# Patient Record
Sex: FEMALE | Race: WHITE | NOT HISPANIC OR LATINO
[De-identification: names, ages, dates, MRNs, and addresses within clinical notes are randomized per-mention and may not be internally consistent; named-entity substitution may affect disease eponyms.]

---

## 2017-06-30 PROBLEM — Z00.00 ENCOUNTER FOR PREVENTIVE HEALTH EXAMINATION: Status: ACTIVE | Noted: 2017-06-30

## 2017-07-25 ENCOUNTER — APPOINTMENT (OUTPATIENT)
Dept: VASCULAR SURGERY | Facility: CLINIC | Age: 72
End: 2017-07-25

## 2017-08-25 ENCOUNTER — APPOINTMENT (OUTPATIENT)
Dept: VASCULAR SURGERY | Facility: CLINIC | Age: 72
End: 2017-08-25

## 2017-10-25 ENCOUNTER — APPOINTMENT (OUTPATIENT)
Dept: VASCULAR SURGERY | Facility: CLINIC | Age: 72
End: 2017-10-25
Payer: MEDICARE

## 2017-10-25 VITALS
TEMPERATURE: 98.2 F | HEART RATE: 82 BPM | OXYGEN SATURATION: 97 % | DIASTOLIC BLOOD PRESSURE: 71 MMHG | BODY MASS INDEX: 31.58 KG/M2 | RESPIRATION RATE: 16 BRPM | SYSTOLIC BLOOD PRESSURE: 131 MMHG | WEIGHT: 185 LBS | HEIGHT: 64 IN

## 2017-10-25 DIAGNOSIS — I80.01 PHLEBITIS AND THROMBOPHLEBITIS OF SUPERFICIAL VESSELS OF RIGHT LOWER EXTREMITY: ICD-10-CM

## 2017-10-25 DIAGNOSIS — Z78.9 OTHER SPECIFIED HEALTH STATUS: ICD-10-CM

## 2017-10-25 DIAGNOSIS — Z86.69 PERSONAL HISTORY OF OTHER DISEASES OF THE NERVOUS SYSTEM AND SENSE ORGANS: ICD-10-CM

## 2017-10-25 PROCEDURE — 99242 OFF/OP CONSLTJ NEW/EST SF 20: CPT | Mod: 25

## 2017-10-25 PROCEDURE — 99202 OFFICE O/P NEW SF 15 MIN: CPT

## 2017-10-25 PROCEDURE — 93970 EXTREMITY STUDY: CPT

## 2017-10-25 RX ORDER — OMEPRAZOLE 20 MG/1
TABLET, DELAYED RELEASE ORAL
Refills: 0 | Status: ACTIVE | COMMUNITY

## 2017-11-14 ENCOUNTER — APPOINTMENT (OUTPATIENT)
Dept: VASCULAR SURGERY | Facility: CLINIC | Age: 72
End: 2017-11-14

## 2019-06-26 ENCOUNTER — RECORD ABSTRACTING (OUTPATIENT)
Age: 74
End: 2019-06-26

## 2019-06-26 DIAGNOSIS — Z01.419 ENCOUNTER FOR GYNECOLOGICAL EXAMINATION (GENERAL) (ROUTINE) W/OUT ABNORMAL FINDINGS: ICD-10-CM

## 2019-06-27 PROBLEM — Z01.419 PAPANICOLAOU SMEAR, AS PART OF ROUTINE GYNECOLOGICAL EXAMINATION: Status: RESOLVED | Noted: 2019-06-27 | Resolved: 2019-06-27

## 2019-06-27 LAB — CYTOLOGY CVX/VAG DOC THIN PREP: NEGATIVE

## 2019-07-12 ENCOUNTER — APPOINTMENT (OUTPATIENT)
Dept: OBGYN | Facility: CLINIC | Age: 74
End: 2019-07-12
Payer: MEDICARE

## 2019-07-12 VITALS
WEIGHT: 166.25 LBS | SYSTOLIC BLOOD PRESSURE: 138 MMHG | BODY MASS INDEX: 28.38 KG/M2 | HEIGHT: 64 IN | DIASTOLIC BLOOD PRESSURE: 70 MMHG

## 2019-07-12 DIAGNOSIS — E78.00 PURE HYPERCHOLESTEROLEMIA, UNSPECIFIED: ICD-10-CM

## 2019-07-12 DIAGNOSIS — M19.90 UNSPECIFIED OSTEOARTHRITIS, UNSPECIFIED SITE: ICD-10-CM

## 2019-07-12 DIAGNOSIS — Z12.39 ENCOUNTER FOR OTHER SCREENING FOR MALIGNANT NEOPLASM OF BREAST: ICD-10-CM

## 2019-07-12 DIAGNOSIS — Z78.0 ASYMPTOMATIC MENOPAUSAL STATE: ICD-10-CM

## 2019-07-12 DIAGNOSIS — Z01.419 ENCOUNTER FOR GYNECOLOGICAL EXAMINATION (GENERAL) (ROUTINE) W/OUT ABNORMAL FINDINGS: ICD-10-CM

## 2019-07-12 PROCEDURE — G0101: CPT

## 2019-07-12 RX ORDER — HYDROXYCHLOROQUINE SULFATE 200 MG/1
200 TABLET, FILM COATED ORAL
Refills: 0 | Status: ACTIVE | COMMUNITY

## 2019-07-12 NOTE — PHYSICAL EXAM
[Awake] : awake [Alert] : alert [Acute Distress] : no acute distress [Mass] : no breast mass [Nipple Discharge] : no nipple discharge [Axillary LAD] : no axillary lymphadenopathy [Tender] : non tender [Soft] : soft [Oriented x3] : oriented to person, place, and time [Normal] : uterus [Uterine Adnexae] : were not tender and not enlarged [No Bleeding] : there was no active vaginal bleeding [Nl Sphincter Tone] : normal sphincter tone [No Tenderness] : no rectal tenderness

## 2019-12-10 ENCOUNTER — APPOINTMENT (OUTPATIENT)
Dept: PULMONOLOGY | Facility: CLINIC | Age: 74
End: 2019-12-10
Payer: MEDICARE

## 2019-12-10 VITALS
BODY MASS INDEX: 29.41 KG/M2 | HEART RATE: 93 BPM | OXYGEN SATURATION: 97 % | HEIGHT: 62.8 IN | SYSTOLIC BLOOD PRESSURE: 118 MMHG | WEIGHT: 166 LBS | DIASTOLIC BLOOD PRESSURE: 78 MMHG

## 2019-12-10 DIAGNOSIS — Z82.49 FAMILY HISTORY OF ISCHEMIC HEART DISEASE AND OTHER DISEASES OF THE CIRCULATORY SYSTEM: ICD-10-CM

## 2019-12-10 DIAGNOSIS — Z83.3 FAMILY HISTORY OF DIABETES MELLITUS: ICD-10-CM

## 2019-12-10 DIAGNOSIS — I10 ESSENTIAL (PRIMARY) HYPERTENSION: ICD-10-CM

## 2019-12-10 PROCEDURE — 94010 BREATHING CAPACITY TEST: CPT

## 2019-12-10 PROCEDURE — 94727 GAS DIL/WSHOT DETER LNG VOL: CPT

## 2019-12-10 PROCEDURE — 94729 DIFFUSING CAPACITY: CPT

## 2019-12-10 PROCEDURE — 85018 HEMOGLOBIN: CPT | Mod: QW

## 2019-12-10 PROCEDURE — 99204 OFFICE O/P NEW MOD 45 MIN: CPT | Mod: 25

## 2019-12-10 RX ORDER — ASPIRIN 81 MG
81 TABLET, DELAYED RELEASE (ENTERIC COATED) ORAL
Refills: 0 | Status: ACTIVE | COMMUNITY

## 2019-12-10 NOTE — PHYSICAL EXAM
[Normal Appearance] : normal appearance [General Appearance - Well Developed] : well developed [Well Groomed] : well groomed [General Appearance - In No Acute Distress] : no acute distress [General Appearance - Well Nourished] : well nourished [No Deformities] : no deformities [Eyelids - No Xanthelasma] : the eyelids demonstrated no xanthelasmas [Normal Conjunctiva] : the conjunctiva exhibited no abnormalities [Neck Appearance] : the appearance of the neck was normal [Normal Oropharynx] : normal oropharynx [Jugular Venous Distention Increased] : there was no jugular-venous distention [Thyroid Nodule] : there were no palpable thyroid nodules [Neck Cervical Mass (___cm)] : no neck mass was observed [Thyroid Diffuse Enlargement] : the thyroid was not enlarged [Heart Rate And Rhythm] : heart rate and rhythm were normal [Murmurs] : no murmurs present [Heart Sounds] : normal S1 and S2 [Respiration, Rhythm And Depth] : normal respiratory rhythm and effort [Abdomen Soft] : soft [Auscultation Breath Sounds / Voice Sounds] : lungs were clear to auscultation bilaterally [Abdomen Tenderness] : non-tender [Exaggerated Use Of Accessory Muscles For Inspiration] : no accessory muscle use [Abnormal Walk] : normal gait [Abdomen Mass (___ Cm)] : no abdominal mass palpated [Cyanosis, Localized] : no localized cyanosis [Nail Clubbing] : no clubbing of the fingernails [Gait - Sufficient For Exercise Testing] : the gait was sufficient for exercise testing [] : no ischemic changes [Petechial Hemorrhages (___cm)] : no petechial hemorrhages [Skin Turgor] : normal skin turgor [No Focal Deficits] : no focal deficits [Impaired Insight] : insight and judgment were intact [Oriented To Time, Place, And Person] : oriented to person, place, and time [Affect] : the affect was normal

## 2019-12-10 NOTE — CONSULT LETTER
[Dear  ___] : Dear  [unfilled], [Consult Letter:] : I had the pleasure of evaluating your patient, [unfilled]. [Please see my note below.] : Please see my note below. [Consult Closing:] : Thank you very much for allowing me to participate in the care of this patient.  If you have any questions, please do not hesitate to contact me. [Sincerely,] : Sincerely, [FreeTextEntry3] : Kristian Lu MD\par

## 2019-12-16 ENCOUNTER — APPOINTMENT (OUTPATIENT)
Dept: THORACIC SURGERY | Facility: CLINIC | Age: 74
End: 2019-12-16
Payer: MEDICARE

## 2019-12-16 VITALS
SYSTOLIC BLOOD PRESSURE: 147 MMHG | HEIGHT: 62 IN | WEIGHT: 165 LBS | DIASTOLIC BLOOD PRESSURE: 78 MMHG | OXYGEN SATURATION: 98 % | BODY MASS INDEX: 30.36 KG/M2 | HEART RATE: 90 BPM | RESPIRATION RATE: 15 BRPM

## 2019-12-16 PROCEDURE — 99203 OFFICE O/P NEW LOW 30 MIN: CPT

## 2019-12-17 NOTE — PHYSICAL EXAM
[General Appearance - Alert] : alert [General Appearance - In No Acute Distress] : in no acute distress [] : no respiratory distress [Auscultation Breath Sounds / Voice Sounds] : lungs were clear to auscultation bilaterally [Heart Rate And Rhythm] : heart rate was normal and rhythm regular [Heart Sounds] : normal S1 and S2 [Murmurs] : no murmurs [Heart Sounds Gallop] : no gallops [Heart Sounds Pericardial Friction Rub] : no pericardial rub [Examination Of The Chest] : the chest was normal in appearance [Diminished Respiratory Excursion] : normal chest expansion [Chest Visual Inspection Thoracic Asymmetry] : no chest asymmetry [No Focal Deficits] : no focal deficits [Oriented To Time, Place, And Person] : oriented to person, place, and time [Impaired Insight] : insight and judgment were intact [Affect] : the affect was normal

## 2019-12-17 NOTE — HISTORY OF PRESENT ILLNESS
[FreeTextEntry1] : Nica was in the office today for an initial visit. She was recently found to have a left lower lobe superior segment groundglass opacity with a part solid component. This is indeterminate. She does feel well and has no specific complaints. She denies fever, chills, night sweats, weight loss or weight gain.\par

## 2019-12-17 NOTE — ASSESSMENT
[FreeTextEntry1] : Gracie is a 74-year-old female with a groundglass opacity in the left lower lobe. Etiology is unclear. She was given the option to do a thoracoscopy and removal versus continued observation and she chose a short term followup. I have recommended a three-month CT scan and would like to see her shortly after that is complete.\par \[facility] Thank you for allowing me to participate in the care of your patient.\par \par Eliezer Garcia MD\[facility] Department of Cardiovascular and Thoracic Surgery\par \roberto Santos and Maliha Gonzalez\[facility] [employer] [facility]

## 2019-12-19 ENCOUNTER — TRANSCRIPTION ENCOUNTER (OUTPATIENT)
Age: 74
End: 2019-12-19

## 2020-03-11 ENCOUNTER — APPOINTMENT (OUTPATIENT)
Dept: PULMONOLOGY | Facility: CLINIC | Age: 75
End: 2020-03-11
Payer: MEDICARE

## 2020-03-11 VITALS
WEIGHT: 170 LBS | BODY MASS INDEX: 31.28 KG/M2 | HEART RATE: 81 BPM | HEIGHT: 62 IN | OXYGEN SATURATION: 98 % | DIASTOLIC BLOOD PRESSURE: 80 MMHG | RESPIRATION RATE: 16 BRPM | SYSTOLIC BLOOD PRESSURE: 138 MMHG

## 2020-03-11 PROCEDURE — 99214 OFFICE O/P EST MOD 30 MIN: CPT

## 2020-09-09 ENCOUNTER — APPOINTMENT (OUTPATIENT)
Dept: PULMONOLOGY | Facility: CLINIC | Age: 75
End: 2020-09-09
Payer: MEDICARE

## 2020-09-09 VITALS
SYSTOLIC BLOOD PRESSURE: 138 MMHG | DIASTOLIC BLOOD PRESSURE: 78 MMHG | WEIGHT: 172 LBS | HEART RATE: 69 BPM | BODY MASS INDEX: 31.65 KG/M2 | HEIGHT: 62 IN | OXYGEN SATURATION: 98 %

## 2020-09-09 DIAGNOSIS — M06.9 RHEUMATOID ARTHRITIS, UNSPECIFIED: ICD-10-CM

## 2020-09-09 DIAGNOSIS — R93.89 ABNORMAL FINDINGS ON DIAGNOSTIC IMAGING OF OTHER SPECIFIED BODY STRUCTURES: ICD-10-CM

## 2020-09-09 PROCEDURE — 99213 OFFICE O/P EST LOW 20 MIN: CPT

## 2020-09-09 RX ORDER — ESCITALOPRAM OXALATE 5 MG/1
TABLET, FILM COATED ORAL
Refills: 0 | Status: DISCONTINUED | COMMUNITY
End: 2020-09-09

## 2020-09-09 RX ORDER — PREDNISONE 10 MG
TABLET ORAL
Refills: 0 | Status: DISCONTINUED | COMMUNITY
End: 2020-09-09

## 2020-09-09 RX ORDER — ATORVASTATIN CALCIUM 80 MG/1
TABLET, FILM COATED ORAL
Refills: 0 | Status: DISCONTINUED | COMMUNITY
End: 2020-09-09

## 2020-09-09 NOTE — PHYSICAL EXAM
[No Acute Distress] : no acute distress [Normal Oropharynx] : normal oropharynx [Normal Appearance] : normal appearance [No Neck Mass] : no neck mass [Normal Rate/Rhythm] : normal rate/rhythm [Normal S1, S2] : normal s1, s2 [No Murmurs] : no murmurs [No Resp Distress] : no resp distress [Clear to Auscultation Bilaterally] : clear to auscultation bilaterally [No Abnormalities] : no abnormalities [Benign] : benign [Normal Gait] : normal gait [No Clubbing] : no clubbing [No Cyanosis] : no cyanosis [FROM] : FROM [No Edema] : no edema [Normal Color/ Pigmentation] : normal color/ pigmentation [Oriented x3] : oriented x3 [No Focal Deficits] : no focal deficits [Normal Affect] : normal affect

## 2020-12-21 PROBLEM — Z01.419 ENCOUNTER FOR GYNECOLOGICAL EXAMINATION: Status: RESOLVED | Noted: 2019-07-12 | Resolved: 2020-12-21

## 2021-03-16 ENCOUNTER — APPOINTMENT (OUTPATIENT)
Dept: PULMONOLOGY | Facility: CLINIC | Age: 76
End: 2021-03-16
Payer: MEDICARE

## 2021-03-16 VITALS
HEIGHT: 62 IN | HEART RATE: 61 BPM | DIASTOLIC BLOOD PRESSURE: 74 MMHG | TEMPERATURE: 98 F | SYSTOLIC BLOOD PRESSURE: 124 MMHG | OXYGEN SATURATION: 98 % | BODY MASS INDEX: 33.13 KG/M2 | WEIGHT: 180 LBS | RESPIRATION RATE: 14 BRPM

## 2021-03-16 PROCEDURE — 99214 OFFICE O/P EST MOD 30 MIN: CPT

## 2021-03-16 RX ORDER — ATORVASTATIN CALCIUM 10 MG/1
10 TABLET, FILM COATED ORAL
Refills: 0 | Status: ACTIVE | COMMUNITY

## 2021-04-05 ENCOUNTER — APPOINTMENT (OUTPATIENT)
Dept: THORACIC SURGERY | Facility: CLINIC | Age: 76
End: 2021-04-05
Payer: MEDICARE

## 2021-04-05 VITALS
HEART RATE: 76 BPM | BODY MASS INDEX: 29.19 KG/M2 | HEIGHT: 64 IN | OXYGEN SATURATION: 98 % | SYSTOLIC BLOOD PRESSURE: 142 MMHG | DIASTOLIC BLOOD PRESSURE: 83 MMHG | WEIGHT: 171 LBS | RESPIRATION RATE: 18 BRPM

## 2021-04-05 PROCEDURE — 99214 OFFICE O/P EST MOD 30 MIN: CPT

## 2021-04-05 NOTE — HISTORY OF PRESENT ILLNESS
[FreeTextEntry1] : iNca was in the office today for an follow up visit. She was recently found to have a left lower lobe superior segment groundglass opacity with a part solid component. This is indeterminate.\par \par She does feel well and has no specific complaints. She denies fever, chills, night sweats, weight loss or weight gain.She is here to discuss CT scan surveillance and PET Scan results. \par

## 2021-04-05 NOTE — DATA REVIEWED
[FreeTextEntry1] : 3.8.21 Non- Contrast CT Chest Scan at [facility] \par -Increasing opacity and size of the solid component of the 1.8 cm lesion in the superior segment  of the left upper lobe, rule out neoplasm.\par \par 3.25.21 PET SCAN Skull base to Mid-Thigh at [facility] \par -There is mild FDG activity corresponding with superior segment left lower lobe pulmonary nodule slightly more prominent than on prior study. Low grade malignancy is suspected\par \par

## 2021-04-05 NOTE — PHYSICAL EXAM
[Sclera] : the sclera and conjunctiva were normal [Neck Appearance] : the appearance of the neck was normal [Exaggerated Use Of Accessory Muscles For Inspiration] : no accessory muscle use [Heart Rate And Rhythm] : heart rate was normal and rhythm regular [Abnormal Walk] : normal gait [] : no rash [Sensation] : the sensory exam was normal to light touch and pinprick [Motor Exam] : the motor exam was normal [Oriented To Time, Place, And Person] : oriented to person, place, and time

## 2021-04-05 NOTE — ASSESSMENT
[FreeTextEntry1] : \par Gracie is a 75-year-old female with a small nodule in the left lower lobe that has had an increase in the solid component. This is suspicious for malignancy. I do believe thoracoscopy and removal was appropriate and she is in agreement. She will be scheduled shortly. I have asked her to see your nose, and throat prior to the procedure for vocal cord evaluation.\par \par Thank you for allowing me to participate in the care of your patient.\par \par 30 minutes was spent during this encounter.\par \par Eliezer Garcia MD\par Department of Cardiovascular and Thoracic Surgery\par \par [employer]\par [employer] [facility] [employer] [facility]\par

## 2021-04-05 NOTE — CONSULT LETTER
[Dear  ___] : Dear  [unfilled], [Consult Letter:] : I had the pleasure of evaluating your patient, [unfilled]. [Please see my note below.] : Please see my note below. [Consult Closing:] : Thank you very much for allowing me to participate in the care of this patient.  If you have any questions, please do not hesitate to contact me. [Sincerely,] : Sincerely, [DrMayelin  ___] : Dr. NORRIS [DrMayelin ___] : Dr. NORRIS [FreeTextEntry2] : Dr. Bravo [FreeTextEntry3] : Eliezer Garcia MD\par Department of Cardiovascular and Thoracic Surgery\par \par [employer]\par [employer] [facility]

## 2021-04-13 ENCOUNTER — NON-APPOINTMENT (OUTPATIENT)
Age: 76
End: 2021-04-13

## 2021-04-14 ENCOUNTER — APPOINTMENT (OUTPATIENT)
Dept: ORTHOPEDIC SURGERY | Facility: CLINIC | Age: 76
End: 2021-04-14
Payer: MEDICARE

## 2021-04-14 VITALS
HEIGHT: 64 IN | WEIGHT: 171 LBS | DIASTOLIC BLOOD PRESSURE: 74 MMHG | SYSTOLIC BLOOD PRESSURE: 136 MMHG | BODY MASS INDEX: 29.19 KG/M2

## 2021-04-14 PROCEDURE — 73562 X-RAY EXAM OF KNEE 3: CPT | Mod: RT

## 2021-04-14 PROCEDURE — 20610 DRAIN/INJ JOINT/BURSA W/O US: CPT | Mod: 50

## 2021-04-14 PROCEDURE — 99204 OFFICE O/P NEW MOD 45 MIN: CPT

## 2021-04-14 NOTE — END OF VISIT
[FreeTextEntry3] : I, Kvng Conte, acted solely as a scribe for Dr. Yury Steven on this date 04/14/2021.

## 2021-04-14 NOTE — PHYSICAL EXAM
[LE] : Sensory: Intact in bilateral lower extremities [ALL] : dorsalis pedis, posterior tibial, femoral, popliteal, and radial 2+ and symmetric bilaterally [Normal] : Alert and in no acute distress [Poor Appearance] : well-appearing [de-identified] : GENERAL APPEARANCE: Well nourished and hydrated, pleasant, alert, and oriented x 3. Appears their stated age. \par HEENT: Normocephalic, extraocular eye motion intact. Nasal septum midline. Oral cavity clear. External auditory canal clear. \par RESPIRATORY: Breath sounds clear and audible in all lobes. No wheezing, No accessory muscle use.\par CARDIOVASCULAR: No apparent abnormalities. No lower leg edema. No varicosities. Pedal pulses are palpable.\par NEUROLOGIC: Sensation is normal, no muscle weakness in the upper or lower extremities.\par DERMATOLOGIC: No apparent skin lesions, moist, warm, no rash.\par SPINE: Cervical spine appears normal and moves freely; thoracic spine appears normal and moves freely; lumbosacral spine appears normal and moves freely, normal, nontender.\par MUSCULOSKELETAL: Hands, wrists, and elbows are normal and move freely, shoulders are normal and move freely.  [de-identified] : Right knee exam shows medial joint line tenderness, no effusion, ROM 10-90 degrees. ROM was measured with a goniometer.  [de-identified] : 3V xray of the right knee done in the office today and reviewed by Dr. Yury Steven demonstrates bone on bone medial compartmental osteoarthritis

## 2021-04-14 NOTE — PROCEDURE
[de-identified] : I injected the patient's b/l knee today with cortisone.\par \par I discussed at length with the patient the planned steroid and lidocaine injection. The risks, benefits, convalescence and alternatives were reviewed. The possible side effects discussed included but were not limited to: pain, swelling, heat, bleeding, and redness. Symptoms are generally mild but if they are extensive then contact the office. Giving pain relievers by mouth such as NSAIDs or Tylenol can generally treat the reactions to steroid and lidocaine. Rare cases of infection have been noted. Rash, hives and itching may occur post injection. If you have muscle pain or cramps, flushing and or swelling of the face, rapid heart beat, nausea, dizziness, fever, chills, headache, difficulty breathing, swelling in the arms or legs, or have a prickly feeling of your skin, contact a health care provider immediately. Following this discussion, the knee was prepped with Alcohol and under sterile condition the 80 mg Depo-Medrol and 6 cc Lidocaine injection was performed with a 20 gauge needle through a superolateral injection site. The needle was introduced into the joint, aspiration was performed to ensure intra-articular placement and the medication was injected. Upon withdrawal of the needle the site was cleaned with alcohol and a band aid applied. The patient tolerated the injection well and there were no adverse effects. Post injection instructions included no strenuous activity for 24 hours, cryotherapy and if there are any adverse effects to contact the office.

## 2021-04-14 NOTE — DISCUSSION/SUMMARY
[de-identified] : 76 y/o F with bone on bone medial compartmental osteoarthritis of the right knee. Conservative therapy and surgical options discussed in detail with the patient. The patient is a candidate for a right TKA. She is experiencing worsening pain and is interested in pursuing a right TKA. A nodule on her lung was recently discovered and requires surgery. As a result, she is interested in having the TKA in the late summer of 2021. In the meantime, pt opted to receive a cortisone injection in the b/l knee today for pain management. She understands that when she receives a cortisone injection, she must wait three months to have the surgery. She scheduled the right TKA today. \par \par \par The patient is a 75 year individual with end stage arthritis of their right knee joint. Based upon the patient's continued symptoms and failure to respond to conservative treatment I have recommended a right total knee arthroplasty for this patient. A long discussion took place with the patient describing what a total joint replacement is and what the procedure would entail. A total knee arthroplasty model, similar to the implant that was used during the operation, was utilized to demonstrate and to discuss the various bearing surfaces of the implants. The hospitalization and post-operative care and rehabilitation were also discussed. The use of perioperative antibiotics and DVT prophylaxis were discussed. The risk, benefits and alternatives to a surgical intervention were discussed at length with the patient. The patient was also advised of risks related to the medical comorbidities, elevated body mass index (BMI), and smoking where applicable. We discussed how to reduce modifiable risk factors and encouraged smoking cessation were applicable.. A lengthy discussion took place to review the most common complications including but not limited to: deep vein thrombosis, pulmonary embolus, heart attack, stroke, infection, wound breakdown, numbness, damage to nerves, tendon, muscles, arteries or other blood vessels, death and other possible complications from anesthesia. The patient was told that we will take steps to minimize these risks by using sterile technique, antibiotics and DVT prophylaxis when appropriate and follow the patient postoperatively in the office setting to monitor progress. The possibility of recurrent pain, no improvement in pain and actual worsening of pain were also discussed with the patient.\par The discharge plan of care focused on the patient going home following surgery. The patient was encouraged to make the necessary arrangements to have someone stay with them when they are discharged home. Following discharge, a home care nurse was to the patient. The home care nurse would open the patient’s home care case and request home physical therapy services. Home physical therapy was to commence following discharge provided it was appropriate and covered by the health insurance benefit plan. \par The benefits of surgery were discussed with the patient including the potential for improving her current clinical condition through operative intervention. Alternatives to surgical intervention including continued conservative management were also discussed in detail. All questions were answered to the satisfaction of the patient. The treatment plan of care, as well as a model of a total knee arthroplasty equivalent to the one that will be used for their total joint replacement, was shared with the patient. The patient agreed to the plan of care as well as the use of implants in their total joint replacement.

## 2021-04-14 NOTE — HISTORY OF PRESENT ILLNESS
[Standing] : standing [Walking] : worsened by walking [Ice] : relieved by ice [Rest] : relieved by rest [Pain Location] : pain [Worsening] : worsening [7] : a current pain level of 7/10 [3] : a minimum pain level of 3/10 [8] : a maximum pain level of 8/10 [de-identified] : 76 y/o F presents with b/l knee pain where the right knee pain is significantly worse than the left knee pain. She received PRP injections from Dr. Stephan Chapa. The patient is currently in PT which started in December 2020. She describes her pain as dully, achy, and cramping. Rest and ice alleviates her pain. Walking and standing exacerbates her pain. Additionally, she has difficulty going up and down stairs due to her pain. A lung nodule was recently discovered on her lung.  [de-identified] : standing, stairs

## 2021-04-14 NOTE — PROCEDURE
[de-identified] : I injected the patient's b/l knee today with cortisone.\par \par I discussed at length with the patient the planned steroid and lidocaine injection. The risks, benefits, convalescence and alternatives were reviewed. The possible side effects discussed included but were not limited to: pain, swelling, heat, bleeding, and redness. Symptoms are generally mild but if they are extensive then contact the office. Giving pain relievers by mouth such as NSAIDs or Tylenol can generally treat the reactions to steroid and lidocaine. Rare cases of infection have been noted. Rash, hives and itching may occur post injection. If you have muscle pain or cramps, flushing and or swelling of the face, rapid heart beat, nausea, dizziness, fever, chills, headache, difficulty breathing, swelling in the arms or legs, or have a prickly feeling of your skin, contact a health care provider immediately. Following this discussion, the knee was prepped with Alcohol and under sterile condition the 80 mg Depo-Medrol and 6 cc Lidocaine injection was performed with a 20 gauge needle through a superolateral injection site. The needle was introduced into the joint, aspiration was performed to ensure intra-articular placement and the medication was injected. Upon withdrawal of the needle the site was cleaned with alcohol and a band aid applied. The patient tolerated the injection well and there were no adverse effects. Post injection instructions included no strenuous activity for 24 hours, cryotherapy and if there are any adverse effects to contact the office.

## 2021-04-14 NOTE — HISTORY OF PRESENT ILLNESS
[Standing] : standing [Walking] : worsened by walking [Ice] : relieved by ice [Rest] : relieved by rest [Pain Location] : pain [Worsening] : worsening [7] : a current pain level of 7/10 [3] : a minimum pain level of 3/10 [8] : a maximum pain level of 8/10 [de-identified] : 76 y/o F presents with b/l knee pain where the right knee pain is significantly worse than the left knee pain. She received PRP injections from Dr. Stephan Chapa. The patient is currently in PT which started in December 2020. She describes her pain as dully, achy, and cramping. Rest and ice alleviates her pain. Walking and standing exacerbates her pain. Additionally, she has difficulty going up and down stairs due to her pain. A lung nodule was recently discovered on her lung.  [de-identified] : standing, stairs

## 2021-04-14 NOTE — PHYSICAL EXAM
[LE] : Sensory: Intact in bilateral lower extremities [ALL] : dorsalis pedis, posterior tibial, femoral, popliteal, and radial 2+ and symmetric bilaterally [Normal] : Alert and in no acute distress [Poor Appearance] : well-appearing [de-identified] : GENERAL APPEARANCE: Well nourished and hydrated, pleasant, alert, and oriented x 3. Appears their stated age. \par HEENT: Normocephalic, extraocular eye motion intact. Nasal septum midline. Oral cavity clear. External auditory canal clear. \par RESPIRATORY: Breath sounds clear and audible in all lobes. No wheezing, No accessory muscle use.\par CARDIOVASCULAR: No apparent abnormalities. No lower leg edema. No varicosities. Pedal pulses are palpable.\par NEUROLOGIC: Sensation is normal, no muscle weakness in the upper or lower extremities.\par DERMATOLOGIC: No apparent skin lesions, moist, warm, no rash.\par SPINE: Cervical spine appears normal and moves freely; thoracic spine appears normal and moves freely; lumbosacral spine appears normal and moves freely, normal, nontender.\par MUSCULOSKELETAL: Hands, wrists, and elbows are normal and move freely, shoulders are normal and move freely.  [de-identified] : Right knee exam shows medial joint line tenderness, no effusion, ROM 10-90 degrees. ROM was measured with a goniometer.  [de-identified] : 3V xray of the right knee done in the office today and reviewed by Dr. Yury Steven demonstrates bone on bone medial compartmental osteoarthritis

## 2021-04-16 ENCOUNTER — OUTPATIENT (OUTPATIENT)
Dept: OUTPATIENT SERVICES | Facility: HOSPITAL | Age: 76
LOS: 1 days | End: 2021-04-16
Payer: MEDICARE

## 2021-04-16 VITALS
DIASTOLIC BLOOD PRESSURE: 80 MMHG | WEIGHT: 176.37 LBS | SYSTOLIC BLOOD PRESSURE: 130 MMHG | TEMPERATURE: 97 F | HEIGHT: 63 IN | HEART RATE: 80 BPM | RESPIRATION RATE: 16 BRPM

## 2021-04-16 DIAGNOSIS — I10 ESSENTIAL (PRIMARY) HYPERTENSION: ICD-10-CM

## 2021-04-16 DIAGNOSIS — Z98.890 OTHER SPECIFIED POSTPROCEDURAL STATES: Chronic | ICD-10-CM

## 2021-04-16 DIAGNOSIS — Z29.9 ENCOUNTER FOR PROPHYLACTIC MEASURES, UNSPECIFIED: ICD-10-CM

## 2021-04-16 DIAGNOSIS — Z01.818 ENCOUNTER FOR OTHER PREPROCEDURAL EXAMINATION: ICD-10-CM

## 2021-04-16 DIAGNOSIS — R91.1 SOLITARY PULMONARY NODULE: ICD-10-CM

## 2021-04-16 LAB
ALBUMIN SERPL ELPH-MCNC: 4.7 G/DL — SIGNIFICANT CHANGE UP (ref 3.3–5.2)
ALP SERPL-CCNC: 84 U/L — SIGNIFICANT CHANGE UP (ref 40–120)
ALT FLD-CCNC: 20 U/L — SIGNIFICANT CHANGE UP
ANION GAP SERPL CALC-SCNC: 10 MMOL/L — SIGNIFICANT CHANGE UP (ref 5–17)
APTT BLD: 25.1 SEC — LOW (ref 27.5–35.5)
AST SERPL-CCNC: 22 U/L — SIGNIFICANT CHANGE UP
BASOPHILS # BLD AUTO: 0.09 K/UL — SIGNIFICANT CHANGE UP (ref 0–0.2)
BASOPHILS NFR BLD AUTO: 0.9 % — SIGNIFICANT CHANGE UP (ref 0–2)
BILIRUB SERPL-MCNC: 0.3 MG/DL — LOW (ref 0.4–2)
BLD GP AB SCN SERPL QL: SIGNIFICANT CHANGE UP
BUN SERPL-MCNC: 27 MG/DL — HIGH (ref 8–20)
CALCIUM SERPL-MCNC: 10.2 MG/DL — SIGNIFICANT CHANGE UP (ref 8.6–10.2)
CHLORIDE SERPL-SCNC: 101 MMOL/L — SIGNIFICANT CHANGE UP (ref 98–107)
CO2 SERPL-SCNC: 30 MMOL/L — HIGH (ref 22–29)
CREAT SERPL-MCNC: 0.61 MG/DL — SIGNIFICANT CHANGE UP (ref 0.5–1.3)
EOSINOPHIL # BLD AUTO: 0 K/UL — SIGNIFICANT CHANGE UP (ref 0–0.5)
EOSINOPHIL NFR BLD AUTO: 0 % — SIGNIFICANT CHANGE UP (ref 0–6)
GIANT PLATELETS BLD QL SMEAR: PRESENT — SIGNIFICANT CHANGE UP
GLUCOSE SERPL-MCNC: 114 MG/DL — HIGH (ref 70–99)
HCT VFR BLD CALC: 40 % — SIGNIFICANT CHANGE UP (ref 34.5–45)
HGB BLD-MCNC: 13.2 G/DL — SIGNIFICANT CHANGE UP (ref 11.5–15.5)
INR BLD: 0.91 RATIO — SIGNIFICANT CHANGE UP (ref 0.88–1.16)
LYMPHOCYTES # BLD AUTO: 0.74 K/UL — LOW (ref 1–3.3)
LYMPHOCYTES # BLD AUTO: 7.1 % — LOW (ref 13–44)
MANUAL SMEAR VERIFICATION: SIGNIFICANT CHANGE UP
MCHC RBC-ENTMCNC: 30.9 PG — SIGNIFICANT CHANGE UP (ref 27–34)
MCHC RBC-ENTMCNC: 33 GM/DL — SIGNIFICANT CHANGE UP (ref 32–36)
MCV RBC AUTO: 93.7 FL — SIGNIFICANT CHANGE UP (ref 80–100)
MONOCYTES # BLD AUTO: 0.38 K/UL — SIGNIFICANT CHANGE UP (ref 0–0.9)
MONOCYTES NFR BLD AUTO: 3.6 % — SIGNIFICANT CHANGE UP (ref 2–14)
NEUTROPHILS # BLD AUTO: 9.26 K/UL — HIGH (ref 1.8–7.4)
NEUTROPHILS NFR BLD AUTO: 88.4 % — HIGH (ref 43–77)
PLAT MORPH BLD: NORMAL — SIGNIFICANT CHANGE UP
PLATELET # BLD AUTO: 195 K/UL — SIGNIFICANT CHANGE UP (ref 150–400)
POTASSIUM SERPL-MCNC: 3.7 MMOL/L — SIGNIFICANT CHANGE UP (ref 3.5–5.3)
POTASSIUM SERPL-SCNC: 3.7 MMOL/L — SIGNIFICANT CHANGE UP (ref 3.5–5.3)
PROT SERPL-MCNC: 7.5 G/DL — SIGNIFICANT CHANGE UP (ref 6.6–8.7)
PROTHROM AB SERPL-ACNC: 10.6 SEC — SIGNIFICANT CHANGE UP (ref 10.6–13.6)
RBC # BLD: 4.27 M/UL — SIGNIFICANT CHANGE UP (ref 3.8–5.2)
RBC # FLD: 12.3 % — SIGNIFICANT CHANGE UP (ref 10.3–14.5)
RBC BLD AUTO: NORMAL — SIGNIFICANT CHANGE UP
SODIUM SERPL-SCNC: 141 MMOL/L — SIGNIFICANT CHANGE UP (ref 135–145)
WBC # BLD: 10.48 K/UL — SIGNIFICANT CHANGE UP (ref 3.8–10.5)
WBC # FLD AUTO: 10.48 K/UL — SIGNIFICANT CHANGE UP (ref 3.8–10.5)

## 2021-04-16 PROCEDURE — 93005 ELECTROCARDIOGRAM TRACING: CPT

## 2021-04-16 PROCEDURE — 93010 ELECTROCARDIOGRAM REPORT: CPT

## 2021-04-16 PROCEDURE — G0463: CPT

## 2021-04-16 RX ORDER — SODIUM CHLORIDE 9 MG/ML
3 INJECTION INTRAMUSCULAR; INTRAVENOUS; SUBCUTANEOUS EVERY 8 HOURS
Refills: 0 | Status: DISCONTINUED | OUTPATIENT
Start: 2021-05-04 | End: 2021-05-06

## 2021-04-16 RX ORDER — CEFAZOLIN SODIUM 1 G
2000 VIAL (EA) INJECTION ONCE
Refills: 0 | Status: COMPLETED | OUTPATIENT
Start: 2021-05-04 | End: 2021-05-04

## 2021-04-16 NOTE — H&P PST ADULT - ASSESSMENT
74 y/o female with MCKENNA no device used,  HTN, HLD, Left lung nodule since 2019, Former tobacco smoker 1.5 PPD  for more than 28 years, seen today pre-op for Flexible Bronchoscopy, left video assisted thoracoscopic surgery, lung resection for solitary pulmonary nodule. Pt report routine CT scan done in 2021 revealed left lower lobe has had an increased. Pt subsequently had  PET SCAN on  3/25/21 at [facility] that revealed  "mild FDG activity corresponding with superior segment left lower lobe pulmonary nodule slightly more prominent than on prior study. Low grade malignancy is suspected". Pt was referred to Dr. Garcia by Dr Pulmonologist Dr. Miles Pt denies cough, denies hemoptysis, denies shortness of breath, denies dyspnea on exertion, denies weight loss, denies change in appetite, denies fever and chills, denies personal hx of malignant neoplasm, report family hx of lung cancer. Pt seen today for a scheduled surgery on 21 with Dr. Garcia. Surgery protocol reviewed with pt today. Pt instructed to stop ASA 5 days prior to surgery as per Dr. Garcia's instructions. Pt to follow-up with PCP and cardiologist for clearances   CAPRINI VTE 2.0 SCORE [CLOT updated 2019]    AGE RELATED RISK FACTORS                                                       MOBILITY RELATED FACTORS  [ ] Age 41-60 years                                            (1 Point)                    [ ] Bed rest                                                        (1 Point)  [ x] Age: 61-74 years                                           (2 Points)                  [ ] Plaster cast                                                   (2 Points)  [ ] Age= 75 years                                              (3 Points)                    [ ] Bed bound for more than 72 hours                 (2 Points)    DISEASE RELATED RISK FACTORS                                               GENDER SPECIFIC FACTORS  [ ] Edema in the lower extremities                       (1 Point)              [ ] Pregnancy                                                     (1 Point)  [x ] Varicose veins                                               (1 Point)                     [ ] Post-partum < 6 weeks                                   (1 Point)             [x ] BMI > 25 Kg/m2                                            (1 Point)                     [ ] Hormonal therapy  or oral contraception          (1 Point)                 [ ] Sepsis (in the previous month)                        (1 Point)               [ ] History of pregnancy complications                 (1 point)  [ ] Pneumonia or serious lung disease                                               [ ] Unexplained or recurrent                     (1 Point)           (in the previous month)                               (1 Point)  [ ] Abnormal pulmonary function test                     (1 Point)                 SURGERY RELATED RISK FACTORS  [ ] Acute myocardial infarction                              (1 Point)               [ ]  Section                                             (1 Point)  [ ] Congestive heart failure (in the previous month)  (1 Point)      [ ] Minor surgery                                                  (1 Point)   [ ] Inflammatory bowel disease                             (1 Point)               [ ] Arthroscopic surgery                                        (2 Points)  [ ] Central venous access                                      (2 Points)                [x ] General surgery lasting more than 45 minutes (2 points)  [ ] Malignancy- Present or previous                   (2 Points)                [ ] Elective arthroplasty                                         (5 points)    [ ] Stroke (in the previous month)                          (5 Points)                                                                                                                                                           HEMATOLOGY RELATED FACTORS                                                 TRAUMA RELATED RISK FACTORS  [ ] Prior episodes of VTE                                     (3 Points)                [ ] Fracture of the hip, pelvis, or leg                       (5 Points)  [ ] Positive family history for VTE                         (3 Points)             [ ] Acute spinal cord injury (in the previous month)  (5 Points)  [ ] Prothrombin 07549 A                                     (3 Points)               [ ] Paralysis  (less than 1 month)                             (5 Points)  [ ] Factor V Leiden                                             (3 Points)                  [ ] Multiple Trauma within 1 month                        (5 Points)  [ ] Lupus anticoagulants                                     (3 Points)                                                           [ ] Anticardiolipin antibodies                               (3 Points)                                                       [ ] High homocysteine in the blood                      (3 Points)                                             [ ] Other congenital or acquired thrombophilia      (3 Points)                                                [ ] Heparin induced thrombocytopenia                  (3 Points)                                     Total Score [    6      ]  OPIOID RISK TOOL    MERVIN EACH BOX THAT APPLIES AND ADD TOTALS AT THE END    FAMILY HISTORY OF SUBSTANCE ABUSE                 FEMALE         MALE                                                Alcohol                             [  ]1 pt          [  ]3pts                                               Illegal Durgs                     [  ]2 pts        [  ]3pts                                               Rx Drugs                           [  ]4 pts        [  ]4 pts    PERSONAL HISTORY OF SUBSTANCE ABUSE                                                                                          Alcohol                             [  ]3 pts       [  ]3 pts                                               Illegal Drugs                     [  ]4 pts        [  ]4 pts                                               Rx Drugs                           [  ]5 pts        [  ]5 pts    AGE BETWEEN 16-45 YEARS                                      [  ]1 pt         [  ]1 pt    HISTORY OF PREADOLESCENT   SEXUAL ABUSE                                                             [  ]3 pts        [  ]0pts    PSYCHOLOGICAL DISEASE                     ADD, OCD, Bipolar, Schizophrenia        [  ]2 pts         [  ]2 pts                      Depression                                               [  ]1 pt           [  ]1 pt           SCORING TOTAL   (add numbers and type here)              (**0*)                                     A score of 3 or lower indicated LOW risk for future opioid abuse  A score of 4 to 7 indicated moderate risk for future opioid abuse  A score of 8 or higher indicates a high risk for opioid abuse

## 2021-04-16 NOTE — H&P PST ADULT - NSICDXFAMILYHX_GEN_ALL_CORE_FT
FAMILY HISTORY:  Mother  Still living? Yes, Estimated age: Age Unknown  Family hx of lung cancer, Age at diagnosis: Age Unknown

## 2021-04-16 NOTE — H&P PST ADULT - HISTORY OF PRESENT ILLNESS
76 y/o female with MCKENNA no device used,  HTN, HLD, Left lung nodule since 2019, Former tobacco smoker 1.5 PPD  for more than 28 years, seen today pre-op for Flexible Bronchoscopy, left video assisted thoracoscopic surgery, lung resection for solitary pulmonary nodule. Pt report routine CT scan done in March 2021 revealed left lower lobe has had an increased. Pt subsequently had  [facility] SC[facility] [place] that revealed  "Mild FDG activity corresponding with superior segment left lower lobe pulmonary nodule slightly more prominent than on prior study. Low grade malignancy is suspected" .Pt was referred to Dr. Garcia by Dr Miles.  Pt denies cough, denies hemoptysis, denies shortness of breath, denies dyspnea on exertion, denies weight loss, denies change in appetite, denies fever and chills, denies personal hx of malignant neoplasm, report family hx of lung cancer. Pt seen today for a scheduled surgery on 5/4/21 with Dr. Garcia

## 2021-04-16 NOTE — H&P PST ADULT - MUSCULOSKELETAL
details… detailed exam decreased ROM/decreased ROM due to pain/joint swelling/joint erythema/joint warmth

## 2021-04-16 NOTE — H&P PST ADULT - NSICDXPROBLEM_GEN_ALL_CORE_FT
PROBLEM DIAGNOSES  Problem: Hypertension  Assessment and Plan: f/u with PCP for routine management     Problem: Solitary pulmonary nodule  Assessment and Plan: Flexible bronchoscopy, left Vido assisted thoracoscopic surgery, lung resection f/u with PCP and cardiologist for clearance    Problem: Need for prophylactic measure  Assessment and Plan: high risk surgical team to determine prophylactic intervention

## 2021-04-16 NOTE — H&P PST ADULT - NSICDXPASTMEDICALHX_GEN_ALL_CORE_FT
PAST MEDICAL HISTORY:  Hyperlipidemia     Hypertension     Solitary pulmonary nodule      PAST MEDICAL HISTORY:  Hyperlipidemia     Hypertension     OA (osteoarthritis) bilateral knee    Solitary pulmonary nodule

## 2021-04-22 PROBLEM — M19.90 UNSPECIFIED OSTEOARTHRITIS, UNSPECIFIED SITE: Chronic | Status: ACTIVE | Noted: 2021-04-16

## 2021-04-22 PROBLEM — E78.5 HYPERLIPIDEMIA, UNSPECIFIED: Chronic | Status: ACTIVE | Noted: 2021-04-16

## 2021-04-22 PROBLEM — I10 ESSENTIAL (PRIMARY) HYPERTENSION: Chronic | Status: ACTIVE | Noted: 2021-04-16

## 2021-04-22 PROBLEM — R91.1 SOLITARY PULMONARY NODULE: Chronic | Status: ACTIVE | Noted: 2021-04-16

## 2021-04-29 ENCOUNTER — NON-APPOINTMENT (OUTPATIENT)
Age: 76
End: 2021-04-29

## 2021-04-30 DIAGNOSIS — Z01.818 ENCOUNTER FOR OTHER PREPROCEDURAL EXAMINATION: ICD-10-CM

## 2021-05-01 ENCOUNTER — APPOINTMENT (OUTPATIENT)
Dept: DISASTER EMERGENCY | Facility: CLINIC | Age: 76
End: 2021-05-01

## 2021-05-02 LAB — SARS-COV-2 N GENE NPH QL NAA+PROBE: NOT DETECTED

## 2021-05-03 ENCOUNTER — TRANSCRIPTION ENCOUNTER (OUTPATIENT)
Age: 76
End: 2021-05-03

## 2021-05-04 ENCOUNTER — RESULT REVIEW (OUTPATIENT)
Age: 76
End: 2021-05-04

## 2021-05-04 ENCOUNTER — APPOINTMENT (OUTPATIENT)
Dept: THORACIC SURGERY | Facility: HOSPITAL | Age: 76
End: 2021-05-04

## 2021-05-04 ENCOUNTER — INPATIENT (INPATIENT)
Facility: HOSPITAL | Age: 76
LOS: 1 days | Discharge: HOME | DRG: 165 | End: 2021-05-06
Attending: THORACIC SURGERY (CARDIOTHORACIC VASCULAR SURGERY) | Admitting: THORACIC SURGERY (CARDIOTHORACIC VASCULAR SURGERY)
Payer: MEDICARE

## 2021-05-04 VITALS
DIASTOLIC BLOOD PRESSURE: 61 MMHG | OXYGEN SATURATION: 98 % | RESPIRATION RATE: 13 BRPM | HEART RATE: 93 BPM | TEMPERATURE: 97 F | WEIGHT: 173.94 LBS | SYSTOLIC BLOOD PRESSURE: 132 MMHG | HEIGHT: 63 IN

## 2021-05-04 DIAGNOSIS — R91.1 SOLITARY PULMONARY NODULE: ICD-10-CM

## 2021-05-04 DIAGNOSIS — Z98.890 OTHER SPECIFIED POSTPROCEDURAL STATES: Chronic | ICD-10-CM

## 2021-05-04 LAB — ABO RH CONFIRMATION: SIGNIFICANT CHANGE UP

## 2021-05-04 PROCEDURE — 32663 THORACOSCOPY W/LOBECTOMY: CPT

## 2021-05-04 PROCEDURE — 88313 SPECIAL STAINS GROUP 2: CPT | Mod: 26

## 2021-05-04 PROCEDURE — 88342 IMHCHEM/IMCYTCHM 1ST ANTB: CPT | Mod: 26,59

## 2021-05-04 PROCEDURE — 88331 PATH CONSLTJ SURG 1 BLK 1SPC: CPT | Mod: 26

## 2021-05-04 PROCEDURE — 88305 TISSUE EXAM BY PATHOLOGIST: CPT | Mod: 26

## 2021-05-04 PROCEDURE — 32674 THORACOSCOPY LYMPH NODE EXC: CPT

## 2021-05-04 PROCEDURE — 71045 X-RAY EXAM CHEST 1 VIEW: CPT | Mod: 26

## 2021-05-04 PROCEDURE — 88307 TISSUE EXAM BY PATHOLOGIST: CPT | Mod: 26

## 2021-05-04 PROCEDURE — 32651 THORACOSCOPY REMOVE CORTEX: CPT

## 2021-05-04 PROCEDURE — 88309 TISSUE EXAM BY PATHOLOGIST: CPT | Mod: 26

## 2021-05-04 PROCEDURE — 88344 IMHCHEM/IMCYTCHM EA MLT ANTB: CPT | Mod: 26

## 2021-05-04 PROCEDURE — 88341 IMHCHEM/IMCYTCHM EA ADD ANTB: CPT | Mod: 26,59

## 2021-05-04 RX ORDER — HYDROXYCHLOROQUINE SULFATE 200 MG
200 TABLET ORAL DAILY
Refills: 0 | Status: DISCONTINUED | OUTPATIENT
Start: 2021-05-04 | End: 2021-05-06

## 2021-05-04 RX ORDER — SODIUM CHLORIDE 9 MG/ML
1000 INJECTION, SOLUTION INTRAVENOUS
Refills: 0 | Status: DISCONTINUED | OUTPATIENT
Start: 2021-05-04 | End: 2021-05-04

## 2021-05-04 RX ORDER — OXYCODONE HYDROCHLORIDE 5 MG/1
5 TABLET ORAL EVERY 6 HOURS
Refills: 0 | Status: DISCONTINUED | OUTPATIENT
Start: 2021-05-04 | End: 2021-05-05

## 2021-05-04 RX ORDER — HYDROCHLOROTHIAZIDE 25 MG
12.5 TABLET ORAL DAILY
Refills: 0 | Status: DISCONTINUED | OUTPATIENT
Start: 2021-05-05 | End: 2021-05-06

## 2021-05-04 RX ORDER — ONDANSETRON 8 MG/1
4 TABLET, FILM COATED ORAL EVERY 6 HOURS
Refills: 0 | Status: DISCONTINUED | OUTPATIENT
Start: 2021-05-04 | End: 2021-05-06

## 2021-05-04 RX ORDER — OXYCODONE HYDROCHLORIDE 5 MG/1
10 TABLET ORAL EVERY 6 HOURS
Refills: 0 | Status: DISCONTINUED | OUTPATIENT
Start: 2021-05-04 | End: 2021-05-05

## 2021-05-04 RX ORDER — ACETAMINOPHEN 500 MG
650 TABLET ORAL EVERY 6 HOURS
Refills: 0 | Status: DISCONTINUED | OUTPATIENT
Start: 2021-05-04 | End: 2021-05-06

## 2021-05-04 RX ORDER — SENNA PLUS 8.6 MG/1
2 TABLET ORAL AT BEDTIME
Refills: 0 | Status: DISCONTINUED | OUTPATIENT
Start: 2021-05-04 | End: 2021-05-06

## 2021-05-04 RX ORDER — PSYLLIUM SEED (WITH DEXTROSE)
1 POWDER (GRAM) ORAL DAILY
Refills: 0 | Status: DISCONTINUED | OUTPATIENT
Start: 2021-05-04 | End: 2021-05-06

## 2021-05-04 RX ORDER — ASPIRIN/CALCIUM CARB/MAGNESIUM 324 MG
81 TABLET ORAL DAILY
Refills: 0 | Status: DISCONTINUED | OUTPATIENT
Start: 2021-05-05 | End: 2021-05-06

## 2021-05-04 RX ORDER — PANTOPRAZOLE SODIUM 20 MG/1
40 TABLET, DELAYED RELEASE ORAL
Refills: 0 | Status: DISCONTINUED | OUTPATIENT
Start: 2021-05-04 | End: 2021-05-06

## 2021-05-04 RX ORDER — ALBUTEROL 90 UG/1
2.5 AEROSOL, METERED ORAL EVERY 6 HOURS
Refills: 0 | Status: DISCONTINUED | OUTPATIENT
Start: 2021-05-04 | End: 2021-05-06

## 2021-05-04 RX ORDER — LOSARTAN POTASSIUM 100 MG/1
100 TABLET, FILM COATED ORAL DAILY
Refills: 0 | Status: DISCONTINUED | OUTPATIENT
Start: 2021-05-05 | End: 2021-05-06

## 2021-05-04 RX ORDER — FENTANYL CITRATE 50 UG/ML
30 INJECTION INTRAVENOUS
Refills: 0 | Status: DISCONTINUED | OUTPATIENT
Start: 2021-05-04 | End: 2021-05-05

## 2021-05-04 RX ORDER — AMLODIPINE BESYLATE 2.5 MG/1
10 TABLET ORAL DAILY
Refills: 0 | Status: DISCONTINUED | OUTPATIENT
Start: 2021-05-04 | End: 2021-05-06

## 2021-05-04 RX ORDER — ATORVASTATIN CALCIUM 80 MG/1
80 TABLET, FILM COATED ORAL AT BEDTIME
Refills: 0 | Status: DISCONTINUED | OUTPATIENT
Start: 2021-05-04 | End: 2021-05-06

## 2021-05-04 RX ORDER — ALPRAZOLAM 0.25 MG
0.25 TABLET ORAL ONCE
Refills: 0 | Status: DISCONTINUED | OUTPATIENT
Start: 2021-05-04 | End: 2021-05-04

## 2021-05-04 RX ORDER — NALOXONE HYDROCHLORIDE 4 MG/.1ML
0.1 SPRAY NASAL
Refills: 0 | Status: DISCONTINUED | OUTPATIENT
Start: 2021-05-04 | End: 2021-05-06

## 2021-05-04 RX ORDER — LATANOPROST 0.05 MG/ML
1 SOLUTION/ DROPS OPHTHALMIC; TOPICAL AT BEDTIME
Refills: 0 | Status: DISCONTINUED | OUTPATIENT
Start: 2021-05-04 | End: 2021-05-06

## 2021-05-04 RX ORDER — HYDROMORPHONE HYDROCHLORIDE 2 MG/ML
0.5 INJECTION INTRAMUSCULAR; INTRAVENOUS; SUBCUTANEOUS
Refills: 0 | Status: DISCONTINUED | OUTPATIENT
Start: 2021-05-04 | End: 2021-05-04

## 2021-05-04 RX ORDER — ENOXAPARIN SODIUM 100 MG/ML
40 INJECTION SUBCUTANEOUS DAILY
Refills: 0 | Status: DISCONTINUED | OUTPATIENT
Start: 2021-05-05 | End: 2021-05-06

## 2021-05-04 RX ORDER — IPRATROPIUM BROMIDE 0.2 MG/ML
500 SOLUTION, NON-ORAL INHALATION EVERY 6 HOURS
Refills: 0 | Status: DISCONTINUED | OUTPATIENT
Start: 2021-05-04 | End: 2021-05-06

## 2021-05-04 RX ORDER — ALBUTEROL 90 UG/1
2 AEROSOL, METERED ORAL EVERY 6 HOURS
Refills: 0 | Status: DISCONTINUED | OUTPATIENT
Start: 2021-05-04 | End: 2021-05-06

## 2021-05-04 RX ADMIN — SODIUM CHLORIDE 3 MILLILITER(S): 9 INJECTION INTRAMUSCULAR; INTRAVENOUS; SUBCUTANEOUS at 21:23

## 2021-05-04 RX ADMIN — ALBUTEROL 2 PUFF(S): 90 AEROSOL, METERED ORAL at 21:58

## 2021-05-04 RX ADMIN — SODIUM CHLORIDE 75 MILLILITER(S): 9 INJECTION, SOLUTION INTRAVENOUS at 16:49

## 2021-05-04 RX ADMIN — FENTANYL CITRATE 30 MILLILITER(S): 50 INJECTION INTRAVENOUS at 17:51

## 2021-05-04 RX ADMIN — ATORVASTATIN CALCIUM 80 MILLIGRAM(S): 80 TABLET, FILM COATED ORAL at 21:25

## 2021-05-04 RX ADMIN — LATANOPROST 1 DROP(S): 0.05 SOLUTION/ DROPS OPHTHALMIC; TOPICAL at 21:25

## 2021-05-04 RX ADMIN — FENTANYL CITRATE 30 MILLILITER(S): 50 INJECTION INTRAVENOUS at 19:26

## 2021-05-04 RX ADMIN — Medication 0.25 MILLIGRAM(S): at 18:55

## 2021-05-04 RX ADMIN — Medication 100 MILLIGRAM(S): at 13:50

## 2021-05-04 RX ADMIN — FENTANYL CITRATE 30 MILLILITER(S): 50 INJECTION INTRAVENOUS at 16:46

## 2021-05-04 RX ADMIN — SENNA PLUS 2 TABLET(S): 8.6 TABLET ORAL at 21:25

## 2021-05-04 NOTE — BRIEF OPERATIVE NOTE - NSICDXBRIEFPROCEDURE_GEN_ALL_CORE_FT
PROCEDURES:  Bronchoscopy, flexible 04-May-2021 16:18:00  Jhonatan Hampton  Video assisted thoracoscopy 04-May-2021 16:18:13  Jhonatan Hampton  Thoracoscopic lobectomy of left lung 04-May-2021 16:19:11 Left Lower Lobe Lobectomy Jhonatan Hampton  Decortication, pulmonary 04-May-2021 16:19:39  Jhonatan Hampton

## 2021-05-04 NOTE — BRIEF OPERATIVE NOTE - OPERATION/FINDINGS
Flexible bronchoscopy, VATS left lower lobe lobectomy, and partial decortication performed. Frozen specimen confirmed malignancy. Chest tube left at completion of procedure.

## 2021-05-05 DIAGNOSIS — E78.5 HYPERLIPIDEMIA, UNSPECIFIED: ICD-10-CM

## 2021-05-05 DIAGNOSIS — I10 ESSENTIAL (PRIMARY) HYPERTENSION: ICD-10-CM

## 2021-05-05 LAB
ALBUMIN SERPL ELPH-MCNC: 3.6 G/DL — SIGNIFICANT CHANGE UP (ref 3.3–5.2)
ALP SERPL-CCNC: 72 U/L — SIGNIFICANT CHANGE UP (ref 40–120)
ALT FLD-CCNC: 14 U/L — SIGNIFICANT CHANGE UP
ANION GAP SERPL CALC-SCNC: 10 MMOL/L — SIGNIFICANT CHANGE UP (ref 5–17)
AST SERPL-CCNC: 20 U/L — SIGNIFICANT CHANGE UP
BILIRUB SERPL-MCNC: 0.3 MG/DL — LOW (ref 0.4–2)
BUN SERPL-MCNC: 19 MG/DL — SIGNIFICANT CHANGE UP (ref 8–20)
CALCIUM SERPL-MCNC: 9.2 MG/DL — SIGNIFICANT CHANGE UP (ref 8.6–10.2)
CHLORIDE SERPL-SCNC: 102 MMOL/L — SIGNIFICANT CHANGE UP (ref 98–107)
CO2 SERPL-SCNC: 27 MMOL/L — SIGNIFICANT CHANGE UP (ref 22–29)
COVID-19 SPIKE DOMAIN AB INTERP: POSITIVE
COVID-19 SPIKE DOMAIN ANTIBODY RESULT: 40.2 U/ML — HIGH
CREAT SERPL-MCNC: 0.57 MG/DL — SIGNIFICANT CHANGE UP (ref 0.5–1.3)
GLUCOSE SERPL-MCNC: 131 MG/DL — HIGH (ref 70–99)
HCT VFR BLD CALC: 35.5 % — SIGNIFICANT CHANGE UP (ref 34.5–45)
HGB BLD-MCNC: 12 G/DL — SIGNIFICANT CHANGE UP (ref 11.5–15.5)
MCHC RBC-ENTMCNC: 31.5 PG — SIGNIFICANT CHANGE UP (ref 27–34)
MCHC RBC-ENTMCNC: 33.8 GM/DL — SIGNIFICANT CHANGE UP (ref 32–36)
MCV RBC AUTO: 93.2 FL — SIGNIFICANT CHANGE UP (ref 80–100)
PHOSPHATE SERPL-MCNC: 3.2 MG/DL — SIGNIFICANT CHANGE UP (ref 2.4–4.7)
PLATELET # BLD AUTO: 145 K/UL — LOW (ref 150–400)
POTASSIUM SERPL-MCNC: 4 MMOL/L — SIGNIFICANT CHANGE UP (ref 3.5–5.3)
POTASSIUM SERPL-SCNC: 4 MMOL/L — SIGNIFICANT CHANGE UP (ref 3.5–5.3)
PROT SERPL-MCNC: 6.1 G/DL — LOW (ref 6.6–8.7)
RBC # BLD: 3.81 M/UL — SIGNIFICANT CHANGE UP (ref 3.8–5.2)
RBC # FLD: 11.9 % — SIGNIFICANT CHANGE UP (ref 10.3–14.5)
SARS-COV-2 IGG+IGM SERPL QL IA: 40.2 U/ML — HIGH
SARS-COV-2 IGG+IGM SERPL QL IA: POSITIVE
SODIUM SERPL-SCNC: 139 MMOL/L — SIGNIFICANT CHANGE UP (ref 135–145)
WBC # BLD: 8.02 K/UL — SIGNIFICANT CHANGE UP (ref 3.8–10.5)
WBC # FLD AUTO: 8.02 K/UL — SIGNIFICANT CHANGE UP (ref 3.8–10.5)

## 2021-05-05 PROCEDURE — 71045 X-RAY EXAM CHEST 1 VIEW: CPT | Mod: 26,77

## 2021-05-05 PROCEDURE — 71045 X-RAY EXAM CHEST 1 VIEW: CPT | Mod: 26

## 2021-05-05 PROCEDURE — 99024 POSTOP FOLLOW-UP VISIT: CPT

## 2021-05-05 RX ORDER — SODIUM CHLORIDE 9 MG/ML
1000 INJECTION, SOLUTION INTRAVENOUS
Refills: 0 | Status: DISCONTINUED | OUTPATIENT
Start: 2021-05-05 | End: 2021-05-05

## 2021-05-05 RX ORDER — OXYCODONE HYDROCHLORIDE 5 MG/1
2.5 TABLET ORAL EVERY 4 HOURS
Refills: 0 | Status: DISCONTINUED | OUTPATIENT
Start: 2021-05-05 | End: 2021-05-06

## 2021-05-05 RX ORDER — ACETAMINOPHEN 500 MG
2 TABLET ORAL
Qty: 0 | Refills: 0 | DISCHARGE
Start: 2021-05-05

## 2021-05-05 RX ORDER — SENNA PLUS 8.6 MG/1
2 TABLET ORAL
Qty: 14 | Refills: 0
Start: 2021-05-05 | End: 2021-05-11

## 2021-05-05 RX ORDER — IBUPROFEN 200 MG
600 TABLET ORAL EVERY 6 HOURS
Refills: 0 | Status: DISCONTINUED | OUTPATIENT
Start: 2021-05-05 | End: 2021-05-06

## 2021-05-05 RX ADMIN — Medication 81 MILLIGRAM(S): at 11:40

## 2021-05-05 RX ADMIN — Medication 12.5 MILLIGRAM(S): at 05:00

## 2021-05-05 RX ADMIN — SODIUM CHLORIDE 3 MILLILITER(S): 9 INJECTION INTRAMUSCULAR; INTRAVENOUS; SUBCUTANEOUS at 11:45

## 2021-05-05 RX ADMIN — ALBUTEROL 2 PUFF(S): 90 AEROSOL, METERED ORAL at 15:05

## 2021-05-05 RX ADMIN — SODIUM CHLORIDE 3 MILLILITER(S): 9 INJECTION INTRAMUSCULAR; INTRAVENOUS; SUBCUTANEOUS at 04:49

## 2021-05-05 RX ADMIN — OXYCODONE HYDROCHLORIDE 5 MILLIGRAM(S): 5 TABLET ORAL at 11:40

## 2021-05-05 RX ADMIN — Medication 600 MILLIGRAM(S): at 22:26

## 2021-05-05 RX ADMIN — ALBUTEROL 2 PUFF(S): 90 AEROSOL, METERED ORAL at 03:42

## 2021-05-05 RX ADMIN — OXYCODONE HYDROCHLORIDE 5 MILLIGRAM(S): 5 TABLET ORAL at 12:00

## 2021-05-05 RX ADMIN — SENNA PLUS 2 TABLET(S): 8.6 TABLET ORAL at 22:05

## 2021-05-05 RX ADMIN — ATORVASTATIN CALCIUM 80 MILLIGRAM(S): 80 TABLET, FILM COATED ORAL at 22:05

## 2021-05-05 RX ADMIN — SODIUM CHLORIDE 3 MILLILITER(S): 9 INJECTION INTRAMUSCULAR; INTRAVENOUS; SUBCUTANEOUS at 21:51

## 2021-05-05 RX ADMIN — LOSARTAN POTASSIUM 100 MILLIGRAM(S): 100 TABLET, FILM COATED ORAL at 05:00

## 2021-05-05 RX ADMIN — Medication 200 MILLIGRAM(S): at 11:40

## 2021-05-05 RX ADMIN — PANTOPRAZOLE SODIUM 40 MILLIGRAM(S): 20 TABLET, DELAYED RELEASE ORAL at 05:00

## 2021-05-05 RX ADMIN — ENOXAPARIN SODIUM 40 MILLIGRAM(S): 100 INJECTION SUBCUTANEOUS at 22:05

## 2021-05-05 RX ADMIN — LATANOPROST 1 DROP(S): 0.05 SOLUTION/ DROPS OPHTHALMIC; TOPICAL at 22:05

## 2021-05-05 RX ADMIN — ALBUTEROL 2 PUFF(S): 90 AEROSOL, METERED ORAL at 08:51

## 2021-05-05 RX ADMIN — AMLODIPINE BESYLATE 10 MILLIGRAM(S): 2.5 TABLET ORAL at 05:00

## 2021-05-05 RX ADMIN — Medication 1 PACKET(S): at 11:40

## 2021-05-05 RX ADMIN — Medication 600 MILLIGRAM(S): at 23:45

## 2021-05-05 RX ADMIN — Medication 650 MILLIGRAM(S): at 19:30

## 2021-05-05 RX ADMIN — SODIUM CHLORIDE 30 MILLILITER(S): 9 INJECTION, SOLUTION INTRAVENOUS at 05:00

## 2021-05-05 RX ADMIN — Medication 650 MILLIGRAM(S): at 18:35

## 2021-05-05 RX ADMIN — FENTANYL CITRATE 30 MILLILITER(S): 50 INJECTION INTRAVENOUS at 07:18

## 2021-05-05 NOTE — DISCHARGE NOTE PROVIDER - NSDCFUADDINST_GEN_ALL_CORE_FT
Please call the Cardiothoracic Surgery office at [phone] if you are experiencing any shortness of breath, chest pain, fevers or chills, drainage from the incisions, persistent nausea, vomiting or if you have any questions about your medications. If the symptoms are severe, call 911 and go to the nearest hospital. You can also call [phone] for an emergency [facility] ambulance, which will take you to the closest [facility].    If you need any assistance for making any appointments for a new consult or referral in any specialty, please call our [facility] Clinical Coordination Center at [phone].

## 2021-05-05 NOTE — DISCHARGE NOTE PROVIDER - PROVIDER TOKENS
FREE:[LAST:[Nuckolls],FIRST:[Eliezer],PHONE:[phone]],FAX:[phone]   [phone]    -],ADDRESS:[[address] [place] [address]],SCHEDULEDAPPT:[05/17/2021],SCHEDULEDAPPTTIME:[10:15 AM]]

## 2021-05-05 NOTE — DISCHARGE NOTE PROVIDER - CARE PROVIDER_API CALL
Eliezer Garcia  [address] [place] [address]  Phone: [phone]  Fax: (   )    -  Scheduled Appointment: 05/17/2021 10:15 AM

## 2021-05-05 NOTE — DISCHARGE NOTE PROVIDER - NSDCMRMEDTOKEN_GEN_ALL_CORE_FT
acetaminophen 325 mg oral tablet: 2 tab(s) orally every 6 hours, As needed, Mild Pain (1 - 3)  amLODIPine 10 mg oral tablet: 1 tab(s) orally once a day  aspirin 81 mg oral delayed release tablet: 1 tab(s) orally once a day  candesartan-hydrochlorothiazide 32 mg-12.5 mg oral tablet: 1 tab(s) orally once a day  Crestor 40 mg oral tablet: 1 tab(s) orally once a day  hydroxychloroquine 200 mg oral tablet: 1 tab(s) orally once a day  omeprazole 40 mg oral delayed release capsule: 1 cap(s) orally once a day   acetaminophen 325 mg oral tablet: 2 tab(s) orally every 6 hours, As needed, Mild Pain (1 - 3)  amLODIPine 10 mg oral tablet: 1 tab(s) orally once a day  aspirin 81 mg oral delayed release tablet: 1 tab(s) orally once a day  candesartan-hydrochlorothiazide 32 mg-12.5 mg oral tablet: 1 tab(s) orally once a day  Crestor 40 mg oral tablet: 1 tab(s) orally once a day  hydroxychloroquine 200 mg oral tablet: 1 tab(s) orally once a day  ibuprofen 600 mg oral tablet: 1 tab(s) orally every 6 hours, As needed, Mild Pain (1 - 3), Moderate Pain (4 - 6), Severe Pain (7 - 10)  latanoprost 0.005% ophthalmic solution: 1 drop(s) to each affected eye once a day (at bedtime)  omeprazole 40 mg oral delayed release capsule: 1 cap(s) orally once a day  oxyCODONE 5 mg oral tablet: 0.5 tab(s) orally every 6 hours, As Needed -for severe pain MDD:4   senna oral tablet: 2 tab(s) orally once a day (at bedtime), As Needed -for constipation  , take while taking pain medication

## 2021-05-05 NOTE — DISCHARGE NOTE PROVIDER - NSDCCPTREATMENT_GEN_ALL_CORE_FT
PRINCIPAL PROCEDURE  Procedure: Video assisted thoracoscopy  Findings and Treatment:       SECONDARY PROCEDURE  Procedure: Decortication, pulmonary  Findings and Treatment:     Procedure: Bronchoscopy, flexible  Findings and Treatment:

## 2021-05-05 NOTE — DISCHARGE NOTE PROVIDER - HOSPITAL COURSE
75F, pmhx MCKENNA (no CPAP), HTN, hLD, former smoker 1.5PPD x28 years, with lung nodule, admitted as same day, now s/p left lower lobectomy and partial decortication via L VATS on 5/4 with Dr. Garcia. Pt with uneventful postop course. Home medications resumed. CT d/c'd POD1. Pt remained hemodynamically stable and ambulating well. Pt stable for discharge home as discussed with Dr. Garcia.

## 2021-05-05 NOTE — PHYSICAL THERAPY INITIAL EVALUATION ADULT - LEVEL OF INDEPENDENCE: STAIR NEGOTIATION, REHAB EVAL
pt with c/o of increased chest pressure post stair negotiation, SpO2 at 88-89% resolving with rest and deep breathing exercises./independent

## 2021-05-05 NOTE — PHYSICAL THERAPY INITIAL EVALUATION ADULT - PERTINENT HX OF CURRENT PROBLEM, REHAB EVAL
Pt report routine CT scan done in March 2021 revealed left lower lobe has had an increased. Pt subsequently had  PET SCAN 3/25/21 at [facility] that revealed  "Mild FDG activity corresponding with superior segment left lower lobe pulmonary nodule slightly more prominent than on prior study. Pt is now s/p left lower lobectomy, partial decortication via L VATS

## 2021-05-05 NOTE — PROGRESS NOTE ADULT - SUBJECTIVE AND OBJECTIVE BOX
Subjective: Patient lying in bed, no acute distress noted, denies chest pian, pressure, dizziness, shortness of breath, abdominal pian, N/V/D      V/S  T(C): 36.6 (05-04-21 @ 23:58), Max: 36.6 (05-04-21 @ 21:20)  HR: 81 (05-05-21 @ 03:43) (81 - 95)  BP: 129/62 (05-04-21 @ 23:58) (123/72 - 140/77)  RR: 18 (05-04-21 @ 23:58) (13 - 22)  SpO2: 99% (05-05-21 @ 03:43) (98% - 100%) on 3L O2      CHEST TUBE:   Left chest to waterseal            OUTPUT:             per 24 hours     Drainage:    AIR LEAKS:  [ ] YES [ z] NO      MEDICATIONS  (STANDING):  ALBUTerol    90 MICROgram(s) HFA Inhaler 2 Puff(s) Inhalation every 6 hours  amLODIPine   Tablet 10 milliGRAM(s) Oral daily  aspirin enteric coated 81 milliGRAM(s) Oral daily  atorvastatin 80 milliGRAM(s) Oral at bedtime  enoxaparin Injectable 40 milliGRAM(s) SubCutaneous daily  fentaNYL PCA (50 MICROgram(s)/mL) 30 milliLiter(s) PCA Continuous PCA Continuous  hydrochlorothiazide 12.5 milliGRAM(s) Oral daily  hydroxychloroquine 200 milliGRAM(s) Oral daily  lactated ringers. 1000 milliLiter(s) (30 mL/Hr) IV Continuous <Continuous>  latanoprost 0.005% Ophthalmic Solution 1 Drop(s) Both EYES at bedtime  losartan 100 milliGRAM(s) Oral daily  pantoprazole    Tablet 40 milliGRAM(s) Oral before breakfast  psyllium Powder 1 Packet(s) Oral daily  senna 2 Tablet(s) Oral at bedtime  sodium chloride 0.9% lock flush 3 milliLiter(s) IV Push every 8 hours      Physical Exam:    Neuro:  Alert, oriented x3, non focal    Pulm: Clear breath sounds clear, diminished on left base. Left chest tube to water seal occasional high tidling noted    CV: Regular rate and rhythm    Abd: Soft, non tender,     Extremities: Warm well perfused    Incision: Left chest wall incision open to air, dry and intact.         PAST MEDICAL & SURGICAL HISTORY:  Solitary pulmonary nodule    Hypertension    Hyperlipidemia    OA (osteoarthritis)  bilateral knee    History of colonoscopy

## 2021-05-05 NOTE — DISCHARGE NOTE PROVIDER - NSDCFUADDAPPT_GEN_ALL_CORE_FT
1. Follow up with Dr. Garcia on      1. Follow up with Dr. Garcia on Monday 5/17 @ 10:15 AM. The office is located at [address] [place] [address] [phone]

## 2021-05-05 NOTE — DISCHARGE NOTE PROVIDER - NSDCCPCAREPLAN_GEN_ALL_CORE_FT
PRINCIPAL DISCHARGE DIAGNOSIS  Diagnosis: Solitary pulmonary nodule  Assessment and Plan of Treatment: Dressig can be removed later today and you  may take a shower. there is a stitch remaining in place that will be removed by Dr. Garcia in the office. Place clean gauze over wound if continual drainage. Call Dr. Garcia's office at [phone] tomorrow or the next business day to make a followup appointment. Call the office if you experience any fevers, shortness of breath, chest pain or excessive drainage from the incision, day or night. Go to the emergency room if any of these symptoms are severe. Take your medications as ordered and take a stool softener if needed with the narcotic medications.      SECONDARY DISCHARGE DIAGNOSES  Diagnosis: Hypertension  Assessment and Plan of Treatment: continue your home meidcations

## 2021-05-05 NOTE — PHYSICAL THERAPY INITIAL EVALUATION ADULT - ADDITIONAL COMMENTS
Pt reports living in a private home with her  who is able to assist. Pt has 5 steps to enter with 2 handrails and 5 stairs inside up/down with 2 handrails. Pt independent prior to admission. Owns no DME. Pt drives & is retired.

## 2021-05-05 NOTE — DISCHARGE NOTE PROVIDER - NSDCFUSCHEDAPPT_GEN_ALL_CORE_FT
STEPHEN MACK ; 07/29/2021 ; [facility] [address] [place] STEPHEN MACK ; 05/17/2021 ; [facility] [address] [facility]  STEPHEN MACK ; 07/29/2021 ; NPP Ortho Surgery [address] [place]

## 2021-05-05 NOTE — CHART NOTE - NSCHARTNOTEFT_GEN_A_CORE
POD 1 s/p left lower lobectomy, partial decortication via L VATS, uneventful postop course. CXR, labs, VS reviewed, pt seen and examined. LCT d/c'd, f/u CXR pending. tentative d/c home today vs tomorrow pending pain control. fentanyl pca to be d/c'd and oxycodone to start. lovenox for dvt ppx today. pt c/o pain at incision and chest tube site, reports not hitting PCA button frequently enough    Neuro: A&Ox3, non focal, speech clear and intact  CV: S1S2 RRR, no murmurs  Pulm: crackles at left base, clear on right  Abd: +BS soft NT ND  Ext: no edema, WWP, GEORGES  inc: L VATs inc C/D/I    cont oob, ambulate, Lovenox for dvt ppx  encourage Incentive Spirometry use, off O2 this AM with stable SpO2  diet as tolerated, protonix for gi ppx, senna/metamucil for bowel regimen  dispo: d/c home later today vs tomorrow pending pain control  d/w Dr. Garcia
No

## 2021-05-06 ENCOUNTER — TRANSCRIPTION ENCOUNTER (OUTPATIENT)
Age: 76
End: 2021-05-06

## 2021-05-06 VITALS
DIASTOLIC BLOOD PRESSURE: 70 MMHG | TEMPERATURE: 98 F | SYSTOLIC BLOOD PRESSURE: 130 MMHG | OXYGEN SATURATION: 97 % | HEART RATE: 90 BPM | RESPIRATION RATE: 18 BRPM

## 2021-05-06 LAB
ANION GAP SERPL CALC-SCNC: 10 MMOL/L — SIGNIFICANT CHANGE UP (ref 5–17)
BUN SERPL-MCNC: 17 MG/DL — SIGNIFICANT CHANGE UP (ref 8–20)
CALCIUM SERPL-MCNC: 9.7 MG/DL — SIGNIFICANT CHANGE UP (ref 8.6–10.2)
CHLORIDE SERPL-SCNC: 103 MMOL/L — SIGNIFICANT CHANGE UP (ref 98–107)
CO2 SERPL-SCNC: 28 MMOL/L — SIGNIFICANT CHANGE UP (ref 22–29)
CREAT SERPL-MCNC: 0.64 MG/DL — SIGNIFICANT CHANGE UP (ref 0.5–1.3)
GLUCOSE SERPL-MCNC: 106 MG/DL — HIGH (ref 70–99)
HCT VFR BLD CALC: 34 % — LOW (ref 34.5–45)
HGB BLD-MCNC: 11.4 G/DL — LOW (ref 11.5–15.5)
MAGNESIUM SERPL-MCNC: 2.2 MG/DL — SIGNIFICANT CHANGE UP (ref 1.6–2.6)
MCHC RBC-ENTMCNC: 31.4 PG — SIGNIFICANT CHANGE UP (ref 27–34)
MCHC RBC-ENTMCNC: 33.5 GM/DL — SIGNIFICANT CHANGE UP (ref 32–36)
MCV RBC AUTO: 93.7 FL — SIGNIFICANT CHANGE UP (ref 80–100)
PLATELET # BLD AUTO: 146 K/UL — LOW (ref 150–400)
POTASSIUM SERPL-MCNC: 3.8 MMOL/L — SIGNIFICANT CHANGE UP (ref 3.5–5.3)
POTASSIUM SERPL-SCNC: 3.8 MMOL/L — SIGNIFICANT CHANGE UP (ref 3.5–5.3)
RBC # BLD: 3.63 M/UL — LOW (ref 3.8–5.2)
RBC # FLD: 12.4 % — SIGNIFICANT CHANGE UP (ref 10.3–14.5)
SODIUM SERPL-SCNC: 140 MMOL/L — SIGNIFICANT CHANGE UP (ref 135–145)
WBC # BLD: 9.77 K/UL — SIGNIFICANT CHANGE UP (ref 3.8–10.5)
WBC # FLD AUTO: 9.77 K/UL — SIGNIFICANT CHANGE UP (ref 3.8–10.5)

## 2021-05-06 PROCEDURE — 80053 COMPREHEN METABOLIC PANEL: CPT

## 2021-05-06 PROCEDURE — 88307 TISSUE EXAM BY PATHOLOGIST: CPT

## 2021-05-06 PROCEDURE — 36415 COLL VENOUS BLD VENIPUNCTURE: CPT

## 2021-05-06 PROCEDURE — 83735 ASSAY OF MAGNESIUM: CPT

## 2021-05-06 PROCEDURE — 84100 ASSAY OF PHOSPHORUS: CPT

## 2021-05-06 PROCEDURE — 88309 TISSUE EXAM BY PATHOLOGIST: CPT

## 2021-05-06 PROCEDURE — 88305 TISSUE EXAM BY PATHOLOGIST: CPT

## 2021-05-06 PROCEDURE — 94760 N-INVAS EAR/PLS OXIMETRY 1: CPT

## 2021-05-06 PROCEDURE — 86769 SARS-COV-2 COVID-19 ANTIBODY: CPT

## 2021-05-06 PROCEDURE — 88342 IMHCHEM/IMCYTCHM 1ST ANTB: CPT

## 2021-05-06 PROCEDURE — 97163 PT EVAL HIGH COMPLEX 45 MIN: CPT

## 2021-05-06 PROCEDURE — 80048 BASIC METABOLIC PNL TOTAL CA: CPT

## 2021-05-06 PROCEDURE — 85027 COMPLETE CBC AUTOMATED: CPT

## 2021-05-06 PROCEDURE — C9399: CPT

## 2021-05-06 PROCEDURE — 88313 SPECIAL STAINS GROUP 2: CPT

## 2021-05-06 PROCEDURE — 71045 X-RAY EXAM CHEST 1 VIEW: CPT | Mod: 26

## 2021-05-06 PROCEDURE — C1729: CPT

## 2021-05-06 PROCEDURE — C1889: CPT

## 2021-05-06 PROCEDURE — 88341 IMHCHEM/IMCYTCHM EA ADD ANTB: CPT

## 2021-05-06 PROCEDURE — 88331 PATH CONSLTJ SURG 1 BLK 1SPC: CPT

## 2021-05-06 PROCEDURE — 71045 X-RAY EXAM CHEST 1 VIEW: CPT

## 2021-05-06 PROCEDURE — 94640 AIRWAY INHALATION TREATMENT: CPT

## 2021-05-06 RX ORDER — LATANOPROST 0.05 MG/ML
1 SOLUTION/ DROPS OPHTHALMIC; TOPICAL
Qty: 0 | Refills: 0 | DISCHARGE
Start: 2021-05-06

## 2021-05-06 RX ORDER — OXYCODONE HYDROCHLORIDE 5 MG/1
0.5 TABLET ORAL
Qty: 10 | Refills: 0
Start: 2021-05-06 | End: 2021-05-10

## 2021-05-06 RX ORDER — IBUPROFEN 200 MG
1 TABLET ORAL
Qty: 12 | Refills: 0
Start: 2021-05-06 | End: 2021-05-08

## 2021-05-06 RX ADMIN — Medication 600 MILLIGRAM(S): at 05:22

## 2021-05-06 RX ADMIN — Medication 650 MILLIGRAM(S): at 10:21

## 2021-05-06 RX ADMIN — Medication 650 MILLIGRAM(S): at 01:39

## 2021-05-06 RX ADMIN — PANTOPRAZOLE SODIUM 40 MILLIGRAM(S): 20 TABLET, DELAYED RELEASE ORAL at 05:23

## 2021-05-06 RX ADMIN — Medication 600 MILLIGRAM(S): at 06:09

## 2021-05-06 RX ADMIN — Medication 12.5 MILLIGRAM(S): at 05:22

## 2021-05-06 RX ADMIN — Medication 650 MILLIGRAM(S): at 00:59

## 2021-05-06 RX ADMIN — ALBUTEROL 2 PUFF(S): 90 AEROSOL, METERED ORAL at 10:39

## 2021-05-06 RX ADMIN — LOSARTAN POTASSIUM 100 MILLIGRAM(S): 100 TABLET, FILM COATED ORAL at 05:22

## 2021-05-06 RX ADMIN — SODIUM CHLORIDE 3 MILLILITER(S): 9 INJECTION INTRAMUSCULAR; INTRAVENOUS; SUBCUTANEOUS at 05:24

## 2021-05-06 RX ADMIN — Medication 81 MILLIGRAM(S): at 10:20

## 2021-05-06 RX ADMIN — AMLODIPINE BESYLATE 10 MILLIGRAM(S): 2.5 TABLET ORAL at 05:22

## 2021-05-06 NOTE — DISCHARGE NOTE NURSING/CASE MANAGEMENT/SOCIAL WORK - PATIENT PORTAL LINK FT
You can access the FollowMyHealth Patient Portal offered by [facility] by registering at the following website: [email or web address]. By joining [facility]’s FollowMyHealth portal, you will also be able to view your health information using other applications (apps) compatible with our system.

## 2021-05-06 NOTE — PROGRESS NOTE ADULT - SUBJECTIVE AND OBJECTIVE BOX
POD 2 s/p L VATS, left lower lobectomy, partial decortication    Subjective: c/o pain at incision site when coughing, relieved with motrin and tylenol, denies CP, palpitations, SOB, cough, fever, chills, itchiness/rash, diaphoresis, vision changes, HA, dizziness/lightheadedness, numbness/tingling, abd pain, N/V.     T(C): 36.6 (05-06-21 @ 05:21), Max: 36.8 (05-05-21 @ 15:10)  HR: 85 (05-06-21 @ 05:21) (76 - 92)  BP: 120/65 (05-06-21 @ 05:21) (120/65 - 142/69)  RR: 17 (05-06-21 @ 05:21) (16 - 18)  SpO2: 95% (05-06-21 @ 05:21) (95% - 100%)    05-06    140  |  103  |  17.0  ----------------------------<  106<H>  3.8   |  28.0  |  0.64    Ca    9.7      06 May 2021 06:09  Phos  3.2     05-05  Mg     2.2     05-06    TPro  6.1<L>  /  Alb  3.6  /  TBili  0.3<L>  /  DBili  x   /  AST  20  /  ALT  14  /  AlkPhos  72  05-05                               11.4   9.77  )-----------( 146      ( 06 May 2021 06:09 )             34.0     I&O's Detail    05 May 2021 07:01  -  06 May 2021 07:00  --------------------------------------------------------  IN:    Oral Fluid: 480 mL  Total IN: 480 mL    OUT:    Voided (mL): 300 mL  Total OUT: 300 mL  Total NET: 180 mL    Drug Dosing Weight  Height (cm): 160 (04 May 2021 11:20)  Weight (kg): 78.9 (04 May 2021 11:20)  BMI (kg/m2): 30.8 (04 May 2021 11:20)  BSA (m2): 1.82 (04 May 2021 11:20)    MEDICATIONS  (STANDING):  ALBUTerol    90 MICROgram(s) HFA Inhaler 2 Puff(s) Inhalation every 6 hours  amLODIPine   Tablet 10 milliGRAM(s) Oral daily  aspirin enteric coated 81 milliGRAM(s) Oral daily  atorvastatin 80 milliGRAM(s) Oral at bedtime  enoxaparin Injectable 40 milliGRAM(s) SubCutaneous daily  hydrochlorothiazide 12.5 milliGRAM(s) Oral daily  hydroxychloroquine 200 milliGRAM(s) Oral daily  latanoprost 0.005% Ophthalmic Solution 1 Drop(s) Both EYES at bedtime  losartan 100 milliGRAM(s) Oral daily  pantoprazole    Tablet 40 milliGRAM(s) Oral before breakfast  psyllium Powder 1 Packet(s) Oral daily  senna 2 Tablet(s) Oral at bedtime  sodium chloride 0.9% lock flush 3 milliLiter(s) IV Push every 8 hours    MEDICATIONS  (PRN):  acetaminophen   Tablet .. 650 milliGRAM(s) Oral every 6 hours PRN Mild Pain (1 - 3)  ALBUTerol    0.083% 2.5 milliGRAM(s) Nebulizer every 6 hours PRN Shortness of Breath and/or Wheezing  ibuprofen  Tablet. 600 milliGRAM(s) Oral every 6 hours PRN Mild Pain (1 - 3), Moderate Pain (4 - 6), Severe Pain (7 - 10)  ipratropium    for Nebulization 500 MICROGram(s) Nebulizer every 6 hours PRN Shortness of Breath and/or Wheezing  naloxone Injectable 0.1 milliGRAM(s) IV Push every 3 minutes PRN For ANY of the following changes in patient status:  A. RR LESS THAN 10 breaths per minute, B. Oxygen saturation LESS THAN 90%, C. Sedation score of 6  ondansetron Injectable 4 milliGRAM(s) IV Push every 6 hours PRN Nausea  oxyCODONE    IR 2.5 milliGRAM(s) Oral every 4 hours PRN Severe Pain (7 - 10)    Physical Exam  Gen: NAD  Neuro: A&Ox3, non focal, speech clear and intact  Pulm: decreased breath sounds left base, clear on right  CV: S1S2 RRR,  no murmurs  Abd: +BS soft NT ND  Extrem/MS: no cyanosis/edema, WWP, MAEx4  Incision(s): L VATS inc C/D/I, CT site covered with clean dressing

## 2021-05-06 NOTE — PROGRESS NOTE ADULT - ASSESSMENT
74 y/o female with MCKENNA no device used,  HTN, HLD, Left lung nodule since 2019, Former tobacco smoker 1.5 PPD  for more than 28 years. Now s/p Flexible Bronchoscopy, left video assisted thoracoscopic surgery, lung lower lobe resection for solitary pulmonary nodule. 
75F, pmhx MCKENNA (no CPAP), HTN, hLD, former smoker 1.5PPD x28 years, with lung nodule, admitted as same day, now s/p left lower lobectomy and partial decortication via L VATS on 5/4 with Dr. Garcia.

## 2021-05-06 NOTE — PROGRESS NOTE ADULT - PROBLEM SELECTOR PLAN 1
s/p L VATS, CT d/c'd 5/5  oob, ambulate as tolerated  PRN motrin/tylenol for pain, pt requesting 2.5mg oxy instead of 5  encourage Incentive Spirometry use, spo2 stable on RA  tolerating diet  ambulating well  d/c home today as d/w Dr. Garcia
Now s/p  Flexible Bronchoscopy, left video assisted thoracoscopic surgery, lung lower lobe resection (05/04)  Continue PCA for analgesia  Incentive Spirometry, cough and deep breathing  OOB to chair, ambulate as tolerated  Left chest tube to waterseal  CXR in AM  Plan to discuss with Thoracic team in AM

## 2021-05-06 NOTE — DISCHARGE NOTE NURSING/CASE MANAGEMENT/SOCIAL WORK - NSDCFUADDAPPT_GEN_ALL_CORE_FT
1. Follow up with Dr. Garcia on Monday 5/17 @ 10:15 AM. The office is located at [address] [place] [address] [phone]

## 2021-05-12 ENCOUNTER — RESULT REVIEW (OUTPATIENT)
Age: 76
End: 2021-05-12

## 2021-05-12 LAB — SURGICAL PATHOLOGY STUDY: SIGNIFICANT CHANGE UP

## 2021-05-16 PROBLEM — R91.1 LUNG NODULE SEEN ON IMAGING STUDY: Status: ACTIVE | Noted: 2019-12-10

## 2021-05-17 ENCOUNTER — NON-APPOINTMENT (OUTPATIENT)
Age: 76
End: 2021-05-17

## 2021-05-17 ENCOUNTER — APPOINTMENT (OUTPATIENT)
Dept: THORACIC SURGERY | Facility: CLINIC | Age: 76
End: 2021-05-17
Payer: MEDICARE

## 2021-05-17 ENCOUNTER — APPOINTMENT (OUTPATIENT)
Dept: RADIOLOGY | Facility: CLINIC | Age: 76
End: 2021-05-17
Payer: MEDICARE

## 2021-05-17 ENCOUNTER — OUTPATIENT (OUTPATIENT)
Dept: OUTPATIENT SERVICES | Facility: HOSPITAL | Age: 76
LOS: 1 days | End: 2021-05-17
Payer: MEDICARE

## 2021-05-17 VITALS
HEART RATE: 96 BPM | OXYGEN SATURATION: 96 % | HEIGHT: 64 IN | BODY MASS INDEX: 29.19 KG/M2 | WEIGHT: 171 LBS | SYSTOLIC BLOOD PRESSURE: 141 MMHG | RESPIRATION RATE: 18 BRPM | DIASTOLIC BLOOD PRESSURE: 81 MMHG

## 2021-05-17 DIAGNOSIS — R91.1 SOLITARY PULMONARY NODULE: ICD-10-CM

## 2021-05-17 DIAGNOSIS — R06.02 SHORTNESS OF BREATH: ICD-10-CM

## 2021-05-17 DIAGNOSIS — Z98.890 OTHER SPECIFIED POSTPROCEDURAL STATES: Chronic | ICD-10-CM

## 2021-05-17 PROCEDURE — 99024 POSTOP FOLLOW-UP VISIT: CPT

## 2021-05-17 PROCEDURE — 71046 X-RAY EXAM CHEST 2 VIEWS: CPT | Mod: 26

## 2021-05-17 PROCEDURE — 71046 X-RAY EXAM CHEST 2 VIEWS: CPT

## 2021-05-17 NOTE — ASSESSMENT
[FreeTextEntry1] : Gracie is a 76-year-old female was recently resected. Stage I lung cancer. She does complain of generalized fatigue in intermittent cough over the past 2 days. I do believe this is part of her normal recovery. I will be obtaining a chest x-ray today and have recommended over-the-counter cough suppressants. Based on her new pathology. I have recommended oncology followup and the like to see her after her first surveillance scan is completed.\par \par Thank you for allowing me to participate in the care of your patient.\par \par Eliezer Garcia MD\[facility] Department of Cardiovascular and Thoracic Surgery\par \[facility] [employer] [facility] [place]\par

## 2021-05-17 NOTE — DATA REVIEWED
[FreeTextEntry1] : 5.4.21 Pathology from [facility] Pathology\par Date of Procedure:  5/4/2021\par \par 1.  Lung, left lower lobe, wedge resection\par - Adenocarcinoma, acinar type\par - Visceral pleural invasion is not identified\par - Peribronchiolar metaplasia is present in slides 1G and 1H\par \par 3.  Lung, left lower lobe, completion lobectomy\par - Peribronchiolar metaplasia (slides 3D and 3E)\par

## 2021-05-17 NOTE — CONSULT LETTER
[Dear  ___] : Dear  [unfilled], [Consult Letter:] : I had the pleasure of evaluating your patient, [unfilled]. [Please see my note below.] : Please see my note below. [Consult Closing:] : Thank you very much for allowing me to participate in the care of this patient.  If you have any questions, please do not hesitate to contact me. [Sincerely,] : Sincerely, [DrMayelin  ___] : Dr. NORRIS [DrMayelin ___] : Dr. NORRIS [FreeTextEntry2] : Dr. Bravo [FreeTextEntry3] : Eliezer Garcia MD\par Department of Cardiovascular and Thoracic Surgery\par \par [employer] [facility]\par [facility]

## 2021-05-17 NOTE — HISTORY OF PRESENT ILLNESS
[FreeTextEntry1] : Ms. MACK is a 76 year old female referred by Dr. Bravo  for a  left lung nodule. She is status post flexible bronchoscopy, left thoracoscopy, left upper lobe wedge resection, left lower lobectomy, mediastinal lymph node dissection, partial pulmonary decortication, multilevel intercostal nerve blocks on 5.4.21. \par \par Today she reports generalized achiness,  shortness of breath with climbing up/down stairs or with exertion, non-productive cough with Fever last night, however she denies  dizziness, unintentional weight loss or chills. She is here to discuss her progress. \par

## 2021-05-17 NOTE — REVIEW OF SYSTEMS
[As Noted in HPI] : as noted in HPI [Negative] : Neurological [FreeTextEntry9] : generalized achniess

## 2021-05-17 NOTE — DATA REVIEWED
[FreeTextEntry1] : 5.4.21 Pathology from [facility] Pathology\par Date of Procedure:  5/4/2021\par \par 1.  Lung, left lower lobe, wedge resection\par - Adenocarcinoma, acinar type\par - Visceral pleural invasion is not identified\par - Peribronchiolar metaplasia is present in slides 1G and 1H\par \par 3.  Lung, left lower lobe, completion lobectomy\par - Peribronchiolar metaplasia (slides 3D and 3E)\par

## 2021-05-17 NOTE — CONSULT LETTER
[Dear  ___] : Dear  [unfilled], [Consult Letter:] : I had the pleasure of evaluating your patient, [unfilled]. [Please see my note below.] : Please see my note below. [Consult Closing:] : Thank you very much for allowing me to participate in the care of this patient.  If you have any questions, please do not hesitate to contact me. [Sincerely,] : Sincerely, [DrMayelin  ___] : Dr. NORRIS [DrMayelin ___] : Dr. NORRIS [FreeTextEntry2] : Dr. Bravo [FreeTextEntry3] : Eliezer Garcia MD\par Department of Cardiovascular and Thoracic Surgery\par \par [employer]\par [facility] at [facility]

## 2021-05-17 NOTE — ASSESSMENT
[FreeTextEntry1] : Gracie is a 76-year-old female was recently resected. Stage I lung cancer. She does complain of generalized fatigue in intermittent cough over the past 2 days. I do believe this is part of her normal recovery. I will be obtaining a chest x-ray today and have recommended over-the-counter cough suppressants. Based on her new pathology. I have recommended oncology followup and the like to see her after her first surveillance scan is completed.\par \par Thank you for allowing me to participate in the care of your patient.\par \par Eliezer Garcia MD\[facility] Department of Cardiovascular and Thoracic Surgery\par \[facility] [employer]\[facility] at [facility]\par

## 2021-05-18 ENCOUNTER — EMERGENCY (EMERGENCY)
Facility: HOSPITAL | Age: 76
LOS: 1 days | Discharge: HOME | End: 2021-05-18
Attending: STUDENT IN AN ORGANIZED HEALTH CARE EDUCATION/TRAINING PROGRAM
Payer: MEDICARE

## 2021-05-18 ENCOUNTER — TRANSCRIPTION ENCOUNTER (OUTPATIENT)
Age: 76
End: 2021-05-18

## 2021-05-18 ENCOUNTER — NON-APPOINTMENT (OUTPATIENT)
Age: 76
End: 2021-05-18

## 2021-05-18 VITALS — WEIGHT: 169.98 LBS | HEIGHT: 63 IN

## 2021-05-18 DIAGNOSIS — Z98.890 OTHER SPECIFIED POSTPROCEDURAL STATES: Chronic | ICD-10-CM

## 2021-05-18 LAB
ALBUMIN SERPL ELPH-MCNC: 3.5 G/DL — SIGNIFICANT CHANGE UP (ref 3.3–5.2)
ALP SERPL-CCNC: 107 U/L — SIGNIFICANT CHANGE UP (ref 40–120)
ALT FLD-CCNC: 21 U/L — SIGNIFICANT CHANGE UP
ANION GAP SERPL CALC-SCNC: 14 MMOL/L — SIGNIFICANT CHANGE UP (ref 5–17)
AST SERPL-CCNC: 22 U/L — SIGNIFICANT CHANGE UP
BASOPHILS # BLD AUTO: 0.08 K/UL — SIGNIFICANT CHANGE UP (ref 0–0.2)
BASOPHILS NFR BLD AUTO: 0.7 % — SIGNIFICANT CHANGE UP (ref 0–2)
BILIRUB SERPL-MCNC: 0.2 MG/DL — LOW (ref 0.4–2)
BUN SERPL-MCNC: 20 MG/DL — SIGNIFICANT CHANGE UP (ref 8–20)
CALCIUM SERPL-MCNC: 10 MG/DL — SIGNIFICANT CHANGE UP (ref 8.6–10.2)
CHLORIDE SERPL-SCNC: 99 MMOL/L — SIGNIFICANT CHANGE UP (ref 98–107)
CO2 SERPL-SCNC: 28 MMOL/L — SIGNIFICANT CHANGE UP (ref 22–29)
CREAT SERPL-MCNC: 0.74 MG/DL — SIGNIFICANT CHANGE UP (ref 0.5–1.3)
EOSINOPHIL # BLD AUTO: 0.14 K/UL — SIGNIFICANT CHANGE UP (ref 0–0.5)
EOSINOPHIL NFR BLD AUTO: 1.2 % — SIGNIFICANT CHANGE UP (ref 0–6)
GLUCOSE SERPL-MCNC: 120 MG/DL — HIGH (ref 70–99)
HCT VFR BLD CALC: 34.8 % — SIGNIFICANT CHANGE UP (ref 34.5–45)
HGB BLD-MCNC: 11.3 G/DL — LOW (ref 11.5–15.5)
IMM GRANULOCYTES NFR BLD AUTO: 0.5 % — SIGNIFICANT CHANGE UP (ref 0–1.5)
LYMPHOCYTES # BLD AUTO: 1.39 K/UL — SIGNIFICANT CHANGE UP (ref 1–3.3)
LYMPHOCYTES # BLD AUTO: 11.7 % — LOW (ref 13–44)
MAGNESIUM SERPL-MCNC: 2.2 MG/DL — SIGNIFICANT CHANGE UP (ref 1.6–2.6)
MCHC RBC-ENTMCNC: 30.5 PG — SIGNIFICANT CHANGE UP (ref 27–34)
MCHC RBC-ENTMCNC: 32.5 GM/DL — SIGNIFICANT CHANGE UP (ref 32–36)
MCV RBC AUTO: 93.8 FL — SIGNIFICANT CHANGE UP (ref 80–100)
MONOCYTES # BLD AUTO: 0.94 K/UL — HIGH (ref 0–0.9)
MONOCYTES NFR BLD AUTO: 7.9 % — SIGNIFICANT CHANGE UP (ref 2–14)
NEUTROPHILS # BLD AUTO: 9.31 K/UL — HIGH (ref 1.8–7.4)
NEUTROPHILS NFR BLD AUTO: 78 % — HIGH (ref 43–77)
NT-PROBNP SERPL-SCNC: 79 PG/ML — SIGNIFICANT CHANGE UP (ref 0–300)
PLATELET # BLD AUTO: 364 K/UL — SIGNIFICANT CHANGE UP (ref 150–400)
POTASSIUM SERPL-MCNC: 4.5 MMOL/L — SIGNIFICANT CHANGE UP (ref 3.5–5.3)
POTASSIUM SERPL-SCNC: 4.5 MMOL/L — SIGNIFICANT CHANGE UP (ref 3.5–5.3)
PROT SERPL-MCNC: 7.4 G/DL — SIGNIFICANT CHANGE UP (ref 6.6–8.7)
RBC # BLD: 3.71 M/UL — LOW (ref 3.8–5.2)
RBC # FLD: 12.2 % — SIGNIFICANT CHANGE UP (ref 10.3–14.5)
SODIUM SERPL-SCNC: 141 MMOL/L — SIGNIFICANT CHANGE UP (ref 135–145)
TROPONIN T SERPL-MCNC: <0.01 NG/ML — SIGNIFICANT CHANGE UP (ref 0–0.06)
WBC # BLD: 11.92 K/UL — HIGH (ref 3.8–10.5)
WBC # FLD AUTO: 11.92 K/UL — HIGH (ref 3.8–10.5)

## 2021-05-18 PROCEDURE — 99285 EMERGENCY DEPT VISIT HI MDM: CPT

## 2021-05-18 PROCEDURE — 93010 ELECTROCARDIOGRAM REPORT: CPT

## 2021-05-18 RX ORDER — ALPRAZOLAM 0.25 MG
0.25 TABLET ORAL ONCE
Refills: 0 | Status: DISCONTINUED | OUTPATIENT
Start: 2021-05-18 | End: 2021-05-18

## 2021-05-18 NOTE — ED PROVIDER NOTE - PROGRESS NOTE DETAILS
As per sign-out from Dr. Chin, patient pending CT chest and thoracic surgery consultation. As per sign-out from Dr. Chin, patient recently treated for a acute pneumothorax with Dr. Garcia whom presents now with discomfort. CT surgery team contacted and consulted they recommend CT and will come to evaluate the patient. Patient has remained stable. Currently awaiting completion of ct surgery consultation. PA contacted and awaiting discussion with Dr. Garcia to confirm discharge home or possible admission. Patient stable. CT surgery discussed case with Dr. Garcia and she is cleared for out-patient follow-up.

## 2021-05-18 NOTE — ED PROVIDER NOTE - NSFOLLOWUPINSTRUCTIONS_ED_ALL_ED_FT
Pleural Effusion    WHAT YOU NEED TO KNOW:    Pleural effusion is fluid buildup in the space between the layers of the pleura. The pleura is a thin piece of tissue with 2 layers. One layer rests directly on the lungs. The other rests on the chest wall. There is normally a small amount of fluid between these layers. This fluid helps your lungs move easily when you breathe.    The Lungs         DISCHARGE INSTRUCTIONS:    Call your doctor if:   •You feel faint, or you cannot think clearly.      •Your lips or fingernails turn blue.      •You find it very hard to breathe.      •You have a fever.       •Your breathing problems do not go away or get worse.      •Your pain does not go away or gets worse.      •You cough up yellow, green, gray, or bloody mucus.      •You have questions or concerns about your condition or care.      Medicines: You may need any of the following:   •Diuretics may help decrease extra fluid caused by heart failure or other problems.      •Antibiotics help prevent or treat an infection caused by bacteria.      •NSAIDs help decrease swelling and pain or fever. This medicine is available with or without a doctor's order. NSAIDs can cause stomach bleeding or kidney problems in certain people. If you take blood thinner medicine, always ask your healthcare provider if NSAIDs are safe for you. Always read the medicine label and follow directions.      •Prescription pain medicine may be given. Ask your healthcare provider how to take this medicine safely. Some prescription pain medicines contain acetaminophen. Do not take other medicines that contain acetaminophen without talking to your healthcare provider. Too much acetaminophen may cause liver damage. Prescription pain medicine may cause constipation. Ask your healthcare provider how to prevent or treat constipation.       •Steroids or other types of medicines may be given to decrease swelling.       •Take your medicine as directed. Contact your healthcare provider if you think your medicine is not helping or if you have side effects. Tell him or her if you are allergic to any medicine. Keep a list of the medicines, vitamins, and herbs you take. Include the amounts, and when and why you take them. Bring the list or the pill bottles to follow-up visits. Carry your medicine list with you in case of an emergency.      Follow up with your healthcare provider as directed: Write down your questions so you remember to ask them during your visits.    Self-care:   •Use pressure to decrease pain. Hold a pillow against your chest when you cough or take a deep breath.      •Do not smoke, and do not allow others to smoke around you. If you smoke, it is never too late to quit. Smoking increases your risk for lung infections such as pneumonia. Smoking also makes it harder for you to get better after having a lung problem. Ask your healthcare provider for information if you need help quitting.      •Drink liquids as directed and rest as needed. Liquids help to keep your air passages moist and better able to get rid of germs and other irritants. Ask your healthcare provider how much liquid to drink each day and which liquids are best for you. You may feel like resting more. Slowly start to do more each day. Rest when you feel it is needed.      •Deep breathing and coughing will decrease your risk for a lung infection. Take a deep breath and hold it for as long as you can. Let the air out and then cough strongly. Deep breaths help open your airway. You may be given an incentive spirometer to help you take deep breaths. Put the plastic piece in your mouth and take a slow, deep breath. Then let the air out and cough. Repeat these steps 10 times every hour.   How to use and Incentive Spirometer              © Copyright [employer] 2021

## 2021-05-18 NOTE — ED PROVIDER NOTE - OBJECTIVE STATEMENT
The patient is a 76 year old female presents with chest discomfort with cough and seen at the urgent care and found to have L sided pleural effusion.  The patient recently has history of L sided stage 1 lung cancer s/p partial lobectomy.  No fever, No abd pain, No back pain, No motor No sensory loss

## 2021-05-18 NOTE — ED ADULT TRIAGE NOTE - CHIEF COMPLAINT QUOTE
Pt arrives to ED c/o chest heaviness onset few days ago. Pt had thoracic surgery done  by dr. Garcia , had check up in his office yesterday ,  was told has fluid in the lung , symptoms got worse today

## 2021-05-18 NOTE — ED PROVIDER NOTE - PATIENT PORTAL LINK FT
You can access the FollowMyHealth Patient Portal offered by [facility] by registering at the following website: [email or web address]. By joining [facility]’s FollowMyHealth portal, you will also be able to view your health information using other applications (apps) compatible with our system.

## 2021-05-19 ENCOUNTER — NON-APPOINTMENT (OUTPATIENT)
Age: 76
End: 2021-05-19

## 2021-05-19 VITALS
HEART RATE: 79 BPM | OXYGEN SATURATION: 95 % | TEMPERATURE: 98 F | DIASTOLIC BLOOD PRESSURE: 53 MMHG | RESPIRATION RATE: 24 BRPM | SYSTOLIC BLOOD PRESSURE: 96 MMHG

## 2021-05-19 LAB
CK SERPL-CCNC: 48 U/L — SIGNIFICANT CHANGE UP (ref 25–170)
TROPONIN T SERPL-MCNC: <0.01 NG/ML — SIGNIFICANT CHANGE UP (ref 0–0.06)

## 2021-05-19 PROCEDURE — 99284 EMERGENCY DEPT VISIT MOD MDM: CPT | Mod: 25

## 2021-05-19 PROCEDURE — 83880 ASSAY OF NATRIURETIC PEPTIDE: CPT

## 2021-05-19 PROCEDURE — 85025 COMPLETE CBC W/AUTO DIFF WBC: CPT

## 2021-05-19 PROCEDURE — 82550 ASSAY OF CK (CPK): CPT

## 2021-05-19 PROCEDURE — 71275 CT ANGIOGRAPHY CHEST: CPT

## 2021-05-19 PROCEDURE — 83735 ASSAY OF MAGNESIUM: CPT

## 2021-05-19 PROCEDURE — 71275 CT ANGIOGRAPHY CHEST: CPT | Mod: 26,MA

## 2021-05-19 PROCEDURE — 36415 COLL VENOUS BLD VENIPUNCTURE: CPT

## 2021-05-19 PROCEDURE — 93005 ELECTROCARDIOGRAM TRACING: CPT

## 2021-05-19 PROCEDURE — 80053 COMPREHEN METABOLIC PANEL: CPT

## 2021-05-19 PROCEDURE — 84484 ASSAY OF TROPONIN QUANT: CPT

## 2021-05-19 RX ORDER — ACETAMINOPHEN 500 MG
650 TABLET ORAL EVERY 6 HOURS
Refills: 0 | Status: DISCONTINUED | OUTPATIENT
Start: 2021-05-19 | End: 2021-05-23

## 2021-05-19 NOTE — PROGRESS NOTE ADULT - SUBJECTIVE AND OBJECTIVE BOX
POD # 15 s/p Flexible bronchoscopy, VATS left lower lobe lobectomy, and partial decortication    HPI: 76 year old female with a medical history of MCKENNA (no CPAP), HTN, HLD, former smoker (1.5PPD x28 years), with lung nodule, now s/p left lower lobectomy and partial decortication via Left VATS on 5/4/21 with Dr. Garcia. Tissue pathology +Adenocarcinoma. Now patient presenting with persistent MICHAELS, chest pressure, and anxiety.     PAST MEDICAL & SURGICAL HISTORY:  Solitary pulmonary nodule  Hypertension  Hyperlipidemia  OA (osteoarthritis), bilateral knee  History of colonoscopy    FAMILY HISTORY: Family hx of lung cancer (Mother)    Subjective: Patient lying on stretcher in ED in no acute distress. States she has had MICHAELS since her surgery. Saw Dr. Garcia in the office 2 days ago and was given a "water pill" since she had some fluid around her lung. States she had concern for some "chest pressure" so she came into the ED. She states the pressure doesn't radiate anywhere and she "just doesn't feel right" since her surgery, but then states "It might all be in my head". Patient admits to being anxious being in the hospital. Denies fevers, chills, lightheadedness, dizziness, HA, CP, palpitations, cough, abdominal pain, N/V, diarrhea, numbness/tingling in extremities, or any other acute complaints.  ROS negative x 10 systems except as noted above.    MEDICATIONS  (STANDING):    MEDICATIONS  (PRN):  ALPRAZolam 0.25 milliGRAM(s) Oral Once PRN anxiety    Allergies: No Known Allergies    Vitals   ICU Vital Signs Last 24 Hrs  HR: 107 (18 May 2021 23:49) (107 - 107)  BP: 145/70 (18 May 2021 23:49) (145/70 - 145/70)  SpO2: 99% (18 May 2021 23:49) (99% - 99%)    Physical Exam  Neuro: A+O x 3, non-focal, speech clear and intact  HEENT:  NCAT, PERRL, EOMI. No conjuctival edema or icterus, no thrush.    Neck:  Supple, trachea midline  Pulm: +Crackles Left lower lung base, otherwise CTA, no rales/wheezing appreciated, no accessory muscle use noted  CV: regular rate, regular rhythm, +S1S2, no murmur or rub noted  Abd: soft, NT, ND, + BS  Ext: GEORGES x 4, no edema, no cyanosis or clubbing, distal motor/neuro/circ intact  Skin: warm, dry, well perfused  Incisions: Left VATS incision C/D/I    LABS                        11.3   11.92 )-----------( 364      ( 18 May 2021 20:38 )             34.8     05-18    141  |  99  |  20.0  ----------------------------<  120<H>  4.5   |  28.0  |  0.74    Ca    10.0      18 May 2021 20:38  Mg     2.2     05-18    TPro  7.4  /  Alb  3.5  /  TBili  0.2<L>  /  DBili  x   /  AST  22  /  ALT  21  /  AlkPhos  107  05-18    CARDIAC MARKERS ( 19 May 2021 00:00 )  CK48 U/L / CKMBx     / Troponin Tx     /  CARDIAC MARKERS ( 18 May 2021 23:59 )  CKx     / CKMBx     / Troponin T<0.01 ng/mL /  CARDIAC MARKERS ( 18 May 2021 20:38 )  CK51 U/L / CKMBx     / Troponin T<0.01 ng/mL /    Last CXR:  < from: Xray Chest 2 Views PA/Lat (05.17.21 @ 11:49) >  FINDINGS:  There is an increasing moderate LEFT pleural effusion and/or basilar airspace consolidation obscuring LEFT diaphragm contour. RIGHT lung clear.  The airway is midline.  The  heart is enlarged in transverse diameter. No hilar mass.  The visualized osseus structures are intact.  IMPRESSION:  Increasing LEFT pleural effusion and/or basilar airspace consolidation.  < end of copied text >    < from: CT Angio Chest w/ IV Cont (05.19.21 @ 00:20) >  FINDINGS:  LUNGS AND AIRWAYS: Patent central airways. Status post partial left lower lobectomy. Biapical scarring. Bibasilar subsegmental atelectasis. Compressive atelectasis in the left lung base. No pulmonary nodules or masses.  PLEURA: Moderate left pleural effusion with a loculated component anteriorly.  MEDIASTINUM AND FRIEDA: No lymphadenopathy.  VESSELS: No pulmonary embolism.  HEART: Heart size is enlarged. No pericardial effusion.  CHEST WALL AND LOWER NECK: Within normal limits.  VISUALIZED UPPER ABDOMEN: Tiny hepatic cyst. Left renal cyst. 1.2 x 1.1 cm left adrenal adenoma. Indeterminate 1.8 x 1.4 cm left adrenal nodule. Small to moderate-sized hiatal hernia.  BONES: Degenerative changes of the spine.  IMPRESSION:  1. No pulmonary embolism.  2. Moderate partially loculated left pleural effusion with adjacent compressive atelectasis.  3. Left adrenal adenoma. Additional indeterminate left adrenal nodule. If clinically warranted, a nonemergent contrast-enhanced MR adrenal protocol could be performed for further evaluation.  < end of copied text >

## 2021-05-19 NOTE — PROGRESS NOTE ADULT - ASSESSMENT
76 year old female with a medical history of MCKENNA (no CPAP), HTN, HLD, former smoker (1.5PPD x28 years), with lung nodule, now s/p left lower lobectomy and partial decortication via Left VATS on 5/4/21 with Dr. Garcia. Tissue pathology +Adenocarcinoma. Now patient presenting with persistent MICHAELS, chest pressure, and anxiety.

## 2021-05-19 NOTE — PROGRESS NOTE ADULT - PROBLEM SELECTOR PLAN 1
S/p Left VATS, LLL Lobectomy on 5/4/21.   Surgical pathology +Adenocarinoma.   Patient now with LLL pleural effusion and chest pressure.   Cardiac enzymes are negative.   SpO2 remains stable on room air.   Continue diuresis as tolerated.   Encourage OOB, ambulate as tolerated. Ambulating well.  Encourage incentive spirometry use while awake.   PRN Motrin/ Tylenol for pain.  Tolerating diet.  CT Chest to be reviewed with Dr. Garcia this AM. S/p Left VATS, LLL Lobectomy on 5/4/21.   Surgical pathology +Adenocarinoma.   Patient now with LLL pleural effusion and chest pressure.   Cardiac enzymes are negative.   SpO2 remains stable on room air.   Continue diuresis as tolerated.   Encourage OOB, ambulate as tolerated. Ambulating well.  Encourage incentive spirometry use while awake.   PRN Motrin/ Tylenol for pain.  Tolerating diet.  CT Chest reviewed with Thoracic Surgeon Dr. Garcia this AM.   OK to d/c from ED with 3 day supply of Lasix 40mg PO QD.  Please have patient call the Thoracic Surgery office at [phone] to schedule a follow up for later this week.

## 2021-05-19 NOTE — ED ADULT NURSE NOTE - OBJECTIVE STATEMENT
received pt alert and orientedx,3 vitals ate stable. no chest pain at this moment, sating well on room air, ambulates well. safety maintained , continue to monitor

## 2021-05-19 NOTE — PROGRESS NOTE ADULT - PROVIDER SPECIALTY LIST ADULT

## 2021-05-22 ENCOUNTER — OUTPATIENT (OUTPATIENT)
Dept: OUTPATIENT SERVICES | Facility: HOSPITAL | Age: 76
LOS: 1 days | End: 2021-05-22
Payer: MEDICARE

## 2021-05-22 ENCOUNTER — APPOINTMENT (OUTPATIENT)
Dept: RADIOLOGY | Facility: CLINIC | Age: 76
End: 2021-05-22
Payer: MEDICARE

## 2021-05-22 DIAGNOSIS — R06.02 SHORTNESS OF BREATH: ICD-10-CM

## 2021-05-22 DIAGNOSIS — Z98.890 OTHER SPECIFIED POSTPROCEDURAL STATES: Chronic | ICD-10-CM

## 2021-05-22 PROCEDURE — 71046 X-RAY EXAM CHEST 2 VIEWS: CPT

## 2021-05-22 PROCEDURE — 71046 X-RAY EXAM CHEST 2 VIEWS: CPT | Mod: 26

## 2021-05-24 ENCOUNTER — APPOINTMENT (OUTPATIENT)
Dept: THORACIC SURGERY | Facility: CLINIC | Age: 76
End: 2021-05-24
Payer: MEDICARE

## 2021-05-24 VITALS
HEART RATE: 78 BPM | OXYGEN SATURATION: 98 % | RESPIRATION RATE: 16 BRPM | SYSTOLIC BLOOD PRESSURE: 133 MMHG | HEIGHT: 64 IN | DIASTOLIC BLOOD PRESSURE: 73 MMHG | BODY MASS INDEX: 29.19 KG/M2 | WEIGHT: 171 LBS

## 2021-05-24 PROCEDURE — 99024 POSTOP FOLLOW-UP VISIT: CPT

## 2021-05-24 RX ORDER — ALPRAZOLAM 0.25 MG/1
0.25 TABLET ORAL
Qty: 10 | Refills: 0 | Status: COMPLETED | COMMUNITY
Start: 2021-04-29

## 2021-05-24 RX ORDER — DICLOFENAC SODIUM 75 MG/1
75 TABLET, DELAYED RELEASE ORAL
Qty: 30 | Refills: 0 | Status: COMPLETED | COMMUNITY
Start: 2020-12-08

## 2021-05-24 RX ORDER — AZITHROMYCIN 250 MG/1
250 TABLET, FILM COATED ORAL
Qty: 6 | Refills: 0 | Status: COMPLETED | COMMUNITY
Start: 2021-02-01

## 2021-05-24 RX ORDER — CHLORHEXIDINE GLUCONATE, 0.12% ORAL RINSE 1.2 MG/ML
0.12 SOLUTION DENTAL
Qty: 473 | Refills: 0 | Status: COMPLETED | COMMUNITY
Start: 2021-01-20

## 2021-05-24 RX ORDER — METHYLPREDNISOLONE 4 MG/1
4 TABLET ORAL
Qty: 21 | Refills: 0 | Status: COMPLETED | COMMUNITY
Start: 2021-02-08

## 2021-05-24 RX ORDER — GABAPENTIN 300 MG/1
300 CAPSULE ORAL
Qty: 60 | Refills: 0 | Status: COMPLETED | COMMUNITY
Start: 2021-01-07

## 2021-05-24 RX ORDER — AMOXICILLIN 500 MG/1
500 CAPSULE ORAL
Qty: 15 | Refills: 0 | Status: COMPLETED | COMMUNITY
Start: 2021-04-12

## 2021-05-24 RX ORDER — IBUPROFEN 600 MG/1
600 TABLET, FILM COATED ORAL
Qty: 20 | Refills: 0 | Status: ACTIVE | COMMUNITY
Start: 2021-04-12

## 2021-05-24 RX ORDER — ROSUVASTATIN CALCIUM 40 MG/1
40 TABLET, FILM COATED ORAL
Qty: 90 | Refills: 0 | Status: COMPLETED | COMMUNITY
Start: 2021-03-19

## 2021-05-24 RX ORDER — TRIAMCINOLONE ACETONIDE 1 MG/G
0.1 PASTE DENTAL
Qty: 5 | Refills: 0 | Status: COMPLETED | COMMUNITY
Start: 2021-04-23

## 2021-05-24 NOTE — PHYSICAL EXAM
[] : no respiratory distress [Auscultation Breath Sounds / Voice Sounds] : lungs were clear to auscultation bilaterally [Heart Rate And Rhythm] : heart rate was normal and rhythm regular [Heart Sounds] : normal S1 and S2 [Heart Sounds Gallop] : no gallops [Murmurs] : no murmurs [Heart Sounds Pericardial Friction Rub] : no pericardial rub [Examination Of The Chest] : the chest was normal in appearance [Chest Visual Inspection Thoracic Asymmetry] : no chest asymmetry [Diminished Respiratory Excursion] : normal chest expansion [No Spinal Tenderness] : no spinal tenderness

## 2021-05-24 NOTE — DATA REVIEWED
[FreeTextEntry1] : 5.19.21 CTA from [facility] \par 1. No pulmonary embolism.\par 2. Moderate partially loculated left pleural effusion with adjacent compressive atelectasis.\par 3. Left adrenal adenoma. Additional indeterminate left adrenal nodule. If clinically warranted, a nonemergent contrast-enhanced MR adrenal protocol could be performed for further evaluation.\par \par  5.18.21 Chest Xray [facility] \par -Left pleural effusion similar to the prior study.\par -Left lower lobe atelectasis or infiltrate\par \par 5.4.21 Pathology from [facility]\par Date of Procedure:  5/4/2021\par 1.  Lung, left lower lobe, wedge resection\par - Adenocarcinoma, acinar type\par - Visceral pleural invasion is not identified\par - Peribronchiolar metaplasia is present in slides 1G and 1H\par \par 3.  Lung, left lower lobe, completion lobectomy\par - Peribronchiolar metaplasia (slides 3D and 3E)\par

## 2021-05-24 NOTE — HISTORY OF PRESENT ILLNESS
[FreeTextEntry1] : Ms. MACK is a 76 year old female referred by Dr. Bravo for a left lung nodule. She is status post flexible bronchoscopy, left thoracoscopy, left upper lobe wedge resection, left lower lobectomy, mediastinal lymph node dissection, partial pulmonary decortication, multilevel intercostal nerve blocks on 5.4.21. \par \par During our last visit she reported  shortness of breath with climbing up/down stairs or with exertion,  non-productive cough with fever last week, however she denied  dizziness, unintentional weight loss or chills. A Chest xray was ordered that revealed increasing left  pleural effusion and/or basilar airspace consolidation. Lasix PO was started for 3 days and she was advised to follow up in 1 week  with a repeat chest xray. The following day she called our office  with reports  of worsening symptoms and she was evaluated at [facility]    on  5 19.21. A  CTA was ordered  indicating  moderate partially loculated left pleural effusion with adjacent compressive atelectasis. Three additional days of Lasix PO was ordered and plans for a thoracentesis the near future was planned. She is here to discuss her progression.

## 2021-05-24 NOTE — ASSESSMENT
[FreeTextEntry1] : Gracie was in the office today for followup. She states she is feeling much better and her shortness of breath and cough resolved. She has remained afebrile as well. She is scheduled to undergo an oncology follow up in the future, but I suspect continued surveillance will be appropriate going forward.\par \[facility] Thank you for allowing me to participate in the care of your patient.\par \par Eliezer Garcia MD\[facility] Department of Cardiovascular and Thoracic Surgery\[facility] \par [employer]\[facility] [employer] [facility]\par

## 2021-06-02 ENCOUNTER — OUTPATIENT (OUTPATIENT)
Dept: OUTPATIENT SERVICES | Facility: HOSPITAL | Age: 76
LOS: 1 days | Discharge: HOME | End: 2021-06-02

## 2021-06-02 DIAGNOSIS — Z98.890 OTHER SPECIFIED POSTPROCEDURAL STATES: Chronic | ICD-10-CM

## 2021-06-02 DIAGNOSIS — C34.90 MALIGNANT NEOPLASM OF UNSPECIFIED PART OF UNSPECIFIED BRONCHUS OR LUNG: ICD-10-CM

## 2021-06-03 ENCOUNTER — RESULT REVIEW (OUTPATIENT)
Age: 76
End: 2021-06-03

## 2021-06-03 ENCOUNTER — APPOINTMENT (OUTPATIENT)
Dept: HEMATOLOGY ONCOLOGY | Facility: CLINIC | Age: 76
End: 2021-06-03
Payer: MEDICARE

## 2021-06-03 VITALS
DIASTOLIC BLOOD PRESSURE: 67 MMHG | HEIGHT: 63.25 IN | SYSTOLIC BLOOD PRESSURE: 118 MMHG | OXYGEN SATURATION: 97 % | WEIGHT: 177 LBS | BODY MASS INDEX: 30.97 KG/M2 | HEART RATE: 67 BPM

## 2021-06-03 LAB
BASOPHILS # BLD AUTO: 0.1 K/UL — SIGNIFICANT CHANGE UP (ref 0–0.2)
BASOPHILS NFR BLD AUTO: 0.9 % — SIGNIFICANT CHANGE UP (ref 0–2)
EOSINOPHIL # BLD AUTO: 0.3 K/UL — SIGNIFICANT CHANGE UP (ref 0–0.5)
EOSINOPHIL NFR BLD AUTO: 4.2 % — SIGNIFICANT CHANGE UP (ref 0–6)
HCT VFR BLD CALC: 38.9 % — SIGNIFICANT CHANGE UP (ref 34.5–45)
HGB BLD-MCNC: 12.6 G/DL — SIGNIFICANT CHANGE UP (ref 11.5–15.5)
LYMPHOCYTES # BLD AUTO: 1.3 K/UL — SIGNIFICANT CHANGE UP (ref 1–3.3)
LYMPHOCYTES # BLD AUTO: 16.7 % — SIGNIFICANT CHANGE UP (ref 13–44)
MCHC RBC-ENTMCNC: 30.4 PG — SIGNIFICANT CHANGE UP (ref 27–34)
MCHC RBC-ENTMCNC: 32.3 G/DL — SIGNIFICANT CHANGE UP (ref 32–36)
MCV RBC AUTO: 94.2 FL — SIGNIFICANT CHANGE UP (ref 80–100)
MONOCYTES # BLD AUTO: 0.5 K/UL — SIGNIFICANT CHANGE UP (ref 0–0.9)
MONOCYTES NFR BLD AUTO: 6.1 % — SIGNIFICANT CHANGE UP (ref 2–14)
NEUTROPHILS # BLD AUTO: 5.6 K/UL — SIGNIFICANT CHANGE UP (ref 1.8–7.4)
NEUTROPHILS NFR BLD AUTO: 72.1 % — SIGNIFICANT CHANGE UP (ref 43–77)
PLATELET # BLD AUTO: 230 K/UL — SIGNIFICANT CHANGE UP (ref 150–400)
RBC # BLD: 4.13 M/UL — SIGNIFICANT CHANGE UP (ref 3.8–5.2)
RBC # FLD: 12.5 % — SIGNIFICANT CHANGE UP (ref 10.3–14.5)
WBC # BLD: 7.8 K/UL — SIGNIFICANT CHANGE UP (ref 3.8–10.5)
WBC # FLD AUTO: 7.8 K/UL — SIGNIFICANT CHANGE UP (ref 3.8–10.5)

## 2021-06-03 PROCEDURE — 99203 OFFICE O/P NEW LOW 30 MIN: CPT

## 2021-06-04 LAB
ALBUMIN SERPL ELPH-MCNC: 4.2 G/DL
ALP BLD-CCNC: 121 U/L
ALT SERPL-CCNC: 17 U/L
ANION GAP SERPL CALC-SCNC: 12 MMOL/L
APTT BLD: 30.5 SEC
AST SERPL-CCNC: 18 U/L
BILIRUB SERPL-MCNC: 0.3 MG/DL
BUN SERPL-MCNC: 17 MG/DL
CALCIUM SERPL-MCNC: 10.3 MG/DL
CHLORIDE SERPL-SCNC: 101 MMOL/L
CO2 SERPL-SCNC: 29 MMOL/L
CREAT SERPL-MCNC: 0.75 MG/DL
GLUCOSE SERPL-MCNC: 98 MG/DL
HBV CORE IGG+IGM SER QL: NONREACTIVE
HBV SURFACE AB SER QL: NONREACTIVE
HBV SURFACE AG SER QL: NONREACTIVE
HCV AB SER QL: NONREACTIVE
HCV S/CO RATIO: 0.07 S/CO
INR PPP: 0.94 RATIO
POTASSIUM SERPL-SCNC: 4.2 MMOL/L
PROT SERPL-MCNC: 6.9 G/DL
PT BLD: 11.1 SEC
SODIUM SERPL-SCNC: 142 MMOL/L

## 2021-06-06 NOTE — ASSESSMENT
[FreeTextEntry1] : Moderately differentiated adenocarcinoma of the lung, status post left upper lobe wedge resection, left lower lobectomy for stage T1bN0 disease.  Tumor was moderately differentiated, and associated with negative margins and no evidence of visceral pleural or lymphovascular invasion.\par There is no indication for adjuvant chemotherapy or postop radiation therapy in this case.

## 2021-06-06 NOTE — REVIEW OF SYSTEMS
[Fever] : no fever [Cough] : cough [SOB on Exertion] : shortness of breath during exertion [Negative] : Heme/Lymph [FreeTextEntry2] : Mild fatigue

## 2021-06-06 NOTE — HISTORY OF PRESENT ILLNESS
[de-identified] : This 76-year-old former smoker presented with a left lung nodule, and underwent left upper lobe wedge resection and left lower lobectomy on May 4, 2021 for an adenocarcinoma that measured 1.2 cm in diameter.  The lesion was moderately differentiated, showed no evidence of visceral pleural invasion.  All margins were uninvolved with cancer.  15 lymph nodes were negative for tumor.  Surgical stage T1bN0.  PD-L1 tumor proportion score 1 to 3%/positive.\par Recovery from surgery has been good but the patient still demonstrates cough and exertional dyspnea.  She had been readmitted to [facility] in May 12 with a moderate partially loculated left pleural effusion and responded to p.o. Lasix with improved shortness of breath.

## 2021-06-07 ENCOUNTER — APPOINTMENT (OUTPATIENT)
Dept: THORACIC SURGERY | Facility: CLINIC | Age: 76
End: 2021-06-07
Payer: MEDICARE

## 2021-06-07 VITALS — BODY MASS INDEX: 31.36 KG/M2 | WEIGHT: 177 LBS | HEIGHT: 63 IN

## 2021-06-07 PROCEDURE — 99448 NTRPROF PH1/NTRNET/EHR 21-30: CPT

## 2021-06-07 RX ORDER — FUROSEMIDE 40 MG/1
40 TABLET ORAL DAILY
Qty: 3 | Refills: 0 | Status: COMPLETED | COMMUNITY
Start: 2021-05-17 | End: 2021-06-07

## 2021-06-07 RX ORDER — FUROSEMIDE 40 MG/1
40 TABLET ORAL DAILY
Qty: 3 | Refills: 0 | Status: COMPLETED | COMMUNITY
Start: 2021-05-19 | End: 2021-06-07

## 2021-06-07 NOTE — HISTORY OF PRESENT ILLNESS
[FreeTextEntry1] : Ms. MACK is a 76 year old female referred by Dr. Bravo for follow up left lung nodule. She is status post flexible bronchoscopy, left thoracoscopy, left upper lobe wedge resection, left lower lobectomy, mediastinal lymph node dissection, partial pulmonary decortication, multilevel intercostal nerve blocks on 5.4.21. Pathology revealed adenocarcinoma and peribronchiolar metaplasia. \par \par She presented to [facility] on 05/19/21 with worsening shortness of breath and chest discomfort. During workup a CTA revealed a moderate partially loculated left pleural effusion with adjacent compressive atelectasis. She  has completed a  course of   PO Lasix for complaints of  shortness of breath. Today she has ongoing shortness of breath, wheezing at night,  non-productive cough, chest discomfort, and fatigue.5.22.21 Chest xray revealed residual moderate LEFT pleural effusion and/or basilar airspace disease. She has a upcoming CT scan with Dr Lu. She is here to discuss interventional management. \par \par \par \par

## 2021-06-07 NOTE — ASSESSMENT
[FreeTextEntry1] : I spoke with Gracie by telephone today for a followup visit. She does continue to complain of some nighttime wheezing and occasional shortness of breath, but is overall progressing well. I have recommended she follow up with pulmonary medicine in the near future for additional recommendations.\par \par Thank you for allowing me to participate in the care of your patient.\par \par 25 minutes was spent during this encountered.\par \par Eliezer Garcia MD\par Department of Cardiovascular and Thoracic Surgery\par \par [employer]\[facility] [employer] [facility]\par

## 2021-06-07 NOTE — REVIEW OF SYSTEMS
[As Noted in HPI] : as noted in HPI [Shortness Of Breath] : shortness of breath [Wheezing] : wheezing [Negative] : Heme/Lymph [FreeTextEntry2] : Fatigue  [FreeTextEntry5] : chest discomfort

## 2021-06-07 NOTE — CONSULT LETTER
[Dear  ___] : Dear  [unfilled], [Courtesy Letter:] : I had the pleasure of seeing your patient, [unfilled], in my office today. [Please see my note below.] : Please see my note below. [Referral Closing:] : Thank you very much for seeing this patient.  If you have any questions, please do not hesitate to contact me. [Sincerely,] : Sincerely, [DrMayelin  ___] : Dr. NORRIS [DrMayelin ___] : Dr. NORRIS [FreeTextEntry2] : Dr. Bravo  [FreeTextEntry3] : Eliezer Garcia MD\par Department of Cardiovascular and Thoracic Surgery \par  \par Cardiovascular & Thoracic Surgery\par [facility]\par \par [facility]\par [address] [place] [address]\par [place], [place] [address]\par [phone] Tel\par [phone] Fax\par

## 2021-06-07 NOTE — DATA REVIEWED
[FreeTextEntry1] : 5.25.21 Chest Xray  from [facility]   \par Residual moderate LEFT pleural effusion and/or basilar airspace disease. Please see CT scan 5/19/2021\par \par \par \par

## 2021-06-10 ENCOUNTER — APPOINTMENT (OUTPATIENT)
Dept: PULMONOLOGY | Facility: CLINIC | Age: 76
End: 2021-06-10
Payer: MEDICARE

## 2021-06-10 VITALS
HEART RATE: 74 BPM | SYSTOLIC BLOOD PRESSURE: 130 MMHG | TEMPERATURE: 97.4 F | DIASTOLIC BLOOD PRESSURE: 70 MMHG | OXYGEN SATURATION: 96 %

## 2021-06-10 PROCEDURE — 99214 OFFICE O/P EST MOD 30 MIN: CPT

## 2021-06-10 NOTE — PHYSICAL EXAM
[No Acute Distress] : no acute distress [Normal Oropharynx] : normal oropharynx [Normal Appearance] : normal appearance [No Neck Mass] : no neck mass [Normal Rate/Rhythm] : normal rate/rhythm [Normal S1, S2] : normal s1, s2 [No Murmurs] : no murmurs [No Resp Distress] : no resp distress [Clear to Auscultation Bilaterally] : clear to auscultation bilaterally [No Abnormalities] : no abnormalities [Benign] : benign [Normal Gait] : normal gait [No Clubbing] : no clubbing [No Cyanosis] : no cyanosis [No Edema] : no edema [FROM] : FROM [Normal Color/ Pigmentation] : normal color/ pigmentation [No Focal Deficits] : no focal deficits [Oriented x3] : oriented x3 [Normal Affect] : normal affect [TextBox_68] : Decreased breath sounds LLL field about 1/4 up

## 2021-06-10 NOTE — REVIEW OF SYSTEMS
[Wheezing] : wheezing [SOB on Exertion] : sob on exertion [Arthralgias] : arthralgias [Negative] : Endocrine

## 2021-06-10 NOTE — REASON FOR VISIT
[Follow-Up] : a follow-up visit [Lung Cancer] : lung cancer [Shortness of Breath] : shortness of breath

## 2021-07-29 ENCOUNTER — APPOINTMENT (OUTPATIENT)
Dept: PULMONOLOGY | Facility: CLINIC | Age: 76
End: 2021-07-29
Payer: MEDICARE

## 2021-07-29 ENCOUNTER — APPOINTMENT (OUTPATIENT)
Dept: ORTHOPEDIC SURGERY | Facility: HOSPITAL | Age: 76
End: 2021-07-29

## 2021-07-29 VITALS — HEART RATE: 74 BPM | OXYGEN SATURATION: 96 % | SYSTOLIC BLOOD PRESSURE: 130 MMHG | DIASTOLIC BLOOD PRESSURE: 60 MMHG

## 2021-07-29 VITALS — BODY MASS INDEX: 31.65 KG/M2 | WEIGHT: 172 LBS | HEIGHT: 62 IN

## 2021-07-29 DIAGNOSIS — Z23 ENCOUNTER FOR IMMUNIZATION: ICD-10-CM

## 2021-07-29 PROCEDURE — 99214 OFFICE O/P EST MOD 30 MIN: CPT | Mod: 25

## 2021-07-29 PROCEDURE — 94010 BREATHING CAPACITY TEST: CPT

## 2021-07-29 NOTE — PHYSICAL EXAM
[No Acute Distress] : no acute distress [Normal Oropharynx] : normal oropharynx [Normal Appearance] : normal appearance [No Neck Mass] : no neck mass [Normal Rate/Rhythm] : normal rate/rhythm [Normal S1, S2] : normal s1, s2 [No Murmurs] : no murmurs [No Resp Distress] : no resp distress [Clear to Auscultation Bilaterally] : clear to auscultation bilaterally [No Abnormalities] : no abnormalities [Benign] : benign [Normal Gait] : normal gait [No Clubbing] : no clubbing [No Cyanosis] : no cyanosis [No Edema] : no edema [FROM] : FROM [Normal Color/ Pigmentation] : normal color/ pigmentation [No Focal Deficits] : no focal deficits [Oriented x3] : oriented x3 [Normal Affect] : normal affect [TextBox_68] : Improved breath sounds LLL but somewhat diminished.

## 2021-08-18 ENCOUNTER — APPOINTMENT (OUTPATIENT)
Dept: ORTHOPEDIC SURGERY | Facility: CLINIC | Age: 76
End: 2021-08-18

## 2021-09-01 ENCOUNTER — APPOINTMENT (OUTPATIENT)
Dept: ORTHOPEDIC SURGERY | Facility: CLINIC | Age: 76
End: 2021-09-01
Payer: MEDICARE

## 2021-09-01 VITALS
BODY MASS INDEX: 31.47 KG/M2 | DIASTOLIC BLOOD PRESSURE: 72 MMHG | HEART RATE: 69 BPM | WEIGHT: 171 LBS | SYSTOLIC BLOOD PRESSURE: 127 MMHG | HEIGHT: 62 IN

## 2021-09-01 PROCEDURE — 20610 DRAIN/INJ JOINT/BURSA W/O US: CPT | Mod: 50

## 2021-09-01 PROCEDURE — 99214 OFFICE O/P EST MOD 30 MIN: CPT | Mod: 25

## 2021-09-01 PROCEDURE — 73562 X-RAY EXAM OF KNEE 3: CPT | Mod: LT

## 2021-09-01 NOTE — DISCUSSION/SUMMARY
[Medication Risks Reviewed] : Medication risks reviewed [de-identified] : 75 y/o F with bone on bone medial compartmental osteoarthritis of the right knee and near bone on bone medial compartmental osteoarthritis of the left knee. Conservative therapy and surgical options discussed in detail with the patient. Based on imaging, she is a candidate for a right TKA. She is having worsening pain which is decreasing her quality of life. As a result, she is interested in pursuing a right TKA. Although she would like to pursue surgery, she had thoracic surgery 3 months ago for a lung nodule and is still recovering from that surgery. She will pursue the right TKA once she has fully recovered. In the meantime, she opted to receive a cortisone injection in her bilateral knees today which she tolerated well. F/u with us in three months for repeat cortisone injections if needed.\par \par The patient is a 76 year individual with end stage arthritis of their right knee joint. Based upon the patient's continued symptoms and failure to respond to conservative treatment I have recommended a right total knee arthroplasty for this patient. A long discussion took place with the patient describing what a total joint replacement is and what the procedure would entail. A total knee arthroplasty model, similar to the implant that was used during the operation, was utilized to demonstrate and to discuss the various bearing surfaces of the implants. The hospitalization and post-operative care and rehabilitation were also discussed. The use of perioperative antibiotics and DVT prophylaxis were discussed. The risk, benefits and alternatives to a surgical intervention were discussed at length with the patient. The patient was also advised of risks related to the medical comorbidities, elevated body mass index (BMI), and smoking where applicable. We discussed how to reduce modifiable risk factors and encouraged smoking cessation were applicable.. A lengthy discussion took place to review the most common complications including but not limited to: deep vein thrombosis, pulmonary embolus, heart attack, stroke, infection, wound breakdown, numbness, damage to nerves, tendon, muscles, arteries or other blood vessels, death and other possible complications from anesthesia. The patient was told that we will take steps to minimize these risks by using sterile technique, antibiotics and DVT prophylaxis when appropriate and follow the patient postoperatively in the office setting to monitor progress. The possibility of recurrent pain, no improvement in pain and actual worsening of pain were also discussed with the patient.\par The discharge plan of care focused on the patient going home following surgery. The patient was encouraged to make the necessary arrangements to have someone stay with them when they are discharged home. Following discharge, a home care nurse was to the patient. The home care nurse would open the patient’s home care case and request home physical therapy services. Home physical therapy was to commence following discharge provided it was appropriate and covered by the health insurance benefit plan. \par The benefits of surgery were discussed with the patient including the potential for improving her current clinical condition through operative intervention. Alternatives to surgical intervention including continued conservative management were also discussed in detail. All questions were answered to the satisfaction of the patient. The treatment plan of care, as well as a model of a total knee arthroplasty equivalent to the one that will be used for their total joint replacement, was shared with the patient. The patient agreed to the plan of care as well as the use of implants in their total joint replacement.

## 2021-09-01 NOTE — REASON FOR VISIT
[Initial Visit] : an initial visit for [Other: ____] : [unfilled] [Follow-Up Visit] : a follow-up visit for [FreeTextEntry2] : Right Knee Pain

## 2021-09-01 NOTE — PROCEDURE
[de-identified] : I injected the patient's b/l knee today with cortisone.\par \par I discussed at length with the patient the planned steroid and lidocaine injection. The risks, benefits, convalescence and alternatives were reviewed. The possible side effects discussed included but were not limited to: pain, swelling, heat, bleeding, and redness. Symptoms are generally mild but if they are extensive then contact the office. Giving pain relievers by mouth such as NSAIDs or Tylenol can generally treat the reactions to steroid and lidocaine. Rare cases of infection have been noted. Rash, hives and itching may occur post injection. If you have muscle pain or cramps, flushing and or swelling of the face, rapid heart beat, nausea, dizziness, fever, chills, headache, difficulty breathing, swelling in the arms or legs, or have a prickly feeling of your skin, contact a health care provider immediately. Following this discussion, the knee was prepped with Alcohol and under sterile condition the 80 mg Depo-Medrol and 6 cc Lidocaine injection was performed with a 20 gauge needle through a superolateral injection site. The needle was introduced into the joint, aspiration was performed to ensure intra-articular placement and the medication was injected. Upon withdrawal of the needle the site was cleaned with alcohol and a band aid applied. The patient tolerated the injection well and there were no adverse effects. Post injection instructions included no strenuous activity for 24 hours, cryotherapy and if there are any adverse effects to contact the office.

## 2021-09-01 NOTE — END OF VISIT
[FreeTextEntry3] : I, Kvng Conte, acted solely as a scribe for Dr. Yury Steven on this date 09/01/2021.

## 2021-09-01 NOTE — PHYSICAL EXAM
[LE] : Sensory: Intact in bilateral lower extremities [ALL] : dorsalis pedis, posterior tibial, femoral, popliteal, and radial 2+ and symmetric bilaterally [Normal] : Alert and in no acute distress [Poor Appearance] : well-appearing [de-identified] : GENERAL APPEARANCE: Well nourished and hydrated, pleasant, alert, and oriented x 3. Appears their stated age. \par HEENT: Normocephalic, extraocular eye motion intact. Nasal septum midline. Oral cavity clear. External auditory canal clear. \par RESPIRATORY: Breath sounds clear and audible in all lobes. No wheezing, No accessory muscle use.\par CARDIOVASCULAR: No apparent abnormalities. No lower leg edema. No varicosities. Pedal pulses are palpable.\par NEUROLOGIC: Sensation is normal, no muscle weakness in the upper or lower extremities.\par DERMATOLOGIC: No apparent skin lesions, moist, warm, no rash.\par SPINE: Cervical spine appears normal and moves freely; thoracic spine appears normal and moves freely; lumbosacral spine appears normal and moves freely, normal, nontender.\par MUSCULOSKELETAL: Hands, wrists, and elbows are normal and move freely, shoulders are normal and move freely.  [de-identified] : Right knee exam shows medial joint line tenderness, no effusion, ROM 10-90 degrees. \par Left knee exam shows medial joint line tenderness, no effusion [de-identified] : 3V xray of the left knee done in the office today and reviewed by Dr. Yury Steven demonstrates near bone on bone medial compartmental osteoarthritis

## 2021-09-01 NOTE — HISTORY OF PRESENT ILLNESS
[Pain Location] : pain [Worsening] : worsening [7] : a current pain level of 7/10 [3] : a minimum pain level of 3/10 [8] : a maximum pain level of 8/10 [Standing] : standing [Walking] : worsened by walking [Ice] : relieved by ice [Rest] : relieved by rest [de-identified] : 75 y/o F presents with b/l knee pain, but notes that her right knee pain is worse than the left knee pain. She has a known hx of bone on bone medial compartmental osteoarthritis of the right knee. The patient has received injections including PRP injections from Dr. Stephan Chapa and bilateral knee cortisone injection at her last appointment. Her pain is worsened with walking and standing. A lung nodule which was surgically removed 3 months ago. [de-identified] : standing, stairs

## 2021-09-14 ENCOUNTER — APPOINTMENT (OUTPATIENT)
Dept: PULMONOLOGY | Facility: CLINIC | Age: 76
End: 2021-09-14

## 2021-09-21 ENCOUNTER — APPOINTMENT (OUTPATIENT)
Dept: ORTHOPEDIC SURGERY | Facility: CLINIC | Age: 76
End: 2021-09-21

## 2021-10-20 ENCOUNTER — APPOINTMENT (OUTPATIENT)
Dept: ORTHOPEDIC SURGERY | Facility: CLINIC | Age: 76
End: 2021-10-20

## 2021-12-15 ENCOUNTER — APPOINTMENT (OUTPATIENT)
Dept: ORTHOPEDIC SURGERY | Facility: CLINIC | Age: 76
End: 2021-12-15
Payer: MEDICARE

## 2021-12-15 VITALS
SYSTOLIC BLOOD PRESSURE: 117 MMHG | HEIGHT: 62 IN | HEART RATE: 83 BPM | BODY MASS INDEX: 31.47 KG/M2 | WEIGHT: 171 LBS | DIASTOLIC BLOOD PRESSURE: 74 MMHG

## 2021-12-15 PROCEDURE — 99214 OFFICE O/P EST MOD 30 MIN: CPT | Mod: 25

## 2021-12-15 PROCEDURE — 20610 DRAIN/INJ JOINT/BURSA W/O US: CPT | Mod: 50

## 2021-12-15 NOTE — DISCUSSION/SUMMARY
[Medication Risks Reviewed] : Medication risks reviewed [Surgical risks reviewed] : Surgical risks reviewed [de-identified] : 75 y/o F with bone on bone medial compartmental osteoarthritis of the right knee and near bone on bone medial compartmental osteoarthritis of the left knee. Conservative therapy and surgical options discussed in detail with the patient. Based on imaging, she is a candidate for a right TKA and a future candidate for a left TKA. The patient is still recovering from thoracic surgery for a lung nodule, so she is not interested in pursuing surgery. She is still getting some relief with cortisone injections and opted for repeat bilateral knee cortisone injections today which she tolerated well. F/u with us in three months for repeat cortisone injections if needed. \par \par The patient is a 76 year individual with end stage arthritis of their right knee joint. Based upon the patient's continued symptoms and failure to respond to conservative treatment I have recommended a right total knee arthroplasty for this patient. A long discussion took place with the patient describing what a total joint replacement is and what the procedure would entail. A total knee arthroplasty model, similar to the implant that was used during the operation, was utilized to demonstrate and to discuss the various bearing surfaces of the implants. The hospitalization and post-operative care and rehabilitation were also discussed. The use of perioperative antibiotics and DVT prophylaxis were discussed. The risk, benefits and alternatives to a surgical intervention were discussed at length with the patient. The patient was also advised of risks related to the medical comorbidities, elevated body mass index (BMI), and smoking where applicable. We discussed how to reduce modifiable risk factors and encouraged smoking cessation were applicable.. A lengthy discussion took place to review the most common complications including but not limited to: deep vein thrombosis, pulmonary embolus, heart attack, stroke, infection, wound breakdown, numbness, damage to nerves, tendon, muscles, arteries or other blood vessels, death and other possible complications from anesthesia. The patient was told that we will take steps to minimize these risks by using sterile technique, antibiotics and DVT prophylaxis when appropriate and follow the patient postoperatively in the office setting to monitor progress. The possibility of recurrent pain, no improvement in pain and actual worsening of pain were also discussed with the patient.\par The discharge plan of care focused on the patient going home following surgery. The patient was encouraged to make the necessary arrangements to have someone stay with them when they are discharged home. Following discharge, a home care nurse was to the patient. The home care nurse would open the patient’s home care case and request home physical therapy services. Home physical therapy was to commence following discharge provided it was appropriate and covered by the health insurance benefit plan. \par The benefits of surgery were discussed with the patient including the potential for improving her current clinical condition through operative intervention. Alternatives to surgical intervention including continued conservative management were also discussed in detail. All questions were answered to the satisfaction of the patient. The treatment plan of care, as well as a model of a total knee arthroplasty equivalent to the one that will be used for their total joint replacement, was shared with the patient. The patient agreed to the plan of care as well as the use of implants in their total joint replacement.

## 2021-12-15 NOTE — PROCEDURE
[de-identified] : I injected the patient's b/l knee today with cortisone.\par \par I discussed at length with the patient the planned steroid and lidocaine injection. The risks, benefits, convalescence and alternatives were reviewed. The possible side effects discussed included but were not limited to: pain, swelling, heat, bleeding, and redness. Symptoms are generally mild but if they are extensive then contact the office. Giving pain relievers by mouth such as NSAIDs or Tylenol can generally treat the reactions to steroid and lidocaine. Rare cases of infection have been noted. Rash, hives and itching may occur post injection. If you have muscle pain or cramps, flushing and or swelling of the face, rapid heart beat, nausea, dizziness, fever, chills, headache, difficulty breathing, swelling in the arms or legs, or have a prickly feeling of your skin, contact a health care provider immediately. Following this discussion, the knee was prepped with Alcohol and under sterile condition the 80 mg Depo-Medrol and 6 cc Lidocaine injection was performed with a 20 gauge needle through a superolateral injection site. The needle was introduced into the joint, aspiration was performed to ensure intra-articular placement and the medication was injected. Upon withdrawal of the needle the site was cleaned with alcohol and a band aid applied. The patient tolerated the injection well and there were no adverse effects. Post injection instructions included no strenuous activity for 24 hours, cryotherapy and if there are any adverse effects to contact the office.

## 2021-12-15 NOTE — PHYSICAL EXAM
[LE] : Sensory: Intact in bilateral lower extremities [ALL] : dorsalis pedis, posterior tibial, femoral, popliteal, and radial 2+ and symmetric bilaterally [Normal] : Alert and in no acute distress [Poor Appearance] : well-appearing [de-identified] : GENERAL APPEARANCE: Well nourished and hydrated, pleasant, alert, and oriented x 3. Appears their stated age. \par HEENT: Normocephalic, extraocular eye motion intact. Nasal septum midline. Oral cavity clear. External auditory canal clear. \par RESPIRATORY: Breath sounds clear and audible in all lobes. No wheezing, No accessory muscle use.\par CARDIOVASCULAR: No apparent abnormalities. No lower leg edema. No varicosities. Pedal pulses are palpable.\par NEUROLOGIC: Sensation is normal, no muscle weakness in the upper or lower extremities.\par DERMATOLOGIC: No apparent skin lesions, moist, warm, no rash.\par SPINE: Cervical spine appears normal and moves freely; thoracic spine appears normal and moves freely; lumbosacral spine appears normal and moves freely, normal, nontender.\par MUSCULOSKELETAL: Hands, wrists, and elbows are normal and move freely, shoulders are normal and move freely.  [de-identified] : Right knee exam shows medial joint line tenderness, no effusion, ROM 10-90 degrees. \par Left knee exam shows medial joint line tenderness, no effusion

## 2021-12-15 NOTE — HISTORY OF PRESENT ILLNESS
[Pain Location] : pain [Worsening] : worsening [7] : a current pain level of 7/10 [3] : a minimum pain level of 3/10 [8] : a maximum pain level of 8/10 [Standing] : standing [Walking] : worsened by walking [Ice] : relieved by ice [Rest] : relieved by rest [de-identified] : 77 y/o F presents with b/l knee pain. She has more pain in the right knee than in the left knee. She has a known hx of bone on bone medial compartmental osteoarthritis of the right knee and near bone on bone medial compartmental osteoarthritis of the left knee. She has received PRP injections in the past from Dr. Stephan Chapa. At her last appointment, she received bilateral knee cortisone injections which provided relief until about 3 weeks ago. She has pain with walking and standing. A lung nodule which was surgically removed 3 months ago. [de-identified] : standing, stairs

## 2021-12-15 NOTE — END OF VISIT
[FreeTextEntry3] : I, Kvng Conte, acted solely as a scribe for Dr. Yury Steven on this date 12/15/2021.

## 2022-01-04 ENCOUNTER — APPOINTMENT (OUTPATIENT)
Dept: PULMONOLOGY | Facility: CLINIC | Age: 77
End: 2022-01-04
Payer: MEDICARE

## 2022-01-04 VITALS
DIASTOLIC BLOOD PRESSURE: 74 MMHG | WEIGHT: 175 LBS | SYSTOLIC BLOOD PRESSURE: 140 MMHG | BODY MASS INDEX: 31.01 KG/M2 | HEIGHT: 63 IN | RESPIRATION RATE: 16 BRPM | HEART RATE: 109 BPM | OXYGEN SATURATION: 98 %

## 2022-01-04 PROCEDURE — 99215 OFFICE O/P EST HI 40 MIN: CPT

## 2022-01-04 NOTE — REVIEW OF SYSTEMS
[SOB on Exertion] : sob on exertion [Negative] : Genitourinary [Postnasal Drip] : postnasal drip [Back Pain] : back pain [Depression] : depression [Fever] : no fever [Chills] : no chills [Nasal Congestion] : no nasal congestion [Sinus Problems] : no sinus problems [Cough] : no cough [Chest Tightness] : no chest tightness [Sputum] : no sputum [Dyspnea] : no dyspnea [Pleuritic Pain] : no pleuritic pain [Wheezing] : no wheezing [Chest Discomfort] : no chest discomfort [Edema] : no edema [Palpitations] : no palpitations [Syncope] : no syncope [Seasonal Allergies] : no seasonal allergies [GERD] : no gerd [Abdominal Pain] : no abdominal pain [Nausea] : no nausea [Vomiting] : no vomiting [Diarrhea] : no diarrhea [Constipation] : no constipation [Anemia] : no anemia [Headache] : no headache [Seizures] : no seizures [Dizziness] : no dizziness [Numbness] : no numbness [Paralysis] : no paralysis [Confusion] : no confusion [Anxiety] : no anxiety [Diabetes] : no diabetes [Thyroid Problem] : no thyroid problem

## 2022-01-04 NOTE — CONSULT LETTER
[Dear  ___] : Dear  [unfilled], [Consult Letter:] : I had the pleasure of evaluating your patient, [unfilled]. [Please see my note below.] : Please see my note below. [Consult Closing:] : Thank you very much for allowing me to participate in the care of this patient.  If you have any questions, please do not hesitate to contact me. [Sincerely,] : Sincerely, [FreeTextEntry3] : Vladimir Comer MD, FCCP, D. ABSM\par Pulmonary and Sleep Medicine\par [facility] at [place]

## 2022-01-04 NOTE — DISCUSSION/SUMMARY
[FreeTextEntry1] : \par #1. PFTs performed previously were essentially normal though subsequent spirometry was reduced post ARIANA lobectoy\par #2. The patient does not appear to require chronic BD therapy at this time\par #3. SOBOE is likely related to weight or deconditioning given normal PFTs\par #4. Diet and exercise for weight loss \par #5. CXR to evaluate discomfort\par #6. ENT f/u for hoarseness\par #7. Cardiology f/u \par #8. S/p one Covid vaccine\par #9. F/u in one month with CXR and PFTs\par #10. Reviewed risks of exposure and symptoms of Covid-19 virus, including how the virus is spread and precautions to avoid jered virus.\par \par Patient's questions were answered and patient appears to understand these recommendations

## 2022-01-04 NOTE — HISTORY OF PRESENT ILLNESS
[Former] : former [TextBox_4] : Patient with complaints of exercise intolerance post op since resection of lung cancer in 5/2021.\par Some wheeze on awakening but dissipates\par Chest wall tenderness since surgery.\par Pt reports heaviness in chest over the past 2 weeks. She was seen by PCP and was given nebulizer therapy and abx without significant change.\par C/o hoarseness but follows with ENT. \par Pt on Montelukast nightly for ? allergies.\par Pt seen by oncology but no further therapy appears to be required. [On ___] : performed on [unfilled] [Indication ___] : for an indication of [unfilled] [FreeTextEntry9] : Chest CT [FreeTextEntry8] : s/p ARIANA lobectomy

## 2022-01-04 NOTE — PROCEDURE
[FreeTextEntry1] : PFTs 12/10/21 - normal\par Spirometry 7/29/21 - normal but reduced from previous due to ARIANA lobectomy\par

## 2022-01-13 ENCOUNTER — APPOINTMENT (OUTPATIENT)
Dept: PULMONOLOGY | Facility: CLINIC | Age: 77
End: 2022-01-13

## 2022-02-01 ENCOUNTER — APPOINTMENT (OUTPATIENT)
Dept: SURGERY | Facility: CLINIC | Age: 77
End: 2022-02-01
Payer: MEDICARE

## 2022-02-01 VITALS
SYSTOLIC BLOOD PRESSURE: 161 MMHG | DIASTOLIC BLOOD PRESSURE: 70 MMHG | BODY MASS INDEX: 31.01 KG/M2 | HEART RATE: 97 BPM | WEIGHT: 175 LBS | OXYGEN SATURATION: 98 % | HEIGHT: 63 IN | TEMPERATURE: 98 F

## 2022-02-01 PROCEDURE — 99205 OFFICE O/P NEW HI 60 MIN: CPT

## 2022-02-02 RX ORDER — CANDESARTAN CILEXETIL AND HYDROCHLOROTHIAZIDE 32; 25 MG/1; MG/1
32-25 TABLET ORAL
Qty: 90 | Refills: 0 | Status: ACTIVE | COMMUNITY
Start: 2022-01-12

## 2022-02-02 NOTE — PAST MEDICAL HISTORY
[Postmenopausal] : The patient is postmenopausal [Menopause Age____] : age at menopause was [unfilled] [Total Preg ___] : G[unfilled] [Live Births ___] : P[unfilled]  [Age At Live Birth ___] : Age at live birth: [unfilled]

## 2022-02-09 ENCOUNTER — OUTPATIENT (OUTPATIENT)
Dept: OUTPATIENT SERVICES | Facility: HOSPITAL | Age: 77
LOS: 1 days | End: 2022-02-09
Payer: MEDICARE

## 2022-02-09 ENCOUNTER — APPOINTMENT (OUTPATIENT)
Dept: MRI IMAGING | Facility: CLINIC | Age: 77
End: 2022-02-09
Payer: MEDICARE

## 2022-02-09 DIAGNOSIS — Z98.890 OTHER SPECIFIED POSTPROCEDURAL STATES: Chronic | ICD-10-CM

## 2022-02-09 DIAGNOSIS — Z00.8 ENCOUNTER FOR OTHER GENERAL EXAMINATION: ICD-10-CM

## 2022-02-09 PROCEDURE — G1004: CPT

## 2022-02-09 PROCEDURE — A9585: CPT

## 2022-02-09 PROCEDURE — C8908: CPT | Mod: MG

## 2022-02-09 PROCEDURE — C8937: CPT

## 2022-02-09 PROCEDURE — 77049 MRI BREAST C-+ W/CAD BI: CPT | Mod: 26,MG

## 2022-02-10 ENCOUNTER — OUTPATIENT (OUTPATIENT)
Dept: OUTPATIENT SERVICES | Facility: HOSPITAL | Age: 77
LOS: 1 days | Discharge: HOME | End: 2022-02-10

## 2022-02-10 ENCOUNTER — RESULT REVIEW (OUTPATIENT)
Age: 77
End: 2022-02-10

## 2022-02-10 DIAGNOSIS — Z98.890 OTHER SPECIFIED POSTPROCEDURAL STATES: Chronic | ICD-10-CM

## 2022-02-10 DIAGNOSIS — C34.90 MALIGNANT NEOPLASM OF UNSPECIFIED PART OF UNSPECIFIED BRONCHUS OR LUNG: ICD-10-CM

## 2022-02-10 LAB
SARS-COV-2 N GENE NPH QL NAA+PROBE: NOT DETECTED
SURGICAL PATHOLOGY STUDY: SIGNIFICANT CHANGE UP

## 2022-02-11 ENCOUNTER — APPOINTMENT (OUTPATIENT)
Dept: PULMONOLOGY | Facility: CLINIC | Age: 77
End: 2022-02-11
Payer: MEDICARE

## 2022-02-11 VITALS
RESPIRATION RATE: 16 BRPM | HEART RATE: 71 BPM | DIASTOLIC BLOOD PRESSURE: 72 MMHG | SYSTOLIC BLOOD PRESSURE: 138 MMHG | OXYGEN SATURATION: 96 %

## 2022-02-11 PROCEDURE — 99214 OFFICE O/P EST MOD 30 MIN: CPT | Mod: 25

## 2022-02-11 PROCEDURE — 94010 BREATHING CAPACITY TEST: CPT

## 2022-02-11 NOTE — DISCUSSION/SUMMARY
[FreeTextEntry1] : \par #1. PFTs performed previously were essentially normal though subsequent spirometry was reduced post ARIANA lobectomy for adenoCa and now remains stable; f/u PFTs in 6 months\par #2. The patient does not appear to require chronic BD therapy at this time as pt does not have obstruction on spirometry\par #3. SOBOE is likely related to weight or deconditioning given normal PFTs and limited ambulation due to knee pain\par #4. Diet and exercise for weight loss \par #5. Oncology f/u for recently dx'd breast cancer\par #6. ENT f/u for hoarseness\par #7. Cardiology f/u \par #8. S/p J&J Covid vaccine; refusing other vaccines\par #9. Chest CT with small effusion and shift to right but improvement in aeration and no obvious parenchymal lung disease otherwise\par #10. F/u in 3 months with repeat CT\par #11. Reviewed risks of exposure and symptoms of Covid-19 virus, including how the virus is spread and precautions to avoid jered virus.\par \par The patient expressed understanding and agreement with the above recommendations/plan and accepts responsibility to be compliant with recommended testing, therapies, and f/u visits.\par All relevant questions and concerns were addressed.

## 2022-02-11 NOTE — PROCEDURE
[FreeTextEntry1] : PFTs 12/10/21 - normal\par Spirometry 7/29/21 - normal but reduced from previous due to ARIANA lobectomy\par Spirometry 2/11/22 - near normal and unchanged from previous\par

## 2022-02-11 NOTE — CONSULT LETTER
[Dear  ___] : Dear  [unfilled], [Consult Letter:] : I had the pleasure of evaluating your patient, [unfilled]. [Please see my note below.] : Please see my note below. [Consult Closing:] : Thank you very much for allowing me to participate in the care of this patient.  If you have any questions, please do not hesitate to contact me. [Sincerely,] : Sincerely, [FreeTextEntry3] : Vladimir Comer MD, FCCP, D. ABSM\par Pulmonary and Sleep Medicine\par [facility] at [place]

## 2022-02-11 NOTE — REVIEW OF SYSTEMS
[Postnasal Drip] : postnasal drip [SOB on Exertion] : sob on exertion [Back Pain] : back pain [Depression] : depression [Negative] : Genitourinary [Fever] : no fever [Chills] : no chills [Nasal Congestion] : no nasal congestion [Sinus Problems] : no sinus problems [Cough] : no cough [Chest Tightness] : no chest tightness [Sputum] : no sputum [Dyspnea] : no dyspnea [Pleuritic Pain] : no pleuritic pain [Wheezing] : no wheezing [Chest Discomfort] : no chest discomfort [Edema] : no edema [Palpitations] : no palpitations [Syncope] : no syncope [Seasonal Allergies] : no seasonal allergies [GERD] : no gerd [Abdominal Pain] : no abdominal pain [Nausea] : no nausea [Vomiting] : no vomiting [Diarrhea] : no diarrhea [Constipation] : no constipation [Anemia] : no anemia [Headache] : no headache [Seizures] : no seizures [Dizziness] : no dizziness [Numbness] : no numbness [Paralysis] : no paralysis [Confusion] : no confusion [Anxiety] : no anxiety [Diabetes] : no diabetes [Thyroid Problem] : no thyroid problem

## 2022-02-11 NOTE — HISTORY OF PRESENT ILLNESS
[Former] : former [On ___] : performed on [unfilled] [Indication ___] : for an indication of [unfilled] [TextBox_4] : Patient with complaints of exercise intolerance post op since resection of lung cancer in 5/2021.\par Some wheeze on awakening but dissipates\par Chest wall tenderness since surgery.\par Pt reports heaviness in chest over the past 2 weeks. She was seen by PCP and was given nebulizer therapy and abx without significant change.\par C/o hoarseness but follows with ENT. \par Pt on Montelukast nightly for ? allergies.\par Pt reports Covid infection in 12/2020 with H/a, fevers, and loss of taste. She did not require therapy and symptoms resolved.\par Pt seen by oncology but no further therapy appears to be required for her lung cancer.\par She is now following with oncology for her recently dx'd breast cancer. [TextBox_11] : 1.5 [TextBox_13] : 25 [YearQuit] : 1995 [FreeTextEntry9] : Chest CT [FreeTextEntry8] : s/p ARIANA lobectomy

## 2022-02-15 ENCOUNTER — RESULT REVIEW (OUTPATIENT)
Age: 77
End: 2022-02-15

## 2022-02-15 ENCOUNTER — APPOINTMENT (OUTPATIENT)
Dept: SURGERY | Facility: CLINIC | Age: 77
End: 2022-02-15
Payer: MEDICARE

## 2022-02-15 ENCOUNTER — APPOINTMENT (OUTPATIENT)
Dept: RADIATION ONCOLOGY | Facility: CLINIC | Age: 77
End: 2022-02-15
Payer: MEDICARE

## 2022-02-15 VITALS
OXYGEN SATURATION: 97 % | RESPIRATION RATE: 16 BRPM | BODY MASS INDEX: 31.36 KG/M2 | DIASTOLIC BLOOD PRESSURE: 71 MMHG | SYSTOLIC BLOOD PRESSURE: 133 MMHG | HEIGHT: 63 IN | WEIGHT: 177 LBS | HEART RATE: 79 BPM

## 2022-02-15 VITALS
BODY MASS INDEX: 31.36 KG/M2 | WEIGHT: 177 LBS | TEMPERATURE: 97.2 F | SYSTOLIC BLOOD PRESSURE: 137 MMHG | OXYGEN SATURATION: 98 % | HEIGHT: 63 IN | DIASTOLIC BLOOD PRESSURE: 74 MMHG | HEART RATE: 83 BPM

## 2022-02-15 DIAGNOSIS — Z80.1 FAMILY HISTORY OF MALIGNANT NEOPLASM OF TRACHEA, BRONCHUS AND LUNG: ICD-10-CM

## 2022-02-15 PROCEDURE — 99204 OFFICE O/P NEW MOD 45 MIN: CPT

## 2022-02-15 PROCEDURE — 99214 OFFICE O/P EST MOD 30 MIN: CPT

## 2022-02-15 NOTE — ASSESSMENT
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  She is leaning towards undergoing breast conservation.  Recommendation for:\par - MR breast\par -  Consult with radiation oncology\par -  Consult with medical oncology\par -  Consult with plastics\par -  Follow up genetic testing\par -  Follow up in 2 weeks\par

## 2022-02-15 NOTE — HISTORY OF PRESENT ILLNESS
[FreeTextEntry1] : Re: Newly diagnosed right breast IDC grade 1 ER  OH 41-50 Wic2afl negative\par \par I had the pleasure of seeing GRACIE MACK  in the office today for a new breast evaluation.  \par \par Gracie was diagnosed with right 11:00 2 cmfn IDC with DCIS grade 1 ER % [place] 41-50% Her2 negative ki67 10% at age 76.  She had surgery LL lobectomy with Dr Garcia in May 2021 for Lung Cancer.  She underwent imaging demsotratinig a bilateral asymmetries with recommendation of targeted imaging.  Left breast asymmetry was thought to be post surgical in nature and recommended for 6 month followup US.  The right asymmetry correlated with a 5 mm mass with recommendation of biopsy.  Biopsy was performed on 1/12/2022 demonstrating right 11:00 2 cmfn IDC with DCIS grade 1 ER % [place] 41-50% Her2 negative ki67 10%.\par \par 11/29/2021  Bilateral screening mammogram \par Impression:  right focal asymmetry with quiered archtectural distortion in the upper outer right breast.  There is an asymmetry in the upper left breast on MLO view.\par \par 12/27/2022  Bilateral diagnostic mammogram/bilateral breast ultrasound \par Impression:  A 5 mm hypoechoic mass with associated mild distortion in the right breast at 11:00,, 2 cm from the nipple, with mammographic correlate.  Ultrasound guided biopsy with post biopsy mammogram is recommended. \par Probably benign asymmetry in the upper left breast, far posteriorly, most likely related to post surgical changes from prior left lung surgery.  Six month followup diagnostic left breast mammogram is recommended.  BIRADS 4\par \par 1/12/2022 Pathology\par Right breast 11:00, N2 ultrasound guided core biopsy:  Invasive ductal carcinoma, well differentiated.  Grade 1.  DCIS low grade.  ER % OH 41-50% Her2 negative\par \par We reviewed and discussed current diagnosis of right breast cancer.  She understands that invasive breast cancer treatment options include surgery, radiation and/or chemotherapy/hormonal therapy.  \par \par We discussed surgical options including breast conservation with right wide lumpectomy and sentinel lymph node biopsy possible axillary dissection and the need for a tracer that would be injected into the breast; we discussed the possible options for the tracer of blue dye and/or nuclear medicine radiotracer, or another form such as magtrace to identify the sentinel nodes in a procedure termed "mapping".  We also discussed the option of right mastectomy with sentinel lymph node biopsy possible axillary dissection and the need for tracer to perform "mapping".  We discussed the rate of lymphedema with sentinel lymph node biopsy of approximately 7-10 % and 25-30% with axillary dissection.  We also discussed that in less than 2% of cases, mapping fails, requiring axillary dissection at the time of surgery. Furthermore, we discussed Z11 protocol for any individual undergoing breast conservation with a hormone positive tumor <5cm, sentinel lymph node biopsy will be performed and findings will be reported on final pathology to avoid reported micrometastasis, or isolated tumor cells as a positive node intra-operatively that would not necessitate completion axillary dissection.\par \par In terms of the differences between breast conservation or mastectomy, we reviewed that overall survival is equivalent, and locoregional recurrence is higher with breast conservation.  She understands that since locoregional recurrence is higher with breast conservation, radiation is recommended as standard of care for every patient <70 years old.  We discussed radiation as high energy directed toward the breast, generally Mon-Fri for 6.5 weeks unless a different duration is discussed with Radiation Oncology. We discussed risks v benefits of radiation and usually benign able to omit radiation with mastectomy unless in such cases as inflammatory breast cancer, positive margins after mastectomy, tumor >4cm. A separate consultation has been recommended with our multidisciplinary radiation partners to discuss further.\par \par We discussed the technical aspects of each surgical procedure in addition to technical aspects of radiation.  We reviewed risks involved with radiation including but not limited to infection, hyperpigmentation, angiosarcoma, radiation induced carditis/cardiomyopathy, and injury to the lung.  We also discussed breast lymphedema and radiation induced changes of the skin/tissue.\par \par We also discussed the difference between the biology of cancer, and reviewed the particular biology identified on pathology and the significance in terms of treatment. We discussed Oncotype/recurrence testing and need for chemotherapy for hormone positive cancers and the timing of systemic treatment prior to radiation treatment. \par \par \par We also discussed options of reconstruction.  Plastic Surgery consultation is recommended, in addition to consult with radiation oncology. \par \par We offered assistance in scheduling all multi-disciplinary appointments and imaging. We also assist in scheduling all appointments needed for medical clearance and presurgical testing. \par \par We discussed the benefit of the  who assists patients within the Cancer [facility], as well as coalition support. We have also discussed nutritional support, genetic counseling and physical therapy services we offer.\par \par \par She understands the plan and all questions were answered.\par \par \par

## 2022-02-15 NOTE — PHYSICAL EXAM
[Normocephalic] : normocephalic [Atraumatic] : atraumatic [EOMI] : extra ocular movement intact [PERRL] : pupils equal, round and reactive to light [Sclera nonicteric] : sclera nonicteric [Supple] : supple [No Supraclavicular Adenopathy] : no supraclavicular adenopathy [Examined in the supine and seated position] : examined in the supine and seated position [No dominant masses] : no dominant masses in right breast  [No dominant masses] : no dominant masses left breast [No Nipple Retraction] : no left nipple retraction [No Nipple Discharge] : no left nipple discharge [No Axillary Lymphadenopathy] : no left axillary lymphadenopathy [No Edema] : no edema [No Rashes] : no rashes [No Ulceration] : no ulceration [de-identified] : No supraclavicular or axillary adenopathy. No dominant masses, normal to palpation. Everted nipple without discharge. No skin changes.\par  [de-identified] : No supraclavicular or axillary adenopathy. No dominant masses, normal to palpation. Everted nipple without discharge. No skin changes.\par

## 2022-02-16 ENCOUNTER — APPOINTMENT (OUTPATIENT)
Dept: HEMATOLOGY ONCOLOGY | Facility: CLINIC | Age: 77
End: 2022-02-16
Payer: MEDICARE

## 2022-02-16 VITALS
SYSTOLIC BLOOD PRESSURE: 145 MMHG | WEIGHT: 177 LBS | OXYGEN SATURATION: 96 % | HEIGHT: 63 IN | BODY MASS INDEX: 31.36 KG/M2 | RESPIRATION RATE: 77 BRPM | DIASTOLIC BLOOD PRESSURE: 77 MMHG

## 2022-02-16 PROBLEM — Z80.1 FAMILY HISTORY OF LUNG CANCER: Status: ACTIVE | Noted: 2017-10-25

## 2022-02-16 PROCEDURE — 99205 OFFICE O/P NEW HI 60 MIN: CPT

## 2022-02-16 PROCEDURE — 77263 THER RADIOLOGY TX PLNG CPLX: CPT

## 2022-02-16 RX ORDER — AMLODIPINE BESYLATE AND VALSARTAN 10; 320 MG/1; MG/1
TABLET, FILM COATED ORAL
Refills: 0 | Status: DISCONTINUED | COMMUNITY
End: 2022-02-16

## 2022-02-16 NOTE — ASSESSMENT
[FreeTextEntry1] : 75 yo presents with newly diagnosed right breast IDC with DCIS grade 1 ER % [place] 41-50% HER2 negative ( 5 mm mass).  Plan for right breast raul  localized wide lumpectomy with SLNB possible AND.  \par -   Right breast raul  localized wide lumpectomy with SLNB possible AND\par -  Consult with radiation oncology\par -  Consult with medical oncology\par \par \par

## 2022-02-16 NOTE — LETTER GREETING
[Dear Doctor] : Dear Doctor, [Consult Letter:] : Your patient, [unfilled] was seen in my office today for consultation. [Please see my note below.] : Please see my note below. [FreeTextEntry2] : Sandi Pickett MD

## 2022-02-16 NOTE — PHYSICAL EXAM
[Normocephalic] : normocephalic [Atraumatic] : atraumatic [EOMI] : extra ocular movement intact [PERRL] : pupils equal, round and reactive to light [Sclera nonicteric] : sclera nonicteric [Supple] : supple [No Supraclavicular Adenopathy] : no supraclavicular adenopathy [Examined in the supine and seated position] : examined in the supine and seated position [No dominant masses] : no dominant masses in right breast  [No dominant masses] : no dominant masses left breast [No Nipple Retraction] : no left nipple retraction [No Nipple Discharge] : no left nipple discharge [No Axillary Lymphadenopathy] : no left axillary lymphadenopathy [No Edema] : no edema [No Rashes] : no rashes [No Ulceration] : no ulceration [de-identified] : No supraclavicular or axillary adenopathy. No dominant masses, normal to palpation. Everted nipple without discharge. No skin changes.\par  [de-identified] : No supraclavicular or axillary adenopathy. No dominant masses, normal to palpation. Everted nipple without discharge. No skin changes.\par

## 2022-02-16 NOTE — HISTORY OF PRESENT ILLNESS
[FreeTextEntry1] : Re: Newly diagnosed right breast IDC grade 1 ER  DE 41-50 Xfx1hnk negative\par \par I had the pleasure of seeing GRACIE MACK  in the office today to discuss final surgical decision.\par \par Gracie was diagnosed with right 11:00 2 cmfn IDC with DCIS grade 1 ER % [place] 41-50% Her2 negative ki67 10% at age 76.  She had surgery LL lobectomy with Dr Garcia in May 2021 for Lung Cancer.  She underwent imaging demsotratinig a bilateral asymmetries with recommendation of targeted imaging.  Left breast asymmetry was thought to be post surgical in nature and recommended for 6 month followup US.  The right asymmetry correlated with a 5 mm mass with recommendation of biopsy.  Biopsy was performed on 1/12/2022 demonstrating right 11:00 2 cmfn IDC with DCIS grade 1 ER % DE 41-50% Her2 negative ki67 10%.\par \par [facility] Genetic Results- No clinically significant mutation identified.  VUS BRIP 1 gene\par \par 11/29/2021  Bilateral screening mammogram \par Impression:  right focal asymmetry with quiered archtectural distortion in the upper outer right breast.  There is an asymmetry in the upper left breast on MLO view.\par \par 12/27/2022  Bilateral diagnostic mammogram/bilateral breast ultrasound \par Impression:  A 5 mm hypoechoic mass with associated mild distortion in the right breast at 11:00,, 2 cm from the nipple, with mammographic correlate.  Ultrasound guided biopsy with post biopsy mammogram is recommended. \par Probably benign asymmetry in the upper left breast, far posteriorly, most likely related to post surgical changes from prior left lung surgery.  Six month followup diagnostic left breast mammogram is recommended.  BIRADS 4\par \par 1/12/2022 Pathology\par Right breast 11:00, N2 ultrasound guided core biopsy:  Invasive ductal carcinoma, well differentiated.  Grade 1.  DCIS low grade.  ER % DE 41-50% Her2 negative\par \par 2/9/2021  MR breast\par IMPRESSION: Biopsy-proven right breast malignancy. No evidence of multifocal or multicentric disease.\par RECOMMENDATION: Surgical or oncologic management.  BI-RADS 6- Known Biopsy-Proven Malignancy\par \par We reviewed recent MR breast and she understands MR breast demonstrated no additional areas suspicious for malignancy.  She declined seeing plastics.  Plan for right breast raul  localized wide lumpectomy with SLNB possible AND.

## 2022-02-16 NOTE — HISTORY OF PRESENT ILLNESS
[de-identified] : Ms. White is a 76 year old female with newly diagnosed right breast IDC grade 1 ER % [place] 41-50% Her2 negative at age 76.\par \par Patient underwent screening mammogram on 11/29/21 demonstrating right focal asymmetry with queried architectural distortion is indeterminate and left upper asymmetry is indeterminate. Obtained diagnostic mammogram and sonogram on 12/27/21 demonstrating a 5mm hypoechoic mass with associated mild distortion in the right breast at 11:00, 2cm from the nipple with recommendation for biopsy; additional findings of probably benign asymmetry in the upper left breast, far posteriorly, most likely related to postsurgical changes from prior left lung surgery with recommendation for 6 month follow up diagnostic mammogram \par \par 1/11/2022 Underwent right breast 11:00, core biopsy- IDC, well differentiated, Denis score 5/9 (2+ 2 + 1), measuring at least 3 mm, DCIS with low \par nuclear grade with microcalcification, lymphovascular permeation by tumor not seen. ER: % NC: 41-50% Her-2:  Negative (1+) Ki67 10%\par \par 02/08/2022 [facility] Genetic testing- negative no clinically significant mutation identified. Variant of uncertain significance- BRIP1\par \par 02/09/2022 MR Breast- Biopsy-proven right breast malignancy (7 mm). No evidence of multifocal or multicentric disease.\par \par \par Planning for right breast lumpectomy with Dr. Pickett \par Recalls recent DEXA from 2021 was normal\par Has only received Hydroxychloroquine for rheumatoid arthritis. Has intermittent joint pain/stiffness currently \par Following up with pulmonologist Dr. Comer for imaging related to history of lung adenocarcinoma \par \par PMH: Left lower lobe adenocarcinoma s/p lobectomy on 5/4/21,Hypertension, Hyperlipidemia, Rheumatoid Arthritis\par PSH: Left lobectomy on 5/4/21\par SH: History of smoking, quit 30 years ago \par FH: Denies family history of cancer\par Rheumatologist- Dr. Davis \par Pulmonologist- Dr. Comer

## 2022-02-16 NOTE — REVIEW OF SYSTEMS
[Negative] : Allergic/Immunologic [FreeTextEntry6] : sleep apnea, Hx lung cancer, Hx pleural effusion [FreeTextEntry5] : hypertension, hyperlipidemia [FreeTextEntry9] : arthritis

## 2022-02-16 NOTE — ASSESSMENT
[FreeTextEntry1] : 76 year old female with newly diagnosed right breast IDC grade 1 ER % ID 41-50% Her2 negative.\par \par Today we discussed the available radiographic and pathologic data in detail. We also discussed the natural history of the disease, as well as management options. \par We discussed role of Oncotype Dx based on final surgical pathology which can ascertain low, intermediate, or high recurrence risk and help guide decisions about adjuvant chemotherapy. \par Discussed the role of endocrine therapy in reducing recurrence risk. Aromatase inhibitors side effects include but not limited to vaginal dryness, hot flashes, arthralgia/musculoskeletal pain (advised can worsen pre-existing joint pain r/t RA) and decrease in bone density- can cause osteopenia/osteoporosis. \par Alternatively discussed Tamoxifen is a SERM, and side effects can include but are not limited to hot flashes, mood changes, vaginal dryness, thromboembolic events and the risk of endometrial cancer (requires GYN evaluation every 6 months- 1 year). Endocrine therapy typically starts after RT is complete.\par \par Plan:\par Will obtain DEXA results from 2021 from rheumatologist Dr. Davis \par Follow up 3-4 weeks after surgery to review final surgical pathology to finalize treatment plan

## 2022-02-16 NOTE — DISEASE MANAGEMENT
[Clinical] : TNM Stage: c [IA] : IA [FreeTextEntry4] : invasive ductal carcinoma [TTNM] : 1b [NTNM] : 0 [MTNM] : 0 [de-identified] : [id number] [de-identified] : right breast

## 2022-02-16 NOTE — CONSULT LETTER
[Dear  ___] : Dear  [unfilled], [Consult Letter:] : I had the pleasure of evaluating your patient, [unfilled]. [Please see my note below.] : Please see my note below. [Consult Closing:] : Thank you very much for allowing me to participate in the care of this patient.  If you have any questions, please do not hesitate to contact me. [Sincerely,] : Sincerely, [FreeTextEntry3] : Clarence Boone MD\par Medical Oncology/Hematology\par [facility] [place], [facility]\par \par \par [facility] [employer] [facility]\par

## 2022-02-16 NOTE — HISTORY OF PRESENT ILLNESS
[FreeTextEntry1] : This 76 year-old female presents for radiation medicine consultation.  Ms. Severino was diagnosed with right 11:00 2 cmfn IDC with DCIS on 1/12/2022.  She underwent imaging demonstrating bilateral asymmetries with recommendation of targeted imaging. Left breast asymmetry was thought to be post surgical in nature, 6 month followup was recommended.  Right breastt asymmetry correlated with a 5 mm mass with recommendation of biopsy.  Biopsy 1/12/2022 right breast at 11:00, 2 cmfn position demonstrated  IDC with DCIS, grade 1 ER % [place] 41-50% Her2 negative.\par \par Of note, Ms. Severino underwent left lower lobectomy with Dr Garcia in May 2021 for Lung Cancer.  No adjuvant treatment was given.  She is following with Dr. Orourke for surveillance.  \par \par 11/29/2021 Bilateral screening mammogram \par Impression: right focal asymmetry with architectural distortion in the upper outer right breast. There is an asymmetry in the upper left breast on MLO view.\par \par 12/27/2022 Bilateral diagnostic mammogram/bilateral breast ultrasound \par Impression:  5 mm hypoechoic mass with associated mild distortion in the right breast at 11:00, 2 cm from the nipple, with mammographic correlate. Ultrasound guided biopsy with post biopsy mammogram is recommended. \par Probably benign asymmetry in the upper left breast, far posteriorly, most likely related to post surgical changes from prior left lung surgery.  Six month followup diagnostic left breast mammogram is recommended. BIRADS 4\par \par 1/12/2022 Pathology\par Right breast 11:00, 2 cmfn ultrasound guided core biopsy:  Invasive ductal carcinoma, well differentiated. Grade 1. DCIS low grade. ER % [place] 41-50% Her2 negative.\par

## 2022-03-10 ENCOUNTER — OUTPATIENT (OUTPATIENT)
Dept: OUTPATIENT SERVICES | Facility: HOSPITAL | Age: 77
LOS: 1 days | End: 2022-03-10
Payer: MEDICARE

## 2022-03-10 VITALS
TEMPERATURE: 97 F | RESPIRATION RATE: 18 BRPM | OXYGEN SATURATION: 100 % | HEART RATE: 72 BPM | SYSTOLIC BLOOD PRESSURE: 118 MMHG | HEIGHT: 63 IN | DIASTOLIC BLOOD PRESSURE: 70 MMHG | WEIGHT: 178.57 LBS

## 2022-03-10 DIAGNOSIS — Z90.2 ACQUIRED ABSENCE OF LUNG [PART OF]: Chronic | ICD-10-CM

## 2022-03-10 DIAGNOSIS — Z29.9 ENCOUNTER FOR PROPHYLACTIC MEASURES, UNSPECIFIED: ICD-10-CM

## 2022-03-10 DIAGNOSIS — C50.911 MALIGNANT NEOPLASM OF UNSPECIFIED SITE OF RIGHT FEMALE BREAST: ICD-10-CM

## 2022-03-10 DIAGNOSIS — Z98.890 OTHER SPECIFIED POSTPROCEDURAL STATES: Chronic | ICD-10-CM

## 2022-03-10 DIAGNOSIS — Z13.89 ENCOUNTER FOR SCREENING FOR OTHER DISORDER: ICD-10-CM

## 2022-03-10 DIAGNOSIS — Z01.818 ENCOUNTER FOR OTHER PREPROCEDURAL EXAMINATION: ICD-10-CM

## 2022-03-10 DIAGNOSIS — Z92.29 PERSONAL HISTORY OF OTHER DRUG THERAPY: ICD-10-CM

## 2022-03-10 LAB
A1C WITH ESTIMATED AVERAGE GLUCOSE RESULT: 5.6 % — SIGNIFICANT CHANGE UP (ref 4–5.6)
ALBUMIN SERPL ELPH-MCNC: 4.4 G/DL — SIGNIFICANT CHANGE UP (ref 3.3–5.2)
ALP SERPL-CCNC: 94 U/L — SIGNIFICANT CHANGE UP (ref 40–120)
ALT FLD-CCNC: 19 U/L — SIGNIFICANT CHANGE UP
ANION GAP SERPL CALC-SCNC: 11 MMOL/L — SIGNIFICANT CHANGE UP (ref 5–17)
APTT BLD: 28.5 SEC — SIGNIFICANT CHANGE UP (ref 27.5–35.5)
AST SERPL-CCNC: 25 U/L — SIGNIFICANT CHANGE UP
BASOPHILS # BLD AUTO: 0.07 K/UL — SIGNIFICANT CHANGE UP (ref 0–0.2)
BASOPHILS NFR BLD AUTO: 1 % — SIGNIFICANT CHANGE UP (ref 0–2)
BILIRUB SERPL-MCNC: 0.3 MG/DL — LOW (ref 0.4–2)
BLD GP AB SCN SERPL QL: SIGNIFICANT CHANGE UP
BUN SERPL-MCNC: 20.3 MG/DL — HIGH (ref 8–20)
CALCIUM SERPL-MCNC: 10.4 MG/DL — HIGH (ref 8.6–10.2)
CHLORIDE SERPL-SCNC: 98 MMOL/L — SIGNIFICANT CHANGE UP (ref 98–107)
CO2 SERPL-SCNC: 28 MMOL/L — SIGNIFICANT CHANGE UP (ref 22–29)
CREAT SERPL-MCNC: 0.68 MG/DL — SIGNIFICANT CHANGE UP (ref 0.5–1.3)
EGFR: 91 ML/MIN/1.73M2 — SIGNIFICANT CHANGE UP
EOSINOPHIL # BLD AUTO: 0.11 K/UL — SIGNIFICANT CHANGE UP (ref 0–0.5)
EOSINOPHIL NFR BLD AUTO: 1.6 % — SIGNIFICANT CHANGE UP (ref 0–6)
ESTIMATED AVERAGE GLUCOSE: 114 MG/DL — SIGNIFICANT CHANGE UP (ref 68–114)
GLUCOSE SERPL-MCNC: 104 MG/DL — HIGH (ref 70–99)
HCT VFR BLD CALC: 38.5 % — SIGNIFICANT CHANGE UP (ref 34.5–45)
HGB BLD-MCNC: 12.8 G/DL — SIGNIFICANT CHANGE UP (ref 11.5–15.5)
IMM GRANULOCYTES NFR BLD AUTO: 0.3 % — SIGNIFICANT CHANGE UP (ref 0–1.5)
INR BLD: 0.94 RATIO — SIGNIFICANT CHANGE UP (ref 0.88–1.16)
LYMPHOCYTES # BLD AUTO: 1.47 K/UL — SIGNIFICANT CHANGE UP (ref 1–3.3)
LYMPHOCYTES # BLD AUTO: 21.3 % — SIGNIFICANT CHANGE UP (ref 13–44)
MCHC RBC-ENTMCNC: 31.3 PG — SIGNIFICANT CHANGE UP (ref 27–34)
MCHC RBC-ENTMCNC: 33.2 GM/DL — SIGNIFICANT CHANGE UP (ref 32–36)
MCV RBC AUTO: 94.1 FL — SIGNIFICANT CHANGE UP (ref 80–100)
MONOCYTES # BLD AUTO: 0.53 K/UL — SIGNIFICANT CHANGE UP (ref 0–0.9)
MONOCYTES NFR BLD AUTO: 7.7 % — SIGNIFICANT CHANGE UP (ref 2–14)
NEUTROPHILS # BLD AUTO: 4.69 K/UL — SIGNIFICANT CHANGE UP (ref 1.8–7.4)
NEUTROPHILS NFR BLD AUTO: 68.1 % — SIGNIFICANT CHANGE UP (ref 43–77)
PLATELET # BLD AUTO: 173 K/UL — SIGNIFICANT CHANGE UP (ref 150–400)
POTASSIUM SERPL-MCNC: 4.2 MMOL/L — SIGNIFICANT CHANGE UP (ref 3.5–5.3)
POTASSIUM SERPL-SCNC: 4.2 MMOL/L — SIGNIFICANT CHANGE UP (ref 3.5–5.3)
PROT SERPL-MCNC: 7.2 G/DL — SIGNIFICANT CHANGE UP (ref 6.6–8.7)
PROTHROM AB SERPL-ACNC: 10.9 SEC — SIGNIFICANT CHANGE UP (ref 10.5–13.4)
RBC # BLD: 4.09 M/UL — SIGNIFICANT CHANGE UP (ref 3.8–5.2)
RBC # FLD: 12 % — SIGNIFICANT CHANGE UP (ref 10.3–14.5)
SODIUM SERPL-SCNC: 137 MMOL/L — SIGNIFICANT CHANGE UP (ref 135–145)
WBC # BLD: 6.89 K/UL — SIGNIFICANT CHANGE UP (ref 3.8–10.5)
WBC # FLD AUTO: 6.89 K/UL — SIGNIFICANT CHANGE UP (ref 3.8–10.5)

## 2022-03-10 PROCEDURE — 93010 ELECTROCARDIOGRAM REPORT: CPT

## 2022-03-10 PROCEDURE — G0463: CPT

## 2022-03-10 PROCEDURE — 93005 ELECTROCARDIOGRAM TRACING: CPT

## 2022-03-10 RX ORDER — AMLODIPINE BESYLATE 2.5 MG/1
1 TABLET ORAL
Qty: 0 | Refills: 0 | DISCHARGE

## 2022-03-10 RX ORDER — DICLOFENAC SODIUM 75 MG/1
0 TABLET, DELAYED RELEASE ORAL
Qty: 0 | Refills: 0 | DISCHARGE

## 2022-03-10 RX ORDER — ROSUVASTATIN CALCIUM 5 MG/1
1 TABLET ORAL
Qty: 0 | Refills: 0 | DISCHARGE

## 2022-03-10 RX ORDER — CANDESARTAN CILEXETIL AND HYDROCHLOROTHIAZIDE 32; 12.5 MG/1; MG/1
1 TABLET ORAL
Qty: 0 | Refills: 0 | DISCHARGE

## 2022-03-10 RX ORDER — ALPRAZOLAM 0.25 MG
0 TABLET ORAL
Qty: 0 | Refills: 0 | DISCHARGE

## 2022-03-10 NOTE — H&P PST ADULT - PROBLEM SELECTOR PLAN 2
CAP 7 patient High risk, SCDs ordered for day of procedure.  Surgical team to assess need for  pharmacological and mechanical measures for VTE prophylaxis

## 2022-03-10 NOTE — H&P PST ADULT - NEGATIVE ENMT SYMPTOMS
no hearing difficulty/no ear pain/no tinnitus/no vertigo/no nasal congestion/no nasal obstruction/no post-nasal discharge/no nose bleeds/no dry mouth/no throat pain/no dysphagia

## 2022-03-10 NOTE — H&P PST ADULT - HISTORY OF PRESENT ILLNESS
76 year old female with history of lung cancer (s/p lobectomy)     Denies breast pain, no discharge   Denies dyspnea, chest pain, palpitations,   Mother with history of lung cancer   76 year old female with history of lung cancer (s/p lobectomy), GERD, HLD, HTN, OA, sleep apnea  (does not use CPAP) and newly diagnosed cancer of the right breast, presents today for PST. Patient states during her regular mammogram they saw an abnormality. A biopsy was performed and confirmed breast cancer of the right breast. Denies breast pain, discharge or nipple. Denies dyspnea, chest pain or palpitations. Denies family history of breast cancer, Mother with h/o lung cancer only. Now scheduled for right breast raul  localized wide lumpectomy with sentinel lymph node biopsy possible axillary dissection on 3/25/22 with Dr. Pickett pending medical clearance.

## 2022-03-10 NOTE — H&P PST ADULT - NSICDXFAMILYHX_GEN_ALL_CORE_FT
FAMILY HISTORY:  Father  Still living? Unknown  FH: aneurysm, Age at diagnosis: Age Unknown  FH: heart disease, Age at diagnosis: Age Unknown    Mother  Still living? Yes, Estimated age: Age Unknown  Family hx of lung cancer, Age at diagnosis: Age Unknown  FH: HTN (hypertension), Age at diagnosis: Age Unknown

## 2022-03-10 NOTE — H&P PST ADULT - PROBLEM SELECTOR PLAN 1
Labs and EKG performed.  Written and verbal instructions provided.  Now scheduled for right breast raul  localized wide lumpectomy with sentinel lymph node biopsy possible axillary dissection on 3/25/22 with Dr. Pickett pending medical clearance.   Patient to have medical clearance with Dr. Bravo  Patient educated on surgical scrub, COVID testing 3/22, preadmission instructions, medical clearance and day of procedure medications, verbalizes understanding, teach back method utilized.  Patient instructed to stop ASA/Herbals or anti-inflammatory meds one week prior to surgery and discuss with PCP

## 2022-03-10 NOTE — H&P PST ADULT - NEUROLOGICAL
negative [Initial Consultation] : an initial consultation for [FreeTextEntry2] : chronic urticaria, h/o angioedema, chronic rhinitis/ itchy eyes detailed exam

## 2022-03-10 NOTE — H&P PST ADULT - ATTENDING COMMENTS
Patient with right breast cancer now undergoing right breast raul  localized wide lumpectomy with sentinel lymph node biopsy possible axillary dissection. We discussed technical aspects of surgery, risks v benefits and all questions were answered. She also understands alternative to particular procedure.

## 2022-03-10 NOTE — H&P PST ADULT - NSICDXPASTMEDICALHX_GEN_ALL_CORE_FT
PAST MEDICAL HISTORY:  GERD (gastroesophageal reflux disease)     Hyperlipidemia     Hypertension     Lung cancer     OA (osteoarthritis) bilateral knee    Solitary pulmonary nodule      PAST MEDICAL HISTORY:  GERD (gastroesophageal reflux disease)     Hyperlipidemia     Hypertension     Lung cancer     OA (osteoarthritis) bilateral knee    Obstructive sleep apnea on CPAP     Solitary pulmonary nodule

## 2022-03-10 NOTE — H&P PST ADULT - ASSESSMENT
CAPRINI SCORE    AGE RELATED RISK FACTORS                                                             [ ] Age 41-60 years                                            (1 Point)  [X ] Age: 61-74 years                                           (2 Points)                 [ ] Age= 75 years                                                (3 Points)             DISEASE RELATED RISK FACTORS                                                       [ ] Edema in the lower extremities                 (1 Point)                     [ ] Varicose veins                                               (1 Point)                                 [X ] BMI > 25 Kg/m2                                            (1 Point)                                  [ ] Serious infection (ie PNA)                            (1 Point)                     [ ] Lung disease ( COPD, Emphysema)            (1 Point)                                                                          [ ] Acute myocardial infarction                         (1 Point)                  [ ] Congestive heart failure (in the previous month)  (1 Point)         [ ] Inflammatory bowel disease                            (1 Point)                  [ ] Central venous access, PICC or Port               (2 points)       (within the last month)                                                                [ ] Stroke (in the previous month)                        (5 Points)    [X ] Previous or present malignancy                       (2 points)                                                                                                                                                         HEMATOLOGY RELATED FACTORS                                                         [ ] Prior episodes of VTE                                     (3 Points)                     [ ] Positive family history for VTE                      (3 Points)                  [ ] Prothrombin 88836 A                                     (3 Points)                     [ ] Factor V Leiden                                                (3 Points)                        [ ] Lupus anticoagulants                                      (3 Points)                                                           [ ] Anticardiolipin antibodies                              (3 Points)                                                       [ ] High homocysteine in the blood                   (3 Points)                                             [ ] Other congenital or acquired thrombophilia      (3 Points)                                                [ ] Heparin induced thrombocytopenia                  (3 Points)                                        MOBILITY RELATED FACTORS  [ ] Bed rest                                                         (1 Point)  [ ] Plaster cast                                                    (2 points)  [ ] Bed bound for more than 72 hours           (2 Points)    GENDER SPECIFIC FACTORS  [ ] Pregnancy or had a baby within the last month   (1 Point)  [ ] Post-partum < 6 weeks                                   (1 Point)  [ ] Hormonal therapy  or oral contraception   (1 Point)  [ ] History of pregnancy complications              (1 point)  [ ] Unexplained or recurrent              (1 Point)    OTHER RISK FACTORS                                           (1 Point)  [ ] BMI >40, smoking, diabetes requiring insulin, chemotherapy  blood transfusions and length of surgery over 2 hours    SURGERY RELATED RISK FACTORS  [ ]  Section within the last month     (1 Point)  [ ] Minor surgery                                                  (1 Point)  [ ] Arthroscopic surgery                                       (2 Points)  [X ] Planned major surgery lasting more            (2 Points)      than 45 minutes     [ ] Elective hip or knee joint replacement       (5 points)       surgery                                                TRAUMA RELATED RISK FACTORS  [ ] Fracture of the hip, pelvis, or leg                       (5 Points)  [ ] Spinal cord injury resulting in paralysis             (5 points)       (in the previous month)    [ ] Paralysis  (less than 1 month)                             (5 Points)  [ ] Multiple Trauma within 1 month                        (5 Points)    Total Score [   7     ]    Caprini Score 0-2: Low Risk, NO VTE prophylaxis required for most patients, encourage ambulation  Caprini Score 3-6: Moderate Risk , pharmacologic VTE prophylaxis is indicated for most patients (in the absence of contraindications)  Caprini Score Greater than or =7: High risk, pharmocologic VTE prophylaxis indicated for most patients (in the absence of contraindications)    OPIOID RISK TOOL    MERVIN EACH BOX THAT APPLIES AND ADD TOTALS AT THE END    FAMILY HISTORY OF SUBSTANCE ABUSE                 FEMALE         MALE                                                Alcohol                             [  ]1 pt          [  ]3pts                                               Illegal Durgs                     [  ]2 pts        [  ]3pts                                               Rx Drugs                           [  ]4 pts        [  ]4 pts    PERSONAL HISTORY OF SUBSTANCE ABUSE                                                                                          Alcohol                             [  ]3 pts       [  ]3 pts                                               Illegal Drugs                     [  ]4 pts        [  ]4 pts                                               Rx Drugs                           [  ]5 pts        [  ]5 pts    AGE BETWEEN 16-45 YEARS                                      [  ]1 pt         [  ]1 pt    HISTORY OF PREADOLESCENT   SEXUAL ABUSE                                                             [  ]3 pts        [  ]0pts    PSYCHOLOGICAL DISEASE                     ADD, OCD, Bipolar, Schizophrenia        [  ]2 pts         [  ]2 pts                      Depression                                               [  ]1 pt           [  ]1 pt           SCORING TOTAL   (add numbers and type here)              (**0*)                                     A score of 3 or lower indicated LOW risk for future opioid abuse  A score of 4 to 7 indicated moderate risk for future opioid abuse  A score of 8 or higher indicates a high risk for opioid abuse                                       76 year old female with history of lung cancer (s/p lobectomy), GERD, HLD, HTN, OA, sleep apnea  (does not use CPAP) and newly diagnosed cancer of the right breast, presents today for PST. Patient states during her regular mammogram they saw an abnormality. A biopsy was performed and confirmed breast cancer of the right breast. Denies breast pain, discharge or nipple. Denies dyspnea, chest pain or palpitations. Denies family history of breast cancer, Mother with h/o lung cancer only. Now scheduled for right breast raul  localized wide lumpectomy with sentinel lymph node biopsy possible axillary dissection on 3/25/22 with Dr. Pickett pending medical clearance.         CAPRINI SCORE    AGE RELATED RISK FACTORS                                                             [ ] Age 41-60 years                                            (1 Point)  [X ] Age: 61-74 years                                           (2 Points)                 [ ] Age= 75 years                                                (3 Points)             DISEASE RELATED RISK FACTORS                                                       [ ] Edema in the lower extremities                 (1 Point)                     [ ] Varicose veins                                               (1 Point)                                 [X ] BMI > 25 Kg/m2                                            (1 Point)                                  [ ] Serious infection (ie PNA)                            (1 Point)                     [ ] Lung disease ( COPD, Emphysema)            (1 Point)                                                                          [ ] Acute myocardial infarction                         (1 Point)                  [ ] Congestive heart failure (in the previous month)  (1 Point)         [ ] Inflammatory bowel disease                            (1 Point)                  [ ] Central venous access, PICC or Port               (2 points)       (within the last month)                                                                [ ] Stroke (in the previous month)                        (5 Points)    [X ] Previous or present malignancy                       (2 points)                                                                                                                                                         HEMATOLOGY RELATED FACTORS                                                         [ ] Prior episodes of VTE                                     (3 Points)                     [ ] Positive family history for VTE                      (3 Points)                  [ ] Prothrombin 53052 A                                     (3 Points)                     [ ] Factor V Leiden                                                (3 Points)                        [ ] Lupus anticoagulants                                      (3 Points)                                                           [ ] Anticardiolipin antibodies                              (3 Points)                                                       [ ] High homocysteine in the blood                   (3 Points)                                             [ ] Other congenital or acquired thrombophilia      (3 Points)                                                [ ] Heparin induced thrombocytopenia                  (3 Points)                                        MOBILITY RELATED FACTORS  [ ] Bed rest                                                         (1 Point)  [ ] Plaster cast                                                    (2 points)  [ ] Bed bound for more than 72 hours           (2 Points)    GENDER SPECIFIC FACTORS  [ ] Pregnancy or had a baby within the last month   (1 Point)  [ ] Post-partum < 6 weeks                                   (1 Point)  [ ] Hormonal therapy  or oral contraception   (1 Point)  [ ] History of pregnancy complications              (1 point)  [ ] Unexplained or recurrent              (1 Point)    OTHER RISK FACTORS                                           (1 Point)  [ ] BMI >40, smoking, diabetes requiring insulin, chemotherapy  blood transfusions and length of surgery over 2 hours    SURGERY RELATED RISK FACTORS  [ ]  Section within the last month     (1 Point)  [ ] Minor surgery                                                  (1 Point)  [ ] Arthroscopic surgery                                       (2 Points)  [X ] Planned major surgery lasting more            (2 Points)      than 45 minutes     [ ] Elective hip or knee joint replacement       (5 points)       surgery                                                TRAUMA RELATED RISK FACTORS  [ ] Fracture of the hip, pelvis, or leg                       (5 Points)  [ ] Spinal cord injury resulting in paralysis             (5 points)       (in the previous month)    [ ] Paralysis  (less than 1 month)                             (5 Points)  [ ] Multiple Trauma within 1 month                        (5 Points)    Total Score [   7     ]    Caprini Score 0-2: Low Risk, NO VTE prophylaxis required for most patients, encourage ambulation  Caprini Score 3-6: Moderate Risk , pharmacologic VTE prophylaxis is indicated for most patients (in the absence of contraindications)  Caprini Score Greater than or =7: High risk, pharmocologic VTE prophylaxis indicated for most patients (in the absence of contraindications)    OPIOID RISK TOOL    MERVIN EACH BOX THAT APPLIES AND ADD TOTALS AT THE END    FAMILY HISTORY OF SUBSTANCE ABUSE                 FEMALE         MALE                                                Alcohol                             [  ]1 pt          [  ]3pts                                               Illegal Durgs                     [  ]2 pts        [  ]3pts                                               Rx Drugs                           [  ]4 pts        [  ]4 pts    PERSONAL HISTORY OF SUBSTANCE ABUSE                                                                                          Alcohol                             [  ]3 pts       [  ]3 pts                                               Illegal Drugs                     [  ]4 pts        [  ]4 pts                                               Rx Drugs                           [  ]5 pts        [  ]5 pts    AGE BETWEEN 16-45 YEARS                                      [  ]1 pt         [  ]1 pt    HISTORY OF PREADOLESCENT   SEXUAL ABUSE                                                             [  ]3 pts        [  ]0pts    PSYCHOLOGICAL DISEASE                     ADD, OCD, Bipolar, Schizophrenia        [  ]2 pts         [  ]2 pts                      Depression                                               [  ]1 pt           [  ]1 pt           SCORING TOTAL   (add numbers and type here)              (**0*)                                     A score of 3 or lower indicated LOW risk for future opioid abuse  A score of 4 to 7 indicated moderate risk for future opioid abuse  A score of 8 or higher indicates a high risk for opioid abuse

## 2022-03-18 ENCOUNTER — APPOINTMENT (OUTPATIENT)
Dept: ORTHOPEDIC SURGERY | Facility: CLINIC | Age: 77
End: 2022-03-18
Payer: MEDICARE

## 2022-03-18 VITALS
DIASTOLIC BLOOD PRESSURE: 70 MMHG | BODY MASS INDEX: 31.36 KG/M2 | WEIGHT: 177 LBS | HEART RATE: 91 BPM | TEMPERATURE: 98.6 F | SYSTOLIC BLOOD PRESSURE: 131 MMHG | HEIGHT: 63 IN

## 2022-03-18 PROBLEM — C34.90 MALIGNANT NEOPLASM OF UNSPECIFIED PART OF UNSPECIFIED BRONCHUS OR LUNG: Chronic | Status: ACTIVE | Noted: 2022-03-10

## 2022-03-18 PROBLEM — K21.9 GASTRO-ESOPHAGEAL REFLUX DISEASE WITHOUT ESOPHAGITIS: Chronic | Status: ACTIVE | Noted: 2022-03-10

## 2022-03-18 PROBLEM — G47.33 OBSTRUCTIVE SLEEP APNEA (ADULT) (PEDIATRIC): Chronic | Status: ACTIVE | Noted: 2022-03-10

## 2022-03-18 PROCEDURE — 99213 OFFICE O/P EST LOW 20 MIN: CPT | Mod: 25

## 2022-03-18 PROCEDURE — 20610 DRAIN/INJ JOINT/BURSA W/O US: CPT | Mod: 50

## 2022-03-18 NOTE — HISTORY OF PRESENT ILLNESS
[Pain Location] : pain [5] : a current pain level of 5/10 [Walking] : worsened by walking [Knee Flexion] : worsened with knee flexion [de-identified] : 77 y/o F presents with b/l knee pain. She has more pain in the right knee than in the left knee. She has a known hx of bone on bone medial compartmental osteoarthritis of the right knee and near bone on bone medial compartmental osteoarthritis of the left knee. she has more pain with incline and steps. She has received PRP injections in the past from Dr. Stephan Chapa. At her last appointment, she received bilateral knee cortisone injections which provided relief for about 3M. She has pain with walking and standing. A lung nodule which was surgically removed 3 months ago. \par \par \par STEPHNE MACK presents with pain. She states the symptoms are worsening. Pain levels include a current pain level of 7/10, a minimum pain level of 3/10 and a maximum pain level of 8/10. \par Her symptoms occur while walking and standing. \par \par Modifying factors - worsened by walking . standing, stairs. Relieving factors include relieved by ice and relieved by rest. \par

## 2022-03-18 NOTE — PHYSICAL EXAM
[de-identified] : GENERAL APPEARANCE: Well nourished and hydrated, pleasant, alert, and oriented x 3. Appears their stated age. \par HEENT: Normocephalic, extraocular eye motion intact. Nasal septum midline. Oral cavity clear. External auditory canal clear. \par RESPIRATORY: Breath sounds clear and audible in all lobes. No wheezing, No accessory muscle use.\par CARDIOVASCULAR: No apparent abnormalities. No lower leg edema. No varicosities. Pedal pulses are palpable.\par NEUROLOGIC: Sensation is normal, no muscle weakness in the upper or lower extremities.\par DERMATOLOGIC: No apparent skin lesions, moist, warm, no rash.\par SPINE: Cervical spine appears normal and moves freely; thoracic spine appears normal and moves freely; lumbosacral spine appears normal and moves freely, normal, nontender.\par MUSCULOSKELETAL: Hands, wrists, and elbows are normal and move freely, shoulders are normal and move freely. \par Musculoskeletal:. Right knee exam shows medial joint line tenderness, no effusion, ROM 10-90 degrees. \par Left knee exam shows medial joint line tenderness, no effusion. \par 5/5 motor strength in bilateral lower extremities. Sensory: Intact in bilateral lower extremities. DTRs: Biceps, brachioradialis, triceps, patellar, ankle and plantar 2+ and symmetric bilaterally. Pulses: dorsalis pedis, posterior tibial, femoral, popliteal, and radial 2+ and symmetric bilaterally. \par Constitutional: Alert and in no acute distress, but well-appearing. \par

## 2022-03-18 NOTE — PROCEDURE
[de-identified] : I injected the patient's b/l knee today with cortisone. for o.a\par \par I discussed at length with the patient the planned steroid and lidocaine injection. The risks, benefits, convalescence and alternatives were reviewed. The possible side effects discussed included but were not limited to: pain, swelling, heat, bleeding, and redness. Symptoms are generally mild but if they are extensive then contact the office. Giving pain relievers by mouth such as NSAIDs or Tylenol can generally treat the reactions to steroid and lidocaine. Rare cases of infection have been noted. Rash, hives and itching may occur post injection. If you have muscle pain or cramps, flushing and or swelling of the face, rapid heart beat, nausea, dizziness, fever, chills, headache, difficulty breathing, swelling in the arms or legs, or have a prickly feeling of your skin, contact a health care provider immediately. Following this discussion, the knee was prepped with Alcohol and under sterile condition the 80 mg Depo-Medrol and 6 cc Lidocaine injection was performed with a 20 gauge needle through a superolateral injection site. The needle was introduced into the joint, aspiration was performed to ensure intra-articular placement and the medication was injected. Upon withdrawal of the needle the site was cleaned with alcohol and a band aid applied. The patient tolerated the injection well and there were no adverse effects. Post injection instructions included no strenuous activity for 24 hours, cryotherapy and if there are any adverse effects to contact the office. \par

## 2022-03-24 ENCOUNTER — OUTPATIENT (OUTPATIENT)
Dept: OUTPATIENT SERVICES | Facility: HOSPITAL | Age: 77
LOS: 1 days | End: 2022-03-24
Payer: MEDICARE

## 2022-03-24 ENCOUNTER — APPOINTMENT (OUTPATIENT)
Dept: MAMMOGRAPHY | Facility: CLINIC | Age: 77
End: 2022-03-24
Payer: MEDICARE

## 2022-03-24 DIAGNOSIS — Z98.890 OTHER SPECIFIED POSTPROCEDURAL STATES: Chronic | ICD-10-CM

## 2022-03-24 DIAGNOSIS — Z90.2 ACQUIRED ABSENCE OF LUNG [PART OF]: Chronic | ICD-10-CM

## 2022-03-24 DIAGNOSIS — C50.911 MALIGNANT NEOPLASM OF UNSPECIFIED SITE OF RIGHT FEMALE BREAST: ICD-10-CM

## 2022-03-24 PROCEDURE — 19285 PERQ DEV BREAST 1ST US IMAG: CPT | Mod: RT

## 2022-03-24 PROCEDURE — 19285 PERQ DEV BREAST 1ST US IMAG: CPT

## 2022-03-24 PROCEDURE — C1739: CPT

## 2022-03-29 ENCOUNTER — TRANSCRIPTION ENCOUNTER (OUTPATIENT)
Age: 77
End: 2022-03-29

## 2022-03-30 ENCOUNTER — TRANSCRIPTION ENCOUNTER (OUTPATIENT)
Age: 77
End: 2022-03-30

## 2022-03-30 ENCOUNTER — RESULT REVIEW (OUTPATIENT)
Age: 77
End: 2022-03-30

## 2022-03-30 ENCOUNTER — APPOINTMENT (OUTPATIENT)
Dept: SURGERY | Facility: HOSPITAL | Age: 77
End: 2022-03-30
Payer: MEDICARE

## 2022-03-30 ENCOUNTER — OUTPATIENT (OUTPATIENT)
Dept: OUTPATIENT SERVICES | Facility: HOSPITAL | Age: 77
LOS: 1 days | End: 2022-03-30
Payer: MEDICARE

## 2022-03-30 DIAGNOSIS — Z98.890 OTHER SPECIFIED POSTPROCEDURAL STATES: Chronic | ICD-10-CM

## 2022-03-30 DIAGNOSIS — Z90.2 ACQUIRED ABSENCE OF LUNG [PART OF]: Chronic | ICD-10-CM

## 2022-03-30 DIAGNOSIS — C50.911 MALIGNANT NEOPLASM OF UNSPECIFIED SITE OF RIGHT FEMALE BREAST: ICD-10-CM

## 2022-03-30 LAB — SURGICAL PATHOLOGY STUDY: SIGNIFICANT CHANGE UP

## 2022-03-30 PROCEDURE — 19301 PARTIAL MASTECTOMY: CPT | Mod: RT

## 2022-03-30 PROCEDURE — 88321 CONSLTJ&REPRT SLD PREP ELSWR: CPT

## 2022-03-30 PROCEDURE — 38900 IO MAP OF SENT LYMPH NODE: CPT | Mod: RT

## 2022-03-30 PROCEDURE — 38525 BIOPSY/REMOVAL LYMPH NODES: CPT | Mod: RT

## 2022-03-30 PROCEDURE — 38792 RA TRACER ID OF SENTINL NODE: CPT | Mod: RT,59

## 2022-03-30 PROCEDURE — 14001 TIS TRNFR TRUNK 10.1-30SQCM: CPT

## 2022-03-30 RX ORDER — ATORVASTATIN CALCIUM 80 MG/1
1 TABLET, FILM COATED ORAL
Qty: 0 | Refills: 0 | DISCHARGE

## 2022-03-30 RX ORDER — ASPIRIN/CALCIUM CARB/MAGNESIUM 324 MG
1 TABLET ORAL
Qty: 0 | Refills: 0 | DISCHARGE

## 2022-03-30 RX ORDER — HYDROXYCHLOROQUINE SULFATE 200 MG
1 TABLET ORAL
Qty: 0 | Refills: 0 | DISCHARGE

## 2022-03-30 RX ORDER — ALPRAZOLAM 0.25 MG
1 TABLET ORAL
Qty: 0 | Refills: 0 | DISCHARGE

## 2022-03-30 RX ORDER — CANDESARTAN CILEXETIL AND HYDROCHLOROTHIAZIDE 32; 12.5 MG/1; MG/1
0 TABLET ORAL
Qty: 0 | Refills: 0 | DISCHARGE

## 2022-03-30 RX ORDER — OMEPRAZOLE 10 MG/1
1 CAPSULE, DELAYED RELEASE ORAL
Qty: 0 | Refills: 0 | DISCHARGE

## 2022-03-30 RX ORDER — DICLOFENAC SODIUM 75 MG/1
1 TABLET, DELAYED RELEASE ORAL
Qty: 0 | Refills: 0 | DISCHARGE

## 2022-04-05 ENCOUNTER — OUTPATIENT (OUTPATIENT)
Dept: OUTPATIENT SERVICES | Facility: HOSPITAL | Age: 77
LOS: 1 days | Discharge: HOME | End: 2022-04-05

## 2022-04-05 DIAGNOSIS — Z90.2 ACQUIRED ABSENCE OF LUNG [PART OF]: Chronic | ICD-10-CM

## 2022-04-05 DIAGNOSIS — C34.90 MALIGNANT NEOPLASM OF UNSPECIFIED PART OF UNSPECIFIED BRONCHUS OR LUNG: ICD-10-CM

## 2022-04-05 DIAGNOSIS — Z98.890 OTHER SPECIFIED POSTPROCEDURAL STATES: Chronic | ICD-10-CM

## 2022-04-09 NOTE — PHYSICAL THERAPY INITIAL EVALUATION ADULT - GENERAL OBSERVATIONS, REHAB EVAL
Pt received in bed, + IV Loc, +Tele/ monitoring, son present, breathing on RA in NAD, in 6/10 left sided chest pain, agreeable to PT evaluation
no

## 2022-04-11 ENCOUNTER — APPOINTMENT (OUTPATIENT)
Dept: SURGERY | Facility: CLINIC | Age: 77
End: 2022-04-11
Payer: MEDICARE

## 2022-04-11 VITALS
BODY MASS INDEX: 31.36 KG/M2 | OXYGEN SATURATION: 98 % | WEIGHT: 177 LBS | HEART RATE: 82 BPM | DIASTOLIC BLOOD PRESSURE: 73 MMHG | SYSTOLIC BLOOD PRESSURE: 153 MMHG | TEMPERATURE: 97.6 F | HEIGHT: 63 IN

## 2022-04-11 PROCEDURE — 99024 POSTOP FOLLOW-UP VISIT: CPT

## 2022-04-13 NOTE — HISTORY OF PRESENT ILLNESS
[FreeTextEntry1] : Re: Newly diagnosed right breast IDC grade 1 ER  WI 41-50 [id number] negative\par \par I had the pleasure of seeing GRACIE MACK  in the office today for a postoperative visit.\par \par Gracie was diagnosed with right 11:00 2 cmfn IDC with DCIS grade 1 ER % WI 41-50% Her2 negative ki67 10% at age 76.  She had surgery LL lobectomy with Dr Garcia in May 2021 for Lung Cancer.  She underwent imaging demonstrating a bilateral asymmetries with recommendation of targeted imaging.  Left breast asymmetry was thought to be post surgical in nature and recommended for 6 month followup US.  The right asymmetry correlated with a 5 mm mass with recommendation of biopsy.  Biopsy was performed on 1/12/2022 demonstrating right 11:00 2 cmfn IDC with DCIS grade 1 ER % WI 41-50% Her2 negative ki67 10%.\par \par She underwent right breast raul  wide lumpectomy on 3/30/2022.  Final surgical pathology demonstrated the invasive tumor to measure 9 mm and 0/2 lymph nodes.  Margins are clear after having additional margins taken.\par \par [facility] Genetic Results- No clinically significant mutation identified.  VUS BRIP 1 gene\par \par 11/29/2021  Bilateral screening mammogram \par Impression:  right focal asymmetry with quiered archtectural distortion in the upper outer right breast.  There is an asymmetry in the upper left breast on MLO view.\par \par 12/27/2022  Bilateral diagnostic mammogram/bilateral breast ultrasound \par Impression:  A 5 mm hypoechoic mass with associated mild distortion in the right breast at 11:00,, 2 cm from the nipple, with mammographic correlate.  Ultrasound guided biopsy with post biopsy mammogram is recommended. \par Probably benign asymmetry in the upper left breast, far posteriorly, most likely related to post surgical changes from prior left lung surgery.  Six month followup diagnostic left breast mammogram is recommended.  BIRADS 4\par \par 1/12/2022 Pathology\par Right breast 11:00, N2 ultrasound guided core biopsy:  Invasive ductal carcinoma, well differentiated.  Grade 1.  DCIS low grade.  ER % WI 41-50% Her2 negative\par \par 2/9/2021  MR breast\par IMPRESSION: Biopsy-proven right breast malignancy. No evidence of multifocal or multicentric disease.\par RECOMMENDATION: Surgical or oncologic management.  BI-RADS 6- Known Biopsy-Proven Malignancy\par \par Final surgical pathology demonstrated the invasive tumor to measure 9 mm and 0/2 lymph nodes.  Margins are clear after having additional margins taken.  Oncotype will be requested.  She is healing well.  Recommendation for follow up in 8 weeks, medical oncology, and radiation oncology.

## 2022-04-13 NOTE — PHYSICAL EXAM
[Normocephalic] : normocephalic [Atraumatic] : atraumatic [EOMI] : extra ocular movement intact [PERRL] : pupils equal, round and reactive to light [Sclera nonicteric] : sclera nonicteric [Supple] : supple [No Supraclavicular Adenopathy] : no supraclavicular adenopathy [Examined in the supine and seated position] : examined in the supine and seated position [No dominant masses] : no dominant masses in right breast  [No dominant masses] : no dominant masses left breast [No Nipple Retraction] : no left nipple retraction [No Nipple Discharge] : no left nipple discharge [No Axillary Lymphadenopathy] : no left axillary lymphadenopathy [No Edema] : no edema [No Rashes] : no rashes [No Ulceration] : no ulceration [de-identified] : No supraclavicular or axillary adenopathy. No dominant masses, normal to palpation. Everted nipple without discharge. Surgical site is healing well.\par  [de-identified] : No supraclavicular or axillary adenopathy. No dominant masses, normal to palpation. Everted nipple without discharge. No skin changes.\par

## 2022-04-28 ENCOUNTER — APPOINTMENT (OUTPATIENT)
Dept: HEMATOLOGY ONCOLOGY | Facility: CLINIC | Age: 77
End: 2022-04-28
Payer: MEDICARE

## 2022-04-28 VITALS
SYSTOLIC BLOOD PRESSURE: 124 MMHG | DIASTOLIC BLOOD PRESSURE: 73 MMHG | BODY MASS INDEX: 31.18 KG/M2 | HEIGHT: 63 IN | HEART RATE: 88 BPM | OXYGEN SATURATION: 97 % | WEIGHT: 176 LBS

## 2022-04-28 PROCEDURE — 99214 OFFICE O/P EST MOD 30 MIN: CPT

## 2022-04-28 NOTE — ADDENDUM
[FreeTextEntry1] : Documented by Kwabena Lamb acting as scribe for Dr. Boone on 04/28/2022. \par \par All Medical record entries made by the Scribe were at my, Dr. Boone's, direction and personally dictated by me on 04/28/2022. I have reviewed the chart and agree that the record accurately reflects my personal performance of the history, physical exam, assessment and plan. I have also personally directed, reviewed, and agreed with the discharge instructions.

## 2022-04-28 NOTE — ASSESSMENT
[FreeTextEntry1] : 76 year old female with stage IA (pT1bN0) right breast IDC grade 1, 9 mm, ER % ID 41-50% Her2 negative.\par Oncotype Dx RS is 14 with <1% benefit from chemo and risk of distant recurrence with Sinclair or AI is 4%.\par \par Discussed the role of endocrine therapy in reducing recurrence risk. Aromatase inhibitors side effects include but not limited to vaginal dryness, hot flashes, arthralgia/musculoskeletal pain (advised can worsen pre-existing joint pain r/t RA) and decrease in bone density- can cause osteopenia/osteoporosis. \par Alternatively discussed Tamoxifen is a SERM, and side effects can include but are not limited to hot flashes, mood changes, vaginal dryness, thromboembolic events and the risk of endometrial cancer (requires GYN evaluation every 6 months- 1 year). Endocrine therapy typically starts after RT is complete.\par \par \par Plan:\par No chemotherapy\par Anastrazole ordered, advise to start after completing RT and record start date. \par Will obtain DEXA results from 2021 from rheumatologist Dr. Guidry ([place]) \par Follow-up with Rad-Onc \par Follow up in 9/22

## 2022-05-03 ENCOUNTER — APPOINTMENT (OUTPATIENT)
Dept: RADIATION ONCOLOGY | Facility: CLINIC | Age: 77
End: 2022-05-03
Payer: MEDICARE

## 2022-05-03 ENCOUNTER — OUTPATIENT (OUTPATIENT)
Dept: OUTPATIENT SERVICES | Facility: HOSPITAL | Age: 77
LOS: 1 days | Discharge: HOME | End: 2022-05-03
Payer: MEDICARE

## 2022-05-03 VITALS
HEART RATE: 88 BPM | SYSTOLIC BLOOD PRESSURE: 151 MMHG | RESPIRATION RATE: 16 BRPM | BODY MASS INDEX: 31.18 KG/M2 | WEIGHT: 176 LBS | OXYGEN SATURATION: 98 % | DIASTOLIC BLOOD PRESSURE: 75 MMHG

## 2022-05-03 DIAGNOSIS — Z90.2 ACQUIRED ABSENCE OF LUNG [PART OF]: Chronic | ICD-10-CM

## 2022-05-03 DIAGNOSIS — Z98.890 OTHER SPECIFIED POSTPROCEDURAL STATES: Chronic | ICD-10-CM

## 2022-05-03 PROCEDURE — 99213 OFFICE O/P EST LOW 20 MIN: CPT

## 2022-05-04 NOTE — LETTER CLOSING
[Sincerely yours,] : Sincerely yours, [FreeTextEntry3] : Benigno Daniel MD\par Physician in Chief\par Department of Radiation Medicine\par [facility] [place]\par [facility] [place]\par \par  of Radiation Medicine\par [facility]par at  [facility]\par \par Radiation \par [facility]/\par [facility] [place]\par [address]\par [place], [place] [address]\par \par Tel: [phone]\par Fax: [phone]\par

## 2022-05-04 NOTE — HISTORY OF PRESENT ILLNESS
[FreeTextEntry1] : This 76 year-old female presents for radiation medicine follow-up and to discuss beginning radiation treatment planning.  Ms. Severino was diagnosed with right breast IDC with DCIS on 1/12/2022.  \par \par Of note, Ms. Severino underwent left lower lobectomy with Dr Garcia in May 2021 for Lung Cancer.  No adjuvant treatment was given.  She is following with Dr. Orourke for surveillance.  \par \par Ms. White underwent right breast raul  wide lumpectomy on 3/30/2022. Final surgical pathology demonstrated the invasive tumor to measure 9 mm and 0/2 lymph nodes. Margins are clear after having additional margins taken.\par  10124 Exp Problem Focused - Mod. Complex

## 2022-05-04 NOTE — DISEASE MANAGEMENT
[Clinical] : TNM Stage: c [IA] : IA [FreeTextEntry4] : invasive ductal carcinoma [TTNM] : 1b [NTNM] : 0 [MTNM] : 0 [de-identified] : 6795 Gy [de-identified] : right breast

## 2022-05-04 NOTE — PHYSICAL EXAM
[Breast Appearance] : normal in appearance [Symmetric] : breasts are symmetric [Breast Palpation Mass] : no palpable masses [Breast Abnormal Lactation (Galactorrhea)] : no nipple discharge [No UE Edema] : there is no upper extremity edema [Normal] : normoactive bowel sounds, soft and nontender, no hepatosplenomegaly or masses appreciated [de-identified] : Right kavon-areolar incision well healed

## 2022-05-04 NOTE — REVIEW OF SYSTEMS
[Negative] : Allergic/Immunologic [FreeTextEntry5] : hypertension, hyperlipidemia [FreeTextEntry6] : sleep apnea, Hx lung cancer, Hx pleural effusion [FreeTextEntry9] : arthritis

## 2022-05-18 PROCEDURE — 77290 THER RAD SIMULAJ FIELD CPLX: CPT | Mod: 26

## 2022-05-18 PROCEDURE — 77334 RADIATION TREATMENT AID(S): CPT | Mod: 26

## 2022-05-23 ENCOUNTER — APPOINTMENT (OUTPATIENT)
Dept: PULMONOLOGY | Facility: CLINIC | Age: 77
End: 2022-05-23
Payer: MEDICARE

## 2022-05-23 VITALS
HEART RATE: 78 BPM | DIASTOLIC BLOOD PRESSURE: 62 MMHG | WEIGHT: 175 LBS | RESPIRATION RATE: 16 BRPM | OXYGEN SATURATION: 98 % | HEIGHT: 63 IN | SYSTOLIC BLOOD PRESSURE: 118 MMHG | BODY MASS INDEX: 31.01 KG/M2

## 2022-05-23 PROCEDURE — 99214 OFFICE O/P EST MOD 30 MIN: CPT

## 2022-05-23 NOTE — PHYSICAL EXAM
[No Acute Distress] : no acute distress [Normal Oropharynx] : normal oropharynx [Normal Appearance] : normal appearance [No Neck Mass] : no neck mass [Normal Rate/Rhythm] : normal rate/rhythm [Normal S1, S2] : normal s1, s2 [No Resp Distress] : no resp distress [Clear to Auscultation Bilaterally] : clear to auscultation bilaterally [No Abnormalities] : no abnormalities [Benign] : benign [Normal Gait] : normal gait [No Clubbing] : no clubbing [No Cyanosis] : no cyanosis [FROM] : FROM [Normal Color/ Pigmentation] : normal color/ pigmentation [No Focal Deficits] : no focal deficits [Oriented x3] : oriented x3 [Normal Affect] : normal affect [Murmur ___ / 6] : murmur [unfilled] / 6 [No Edema] : no edema [TextBox_68] : Improved breath sounds LLL but somewhat diminished.

## 2022-05-23 NOTE — HISTORY OF PRESENT ILLNESS
[Former] : former [On ___] : performed on [unfilled] [Indication ___] : for an indication of [unfilled] [TextBox_4] : Patient with complaints of exercise intolerance post op since resection of lung cancer in 5/2021.\par Some wheeze on awakening but dissipates\par Chest wall tenderness since surgery.\par Pt reports heaviness in chest over the past 2 weeks. She was seen by PCP and was given nebulizer therapy and abx without significant change.\par C/o hoarseness but follows with ENT. \par Pt on Montelukast nightly for ? allergies.\par Pt reports Covid infection in 12/2020 with H/a, fevers, and loss of taste. She did not require therapy and symptoms resolved.\par Pt seen by oncology but no further therapy appears to be required for her lung cancer.\par She is now following with oncology for her recently dx'd breast cancer.\par ? murmur - follows with Dr. Salamanca For XRT per Rad-onc for breast cancer [TextBox_11] : 1.5 [TextBox_13] : 25 [YearQuit] : 1995 [FreeTextEntry9] : Chest CT [FreeTextEntry8] : s/p ARIANA lobectomy

## 2022-05-23 NOTE — REVIEW OF SYSTEMS
[Postnasal Drip] : postnasal drip [SOB on Exertion] : sob on exertion [Back Pain] : back pain [Depression] : depression [Negative] : Endocrine [Anxiety] : no anxiety

## 2022-05-23 NOTE — DISCUSSION/SUMMARY
[FreeTextEntry1] : \par #1. PFTs performed previously were essentially normal though subsequent spirometry was reduced post ARIANA lobectomy for adenoCa and now remains stable; f/u PFTs in 6 months\par #2. The patient does not appear to require chronic BD therapy at this time as pt does not have obstruction on spirometry\par #3. SOBOE is likely related to weight or deconditioning given normal PFTs and limited ambulation due to knee pain\par #4. Diet and exercise for weight loss \par #5. Oncology f/u for recently dx'd breast cancer\par #6. ENT f/u for hoarseness\par #7. Cardiology f/u \par #8. S/p J&J Covid vaccine; refusing other vaccines\par #9. Chest CT with small effusion and shift to right but improvement in aeration and no obvious parenchymal lung disease otherwise\par #10. F/u in 6 months with repeat CT and PFTs\par #11. Reviewed risks of exposure and symptoms of Covid-19 virus, including how the virus is spread and precautions to avoid jered virus.\par \par The patient expressed understanding and agreement with the above recommendations/plan and accepts responsibility to be compliant with recommended testing, therapies, and f/u visits.\par All relevant questions and concerns were addressed.

## 2022-05-23 NOTE — CONSULT LETTER
[Dear  ___] : Dear  [unfilled], [Consult Letter:] : I had the pleasure of evaluating your patient, [unfilled]. [Please see my note below.] : Please see my note below. [Consult Closing:] : Thank you very much for allowing me to participate in the care of this patient.  If you have any questions, please do not hesitate to contact me. [Sincerely,] : Sincerely, [FreeTextEntry3] : Vladimir Comer MD, FCCP, D. ABSM\par Pulmonary and Sleep Medicine\par [facility] at [place]

## 2022-05-25 PROCEDURE — 77295 3-D RADIOTHERAPY PLAN: CPT | Mod: 26

## 2022-05-25 PROCEDURE — 77334 RADIATION TREATMENT AID(S): CPT | Mod: 26

## 2022-05-25 PROCEDURE — 77300 RADIATION THERAPY DOSE PLAN: CPT | Mod: 26

## 2022-05-26 PROCEDURE — 19301 PARTIAL MASTECTOMY: CPT | Mod: RT

## 2022-05-26 PROCEDURE — 88305 TISSUE EXAM BY PATHOLOGIST: CPT

## 2022-05-26 PROCEDURE — 88321 CONSLTJ&REPRT SLD PREP ELSWR: CPT

## 2022-05-26 PROCEDURE — 76098 X-RAY EXAM SURGICAL SPECIMEN: CPT

## 2022-05-26 PROCEDURE — 38792 RA TRACER ID OF SENTINL NODE: CPT | Mod: XU

## 2022-05-26 PROCEDURE — 88342 IMHCHEM/IMCYTCHM 1ST ANTB: CPT

## 2022-05-26 PROCEDURE — 88341 IMHCHEM/IMCYTCHM EA ADD ANTB: CPT

## 2022-05-26 PROCEDURE — 38900 IO MAP OF SENT LYMPH NODE: CPT | Mod: RT

## 2022-05-26 PROCEDURE — 38500 BIOPSY/REMOVAL LYMPH NODES: CPT | Mod: RT

## 2022-05-26 PROCEDURE — A9541: CPT

## 2022-05-26 PROCEDURE — C1889: CPT

## 2022-05-26 PROCEDURE — 88307 TISSUE EXAM BY PATHOLOGIST: CPT

## 2022-05-27 PROCEDURE — 77280 THER RAD SIMULAJ FIELD SMPL: CPT | Mod: 26

## 2022-05-31 ENCOUNTER — NON-APPOINTMENT (OUTPATIENT)
Age: 77
End: 2022-05-31

## 2022-05-31 VITALS
RESPIRATION RATE: 16 BRPM | HEART RATE: 86 BPM | BODY MASS INDEX: 31.53 KG/M2 | SYSTOLIC BLOOD PRESSURE: 144 MMHG | WEIGHT: 178 LBS | OXYGEN SATURATION: 97 % | DIASTOLIC BLOOD PRESSURE: 77 MMHG

## 2022-05-31 PROCEDURE — 77334 RADIATION TREATMENT AID(S): CPT | Mod: 26

## 2022-05-31 PROCEDURE — 77300 RADIATION THERAPY DOSE PLAN: CPT | Mod: 26

## 2022-05-31 PROCEDURE — 77295 3-D RADIOTHERAPY PLAN: CPT | Mod: 26

## 2022-05-31 NOTE — DISEASE MANAGEMENT
[Clinical] : TNM Stage: c [FreeTextEntry4] : invasive ductal carcinoma [TTNM] : 1b [NTNM] : 0 [MTNM] : 0 [IA] : IA [de-identified] : 265 cGy [de-identified] : 5240 cGy [de-identified] : right breast

## 2022-06-01 PROCEDURE — 77427 RADIATION TX MANAGEMENT X5: CPT

## 2022-06-06 ENCOUNTER — NON-APPOINTMENT (OUTPATIENT)
Age: 77
End: 2022-06-06

## 2022-06-06 VITALS
OXYGEN SATURATION: 94 % | DIASTOLIC BLOOD PRESSURE: 72 MMHG | BODY MASS INDEX: 31.53 KG/M2 | WEIGHT: 178 LBS | SYSTOLIC BLOOD PRESSURE: 126 MMHG | HEART RATE: 89 BPM

## 2022-06-06 NOTE — DISEASE MANAGEMENT
[Clinical] : TNM Stage: c [FreeTextEntry4] : invasive ductal carcinoma [TTNM] : 1b [NTNM] : 0 [MTNM] : 0 [IA] : IA [de-identified] : 1060 cGy [de-identified] : 5240 cGy [de-identified] : right breast

## 2022-06-08 PROCEDURE — 77427 RADIATION TX MANAGEMENT X5: CPT

## 2022-06-13 ENCOUNTER — NON-APPOINTMENT (OUTPATIENT)
Age: 77
End: 2022-06-13

## 2022-06-13 NOTE — DISEASE MANAGEMENT
[Clinical] : TNM Stage: c [FreeTextEntry4] : invasive ductal carcinoma [TTNM] : 1b [NTNM] : 0 [MTNM] : 0 [IA] : IA [de-identified] : 5400 cGy [de-identified] : right breast [de-identified] : 5240 cGy

## 2022-06-14 ENCOUNTER — APPOINTMENT (OUTPATIENT)
Dept: SURGERY | Facility: CLINIC | Age: 77
End: 2022-06-14

## 2022-06-15 PROCEDURE — 77427 RADIATION TX MANAGEMENT X5: CPT

## 2022-06-20 PROCEDURE — 77280 THER RAD SIMULAJ FIELD SMPL: CPT | Mod: 26

## 2022-06-21 ENCOUNTER — NON-APPOINTMENT (OUTPATIENT)
Age: 77
End: 2022-06-21

## 2022-06-21 VITALS
WEIGHT: 177 LBS | SYSTOLIC BLOOD PRESSURE: 105 MMHG | HEART RATE: 85 BPM | OXYGEN SATURATION: 96 % | BODY MASS INDEX: 31.35 KG/M2 | RESPIRATION RATE: 16 BRPM | DIASTOLIC BLOOD PRESSURE: 72 MMHG

## 2022-06-21 NOTE — DISEASE MANAGEMENT
[Clinical] : TNM Stage: c [IA] : IA [FreeTextEntry4] : invasive ductal carcinoma [TTNM] : 1b [NTNM] : 0 [MTNM] : 0 [de-identified] : 3975 cGy [de-identified] : 5240 cGy [de-identified] : right breast

## 2022-06-22 ENCOUNTER — APPOINTMENT (OUTPATIENT)
Dept: ORTHOPEDIC SURGERY | Facility: CLINIC | Age: 77
End: 2022-06-22
Payer: MEDICARE

## 2022-06-22 VITALS
WEIGHT: 177 LBS | BODY MASS INDEX: 31.36 KG/M2 | HEART RATE: 78 BPM | DIASTOLIC BLOOD PRESSURE: 76 MMHG | HEIGHT: 63 IN | SYSTOLIC BLOOD PRESSURE: 133 MMHG

## 2022-06-22 PROCEDURE — 20610 DRAIN/INJ JOINT/BURSA W/O US: CPT | Mod: 50

## 2022-06-22 PROCEDURE — 99214 OFFICE O/P EST MOD 30 MIN: CPT | Mod: 25

## 2022-06-22 PROCEDURE — 77427 RADIATION TX MANAGEMENT X5: CPT

## 2022-06-22 NOTE — END OF VISIT
[FreeTextEntry3] : I, Kvng Conte, acted solely as a scribe for Dr. Yury Steven on this date 06/22/2022.

## 2022-06-22 NOTE — PHYSICAL EXAM
[de-identified] : GENERAL APPEARANCE: Well nourished and hydrated, pleasant, alert, and oriented x 3. Appears their stated age. \par HEENT: Normocephalic, extraocular eye motion intact. Nasal septum midline. Oral cavity clear. External auditory canal clear. \par RESPIRATORY: Breath sounds clear and audible in all lobes. No wheezing, No accessory muscle use.\par CARDIOVASCULAR: No apparent abnormalities. No lower leg edema. No varicosities. Pedal pulses are palpable.\par NEUROLOGIC: Sensation is normal, no muscle weakness in the upper or lower extremities.\par DERMATOLOGIC: No apparent skin lesions, moist, warm, no rash.\par SPINE: Cervical spine appears normal and moves freely; thoracic spine appears normal and moves freely; lumbosacral spine appears normal and moves freely, normal, nontender.\par MUSCULOSKELETAL: Hands, wrists, and elbows are normal and move freely, shoulders are normal and move freely. \par Musculoskeletal:. Right knee exam shows medial joint line tenderness, no effusion, ROM 10-90 degrees. \par Left knee exam shows medial joint line tenderness, no effusion. \par 5/5 motor strength in bilateral lower extremities. Sensory: Intact in bilateral lower extremities. DTRs: Biceps, brachioradialis, triceps, patellar, ankle and plantar 2+ and symmetric bilaterally. Pulses: dorsalis pedis, posterior tibial, femoral, popliteal, and radial 2+ and symmetric bilaterally. \par Constitutional: Alert and in no acute distress, but well-appearing.

## 2022-06-22 NOTE — PROCEDURE
[de-identified] : I injected the patient's right knee today with cortisone for primary osteoarthritis.\par \par I discussed at length with the patient the planned steroid and lidocaine injection. The risks, benefits, convalescence and alternatives were reviewed. The possible side effects discussed included but were not limited to: pain, swelling, heat, bleeding, and redness. Symptoms are generally mild but if they are extensive then contact the office. Giving pain relievers by mouth such as NSAIDs or Tylenol can generally treat the reactions to steroid and lidocaine. Rare cases of infection have been noted. Rash, hives and itching may occur post injection. If you have muscle pain or cramps, flushing and or swelling of the face, rapid heart beat, nausea, dizziness, fever, chills, headache, difficulty breathing, swelling in the arms or legs, or have a prickly feeling of your skin, contact a health care provider immediately. Following this discussion, the knee was prepped with Alcohol and under sterile condition the 80 mg Depo-Medrol and 6 cc Lidocaine injection was performed with a 20 gauge needle through a superolateral injection site. The needle was introduced into the joint, aspiration was performed to ensure intra-articular placement and the medication was injected. Upon withdrawal of the needle the site was cleaned with alcohol and a band aid applied. The patient tolerated the injection well and there were no adverse effects. Post injection instructions included no strenuous activity for 24 hours, cryotherapy and if there are any adverse effects to contact the office.\par \par \par \par I injected the patient's left knee today with cortisone for primary osteoarthritis.\par \par I discussed at length with the patient the planned steroid and lidocaine injection. The risks, benefits, convalescence and alternatives were reviewed. The possible side effects discussed included but were not limited to: pain, swelling, heat, bleeding, and redness. Symptoms are generally mild but if they are extensive then contact the office. Giving pain relievers by mouth such as NSAIDs or Tylenol can generally treat the reactions to steroid and lidocaine. Rare cases of infection have been noted. Rash, hives and itching may occur post injection. If you have muscle pain or cramps, flushing and or swelling of the face, rapid heart beat, nausea, dizziness, fever, chills, headache, difficulty breathing, swelling in the arms or legs, or have a prickly feeling of your skin, contact a health care provider immediately. Following this discussion, the knee was prepped with Alcohol and under sterile condition the 80 mg Depo-Medrol and 6 cc Lidocaine injection was performed with a 20 gauge needle through a superolateral injection site. The needle was introduced into the joint, aspiration was performed to ensure intra-articular placement and the medication was injected. Upon withdrawal of the needle the site was cleaned with alcohol and a band aid applied. The patient tolerated the injection well and there were no adverse effects. Post injection instructions included no strenuous activity for 24 hours, cryotherapy and if there are any adverse effects to contact the office.

## 2022-06-27 ENCOUNTER — NON-APPOINTMENT (OUTPATIENT)
Age: 77
End: 2022-06-27

## 2022-06-27 VITALS — WEIGHT: 176 LBS | BODY MASS INDEX: 31.18 KG/M2

## 2022-06-27 DIAGNOSIS — T66.XXXA RADIATION SICKNESS, UNSPECIFIED, INITIAL ENCOUNTER: ICD-10-CM

## 2022-06-27 NOTE — VITALS
[Maximal Pain Intensity: 2/10] : 2/10 [Least Pain Intensity: 0/10] : 0/10 [Pain Description/Quality: ___] : Pain description/quality: [unfilled] [Pain Duration: ___] : Pain duration: [unfilled] [Pain Location: ___] : Pain Location: [unfilled] [Pain Interferes with ADLs] : Pain interferes with activities of daily living. [OTC] : OTC [Adjuvant (neuroleptics)] : adjuvant (neuroleptics) [90: Able to carry normal activity; minor signs or symptoms of disease.] : 90: Able to carry normal activity; minor signs or symptoms of disease.  [ECOG Performance Status: 1 - Restricted in physically strenuous activity but ambulatory and able to carry out work of a light or sedentary nature] : Performance Status: 1 - Restricted in physically strenuous activity but ambulatory and able to carry out work of a light or sedentary nature, e.g., light house work, office work

## 2022-06-27 NOTE — DISEASE MANAGEMENT
[Clinical] : TNM Stage: c [FreeTextEntry4] : invasive ductal carcinoma [TTNM] : 1b [NTNM] : 0 [MTNM] : 0 [IA] : IA [de-identified] : 3209 cGy [de-identified] : 5240 cGy [de-identified] : right breast

## 2022-07-06 ENCOUNTER — NON-APPOINTMENT (OUTPATIENT)
Age: 77
End: 2022-07-06

## 2022-07-14 ENCOUNTER — APPOINTMENT (OUTPATIENT)
Dept: SURGERY | Facility: CLINIC | Age: 77
End: 2022-07-14

## 2022-07-14 VITALS
TEMPERATURE: 98 F | OXYGEN SATURATION: 98 % | DIASTOLIC BLOOD PRESSURE: 71 MMHG | HEIGHT: 63 IN | HEART RATE: 76 BPM | SYSTOLIC BLOOD PRESSURE: 125 MMHG | BODY MASS INDEX: 31.18 KG/M2 | WEIGHT: 176 LBS

## 2022-07-14 PROCEDURE — 99214 OFFICE O/P EST MOD 30 MIN: CPT

## 2022-07-20 ENCOUNTER — NON-APPOINTMENT (OUTPATIENT)
Age: 77
End: 2022-07-20

## 2022-08-04 ENCOUNTER — NON-APPOINTMENT (OUTPATIENT)
Age: 77
End: 2022-08-04

## 2022-08-04 ENCOUNTER — APPOINTMENT (OUTPATIENT)
Dept: RADIATION ONCOLOGY | Facility: CLINIC | Age: 77
End: 2022-08-04

## 2022-08-11 NOTE — HISTORY OF PRESENT ILLNESS
[FreeTextEntry1] : The patient is a 77 y/o woman with h/o stage I  NSC Lung cancer, s/p LLL lobectomy. She now has been diagnosed with IDCA of the right breast, cT1b,N0M0 (IA).  Ms. Severino completed radiation therapy on  as a component of her breast cancer management on 6/28/2022.\par \par At last on-treatment visit:\par Right breast surgical incision well healed.\par Skin right breast and axilla with brisk erythema, moist desquamation right inframammary fold.  Begin Silvadene and lidocaine.\par \par The patient presented for skin check.\par Skin right breast inframammary fold with desquamation resolving, one felt-tip area still open. Continue Silvadene until healed. \par \par Today patient reports skin is intact without redness.  Denies arm or hand swelling.  \par \par Taking anastrozole at the direction of Dr. Boone.

## 2022-08-11 NOTE — DISEASE MANAGEMENT
[Clinical] : TNM Stage: c [IA] : IA [FreeTextEntry4] : invasive ductal carcinoma [TTNM] : 1b [NTNM] : 0 [MTNM] : 0 [de-identified] : 5240 cGy [de-identified] : right breast

## 2022-08-13 NOTE — HISTORY OF PRESENT ILLNESS
[FreeTextEntry1] : Re: Newly diagnosed right breast IDC grade 1 ER  RI 41-50 Rek6mlb negative\par \par I had the pleasure of seeing GRACIE MACK  in the office today for a postoperative visit.\par \par Gracie was diagnosed with right 11:00 2 cmfn IDC with DCIS grade 1 ER % RI 41-50% Her2 negative ki67 10% at age 76.  She had surgery LL lobectomy with Dr Garcia in May 2021 for Lung Cancer.  She underwent imaging demonstrating a bilateral asymmetries with recommendation of targeted imaging.  Left breast asymmetry was thought to be post surgical in nature and recommended for 6 month followup US.  The right asymmetry correlated with a 5 mm mass with recommendation of biopsy.  Biopsy was performed on 1/12/2022 demonstrating right 11:00 2 cmfn IDC with DCIS grade 1 ER % RI 41-50% Her2 negative ki67 10%.\par \par She underwent right breast raul  wide lumpectomy on 3/30/2022.  Final surgical pathology demonstrated the invasive tumor to measure 9 mm and 0/2 lymph nodes.  Margins are clear after having additional margins taken.\par \par [facility] Genetic Results- No clinically significant mutation identified.  VUS BRIP 1 gene\par \par 11/29/2021  Bilateral screening mammogram \par Impression:  right focal asymmetry with quiered archtectural distortion in the upper outer right breast.  There is an asymmetry in the upper left breast on MLO view.\par \par 12/27/2022  Bilateral diagnostic mammogram/bilateral breast ultrasound \par Impression:  A 5 mm hypoechoic mass with associated mild distortion in the right breast at 11:00,, 2 cm from the nipple, with mammographic correlate.  Ultrasound guided biopsy with post biopsy mammogram is recommended. \par Probably benign asymmetry in the upper left breast, far posteriorly, most likely related to post surgical changes from prior left lung surgery.  Six month followup diagnostic left breast mammogram is recommended.  BIRADS 4\par \par 1/12/2022 Pathology\par Right breast 11:00, N2 ultrasound guided core biopsy:  Invasive ductal carcinoma, well differentiated.  Grade 1.  DCIS low grade.  ER % RI 41-50% Her2 negative\par \par 2/9/2021  MR breast\par IMPRESSION: Biopsy-proven right breast malignancy. No evidence of multifocal or multicentric disease.\par RECOMMENDATION: Surgical or oncologic management.  BI-RADS 6- Known Biopsy-Proven Malignancy\par \par Final surgical pathology demonstrated the invasive tumor to measure 9 mm and 0/2 lymph nodes.  Margins are clear after having additional margins taken.  Oncotype will be requested.  She is healing well.  Recommendation for follow up in 8 weeks, medical oncology, and radiation oncology.

## 2022-08-13 NOTE — PHYSICAL EXAM
[Normocephalic] : normocephalic [Atraumatic] : atraumatic [EOMI] : extra ocular movement intact [PERRL] : pupils equal, round and reactive to light [Sclera nonicteric] : sclera nonicteric [Supple] : supple [No Supraclavicular Adenopathy] : no supraclavicular adenopathy [Examined in the supine and seated position] : examined in the supine and seated position [No dominant masses] : no dominant masses in right breast  [No dominant masses] : no dominant masses left breast [No Nipple Retraction] : no left nipple retraction [No Nipple Discharge] : no left nipple discharge [No Axillary Lymphadenopathy] : no left axillary lymphadenopathy [No Edema] : no edema [No Rashes] : no rashes [No Ulceration] : no ulceration [de-identified] : No supraclavicular or axillary adenopathy. No dominant masses, normal to palpation. Everted nipple without discharge. Surgical site is healing well.\par  [de-identified] : No supraclavicular or axillary adenopathy. No dominant masses, normal to palpation. Everted nipple without discharge. No skin changes.\par

## 2022-08-13 NOTE — ASSESSMENT
[FreeTextEntry1] : 77 yo underwent right breast raul  localized wide lumpectomy with SLNB on 3/30/2022.  Final surgical pathology demonstrated the invasive tumor to measure 9 mm and 0/2 lymph nodes.  Margins are clear after having additional margins taken.  Oncotype will be requested.  She is healing well.  Recommendation for follow up in 8 weeks, medical oncology, and radiation oncology.\par -  Ultrasound Mammo Jan 2023\par -  Consult with radiation oncology Dr Daniel\par -  Consult with medical oncology Dr Rodriguez\par -  Office visit end of Jan 2023 for clinical exam\par \par \par

## 2022-09-02 ENCOUNTER — OUTPATIENT (OUTPATIENT)
Dept: OUTPATIENT SERVICES | Facility: HOSPITAL | Age: 77
LOS: 1 days | Discharge: HOME | End: 2022-09-02

## 2022-09-02 DIAGNOSIS — C34.90 MALIGNANT NEOPLASM OF UNSPECIFIED PART OF UNSPECIFIED BRONCHUS OR LUNG: ICD-10-CM

## 2022-09-02 DIAGNOSIS — Z98.890 OTHER SPECIFIED POSTPROCEDURAL STATES: Chronic | ICD-10-CM

## 2022-09-02 DIAGNOSIS — Z90.2 ACQUIRED ABSENCE OF LUNG [PART OF]: Chronic | ICD-10-CM

## 2022-09-08 ENCOUNTER — APPOINTMENT (OUTPATIENT)
Dept: HEMATOLOGY ONCOLOGY | Facility: CLINIC | Age: 77
End: 2022-09-08

## 2022-09-08 ENCOUNTER — RESULT REVIEW (OUTPATIENT)
Age: 77
End: 2022-09-08

## 2022-09-08 ENCOUNTER — LABORATORY RESULT (OUTPATIENT)
Age: 77
End: 2022-09-08

## 2022-09-08 VITALS
WEIGHT: 174.38 LBS | OXYGEN SATURATION: 95 % | HEIGHT: 63 IN | HEART RATE: 77 BPM | SYSTOLIC BLOOD PRESSURE: 126 MMHG | BODY MASS INDEX: 30.9 KG/M2 | DIASTOLIC BLOOD PRESSURE: 70 MMHG

## 2022-09-08 LAB
BASOPHILS # BLD AUTO: 0.1 K/UL — SIGNIFICANT CHANGE UP (ref 0–0.2)
BASOPHILS NFR BLD AUTO: 0.8 % — SIGNIFICANT CHANGE UP (ref 0–2)
EOSINOPHIL # BLD AUTO: 0.1 K/UL — SIGNIFICANT CHANGE UP (ref 0–0.5)
EOSINOPHIL NFR BLD AUTO: 1.5 % — SIGNIFICANT CHANGE UP (ref 0–6)
HCT VFR BLD CALC: 36.4 % — SIGNIFICANT CHANGE UP (ref 34.5–45)
HGB BLD-MCNC: 12.4 G/DL — SIGNIFICANT CHANGE UP (ref 11.5–15.5)
LYMPHOCYTES # BLD AUTO: 1.1 K/UL — SIGNIFICANT CHANGE UP (ref 1–3.3)
LYMPHOCYTES # BLD AUTO: 14 % — SIGNIFICANT CHANGE UP (ref 13–44)
MCHC RBC-ENTMCNC: 31.6 PG — SIGNIFICANT CHANGE UP (ref 27–34)
MCHC RBC-ENTMCNC: 34 G/DL — SIGNIFICANT CHANGE UP (ref 32–36)
MCV RBC AUTO: 92.7 FL — SIGNIFICANT CHANGE UP (ref 80–100)
MONOCYTES # BLD AUTO: 0.6 K/UL — SIGNIFICANT CHANGE UP (ref 0–0.9)
MONOCYTES NFR BLD AUTO: 7.6 % — SIGNIFICANT CHANGE UP (ref 2–14)
NEUTROPHILS # BLD AUTO: 5.7 K/UL — SIGNIFICANT CHANGE UP (ref 1.8–7.4)
NEUTROPHILS NFR BLD AUTO: 76.1 % — SIGNIFICANT CHANGE UP (ref 43–77)
PLATELET # BLD AUTO: 169 K/UL — SIGNIFICANT CHANGE UP (ref 150–400)
RBC # BLD: 3.92 M/UL — SIGNIFICANT CHANGE UP (ref 3.8–5.2)
RBC # FLD: 11.8 % — SIGNIFICANT CHANGE UP (ref 10.3–14.5)
WBC # BLD: 7.5 K/UL — SIGNIFICANT CHANGE UP (ref 3.8–10.5)
WBC # FLD AUTO: 7.5 K/UL — SIGNIFICANT CHANGE UP (ref 3.8–10.5)

## 2022-09-08 PROCEDURE — 99214 OFFICE O/P EST MOD 30 MIN: CPT

## 2022-09-08 NOTE — HISTORY OF PRESENT ILLNESS
[de-identified] : Ms. White is a 76 year old female with newly diagnosed right breast IDC grade 1 ER % W[place] 41-50% Her2 negative at age 76.\par \par Patient underwent screening mammogram on 11/29/21 demonstrating right focal asymmetry with queried architectural distortion is indeterminate and left upper asymmetry is indeterminate. Obtained diagnostic mammogram and sonogram on 12/27/21 demonstrating a 5mm hypoechoic mass with associated mild distortion in the right breast at 11:00, 2cm from the nipple with recommendation for biopsy; additional findings of probably benign asymmetry in the upper left breast, far posteriorly, most likely related to postsurgical changes from prior left lung surgery with recommendation for 6 month follow up diagnostic mammogram \par \par 1/11/2022 Underwent right breast 11:00, core biopsy- IDC, well differentiated, Denis score 5/9 (2+ 2 + 1), measuring at least 3 mm, DCIS with low \par nuclear grade with microcalcification, lymphovascular permeation by tumor not seen. ER: % WV: 41-50% Her-2:  Negative (1+) Ki67 10%\par \par 02/08/2022 [employer] Genetic testing- negative no clinically significant mutation identified. Variant of uncertain significance- BRIP1\par \par 02/09/2022 MR Breast- Biopsy-proven right breast malignancy (7 mm). No evidence of multifocal or multicentric disease.\par \par \par Planning for right breast lumpectomy with Dr. Pickett \par Recalls recent DEXA from 2021 was normal\par Has only received Hydroxychloroquine for rheumatoid arthritis. Has intermittent joint pain/stiffness currently \par Following up with pulmonologist Dr. Comer for imaging related to history of lung adenocarcinoma \par \par PMH: Left lower lobe adenocarcinoma s/p lobectomy on 5/4/21,Hypertension, Hyperlipidemia, Rheumatoid Arthritis\par PSH: Left lobectomy on 5/4/21\par SH: History of smoking, quit 30 years ago \par FH: Denies family history of cancer\par Rheumatologist- Dr. Davis \par Pulmonologist- Dr. Comer [de-identified] : Patient presents for follow-up.\par Began anastrazole on 7/14/22. \par Reports upset stomach, minor aches in the legs,\par Reports hot flashes\par Reports constipation\par Reports feeling fatigued however, still gets up every day \par Takes Vit D \par Currently taking Vit D (1000 units)  & B12 (2500 units) \par

## 2022-09-08 NOTE — CONSULT LETTER
[Dear  ___] : Dear  [unfilled], [Consult Letter:] : I had the pleasure of evaluating your patient, [unfilled]. [Please see my note below.] : Please see my note below. [Consult Closing:] : Thank you very much for allowing me to participate in the care of this patient.  If you have any questions, please do not hesitate to contact me. [Sincerely,] : Sincerely, [FreeTextEntry3] : Clarence Boone MD\par Medical Oncology/Hematology\par [facility] [place], [facility]\par \par \par [facility], [place]\par

## 2022-09-08 NOTE — ADDENDUM
[FreeTextEntry1] : Documented by Prudence Slater acting as scribe for Dr. Boone on 09/08/2022.\par \par All Medical record entries made by the Scribe were at my, Dr. Boone, direction and personally dictated by me on 09/08/2022. I have reviewed the chart and agree that the record accurately reflects my personal performance of the history, physical exam, assessment and plan. I have also personally directed, reviewed, and agreed with the discharge instructions.

## 2022-09-08 NOTE — ASSESSMENT
[FreeTextEntry1] : 77 year old female with stage IA (pT1bN0) right breast IDC grade 1, 9 mm, ER % [place] 41-50% Her2 negative.\par Oncotype Dx RS is 14 with <1% benefit from chemo and risk of distant recurrence with Sinclair or AI is 4%.\par Completed adjuvant breast RT on 6/28/22. \par Began Anastrazole on 7/16/22 after completing RT,\par \par \par Tolerating anastrazole well overall with the exception of minor side effects, including upset stomach which has improved, stiffness of thighs, and mild hot flashes, <1 episode per day\par DEXA bone density - 02/12/21 - b/l femur neck osteopenia - \par \par Discussed treatment of osteopenia with Zometa or Prolia once every 6 months.Side effects of bisphosphonates can include but are not limited to flu like symptoms, fatigue, hypocalcemia and osteonecrosis of jaw. Patient was advised to get a dental evaluation prior to treatment, last routine visit in 06/2022. \par \par \par Plan:\par Continue Anastrazole\par Mammo/sono in 01/2023\par Repeat DEXA due 02/2023\par Continue Vitamin D\par RTO in 3 months

## 2022-09-08 NOTE — RESULTS/DATA
[FreeTextEntry1] : 3/30/22 Pathology: R Breast Infiltrating ductal carcinoma (IDC), Grade 1 Orange Score (1,2,1). Ductal carcinoma in situ (DCIS), cribriform type, intermediate nuclear\par grade. Tumor size (IDC + DCIS) :  9 mm. IDC and DCIS are present less than 1 mm from the anterior margin of\par resection. Additional lateral and anterior margins were negative.  DCIS is focally present less than 1 mm from the lateral margin. Biopsy site changes. 0/2 lymph nodes with clear margins. pT1bN0

## 2022-09-08 NOTE — PHYSICAL EXAM
[Normal] : affect appropriate [de-identified] : supple [de-identified] : no edema [de-identified] : no palpable breast mass or axilary adenopathy

## 2022-09-20 DIAGNOSIS — C50.411 MALIGNANT NEOPLASM OF UPPER-OUTER QUADRANT OF RIGHT FEMALE BREAST: ICD-10-CM

## 2022-09-21 ENCOUNTER — APPOINTMENT (OUTPATIENT)
Dept: ORTHOPEDIC SURGERY | Facility: CLINIC | Age: 77
End: 2022-09-21

## 2022-09-21 VITALS
DIASTOLIC BLOOD PRESSURE: 70 MMHG | WEIGHT: 174 LBS | HEART RATE: 91 BPM | SYSTOLIC BLOOD PRESSURE: 124 MMHG | BODY MASS INDEX: 30.83 KG/M2 | HEIGHT: 63 IN

## 2022-09-21 PROCEDURE — 99214 OFFICE O/P EST MOD 30 MIN: CPT | Mod: 25

## 2022-09-21 PROCEDURE — 20610 DRAIN/INJ JOINT/BURSA W/O US: CPT | Mod: 50

## 2022-09-21 NOTE — PHYSICAL EXAM
[de-identified] : GENERAL APPEARANCE: Well nourished and hydrated, pleasant, alert, and oriented x 3. Appears their stated age. \par HEENT: Normocephalic, extraocular eye motion intact. Nasal septum midline. Oral cavity clear. External auditory canal clear. \par RESPIRATORY: Breath sounds clear and audible in all lobes. No wheezing, No accessory muscle use.\par CARDIOVASCULAR: No apparent abnormalities. No lower leg edema. No varicosities. Pedal pulses are palpable.\par NEUROLOGIC: Sensation is normal, no muscle weakness in the upper or lower extremities.\par DERMATOLOGIC: No apparent skin lesions, moist, warm, no rash.\par SPINE: Cervical spine appears normal and moves freely; thoracic spine appears normal and moves freely; lumbosacral spine appears normal and moves freely, normal, nontender.\par MUSCULOSKELETAL: Hands, wrists, and elbows are normal and move freely, shoulders are normal and move freely. \par Musculoskeletal:. Right knee exam shows medial joint line tenderness, no effusion, ROM 10-90 degrees. \par Left knee exam shows medial joint line tenderness, no effusion. \par 5/5 motor strength in bilateral lower extremities. Sensory: Intact in bilateral lower extremities. DTRs: Biceps, brachioradialis, triceps, patellar, ankle and plantar 2+ and symmetric bilaterally. Pulses: dorsalis pedis, posterior tibial, femoral, popliteal, and radial 2+ and symmetric bilaterally. \par Constitutional: Alert and in no acute distress, but well-appearing.

## 2022-09-21 NOTE — PROCEDURE
[de-identified] : I injected the patient's right knee today with cortisone for primary osteoarthritis.\par \par I discussed at length with the patient the planned steroid and lidocaine injection. The risks, benefits, convalescence and alternatives were reviewed. The possible side effects discussed included but were not limited to: pain, swelling, heat, bleeding, and redness. Symptoms are generally mild but if they are extensive then contact the office. Giving pain relievers by mouth such as NSAIDs or Tylenol can generally treat the reactions to steroid and lidocaine. Rare cases of infection have been noted. Rash, hives and itching may occur post injection. If you have muscle pain or cramps, flushing and or swelling of the face, rapid heart beat, nausea, dizziness, fever, chills, headache, difficulty breathing, swelling in the arms or legs, or have a prickly feeling of your skin, contact a health care provider immediately. Following this discussion, the knee was prepped with Alcohol and under sterile condition the 80 mg Depo-Medrol and 6 cc Lidocaine injection was performed with a 20 gauge needle through a superolateral injection site. The needle was introduced into the joint, aspiration was performed to ensure intra-articular placement and the medication was injected. Upon withdrawal of the needle the site was cleaned with alcohol and a band aid applied. The patient tolerated the injection well and there were no adverse effects. Post injection instructions included no strenuous activity for 24 hours, cryotherapy and if there are any adverse effects to contact the office.\par \par \par \par I injected the patient's left knee today with cortisone for primary osteoarthritis.\par \par I discussed at length with the patient the planned steroid and lidocaine injection. The risks, benefits, convalescence and alternatives were reviewed. The possible side effects discussed included but were not limited to: pain, swelling, heat, bleeding, and redness. Symptoms are generally mild but if they are extensive then contact the office. Giving pain relievers by mouth such as NSAIDs or Tylenol can generally treat the reactions to steroid and lidocaine. Rare cases of infection have been noted. Rash, hives and itching may occur post injection. If you have muscle pain or cramps, flushing and or swelling of the face, rapid heart beat, nausea, dizziness, fever, chills, headache, difficulty breathing, swelling in the arms or legs, or have a prickly feeling of your skin, contact a health care provider immediately. Following this discussion, the knee was prepped with Alcohol and under sterile condition the 80 mg Depo-Medrol and 6 cc Lidocaine injection was performed with a 20 gauge needle through a superolateral injection site. The needle was introduced into the joint, aspiration was performed to ensure intra-articular placement and the medication was injected. Upon withdrawal of the needle the site was cleaned with alcohol and a band aid applied. The patient tolerated the injection well and there were no adverse effects. Post injection instructions included no strenuous activity for 24 hours, cryotherapy and if there are any adverse effects to contact the office.

## 2022-09-21 NOTE — HISTORY OF PRESENT ILLNESS
[Pain Location] : pain [Worsening] : worsening [7] : a current pain level of 7/10 [2] : a minimum pain level of 2/10 [8] : a maximum pain level of 8/10 [Bending] : worsened by bending [Walking] : worsened by walking [Knee Flexion] : worsened with knee flexion [Rest] : relieved by rest [de-identified] : 78 y/o F presents with b/l knee pain. She was previously having more pain in the right knee than in the left knee, but she now has more pain in the left knee. Last appointment she bilateral knee cortisone injections which gave her lasting relief. Today she is interested in a repeat injections. She has a known hx of bone on bone medial compartmental osteoarthritis of the right knee and near bone on bone medial compartmental osteoarthritis of the left knee. She received cortisone injection at her last appointment which helped for about 5 weeks. She has received PRP injections in the past from Dr. Stephan Chapa. She has pain with bending, walking, and standing. She was recently diagnosed with breast cancer and is undergoing RT.  [de-identified] : stairs

## 2022-09-21 NOTE — DISCUSSION/SUMMARY
[Medication Risks Reviewed] : Medication risks reviewed [Surgical risks reviewed] : Surgical risks reviewed [de-identified] : 78 y/o F with bone on bone medial compartmental osteoarthritis of the right knee and near bone on bone medial compartmental osteoarthritis of the left knee. The patient is a candidate for a TKA; however, last appointment she discussed that she is not interested in surgery given her recent diagnosis of breast cancer. She is currently undergoing active treatment. For now the pt opted for a repeat bilateral knee cortisone injections which she tolerated well. She will f/u with us in 3 months for a possible repeat injections. \par \par The patient is a 77 year individual with end stage arthritis of their right knee joint. Based upon the patient's continued symptoms and failure to respond to conservative treatment I have recommended a right total knee arthroplasty for this patient. A long discussion took place with the patient describing what a total joint replacement is and what the procedure would entail. A total knee arthroplasty model, similar to the implant that was used during the operation, was utilized to demonstrate and to discuss the various bearing surfaces of the implants. The hospitalization and post-operative care and rehabilitation were also discussed. The use of perioperative antibiotics and DVT prophylaxis were discussed. The risk, benefits and alternatives to a surgical intervention were discussed at length with the patient. The patient was also advised of risks related to the medical comorbidities, elevated body mass index (BMI), and smoking where applicable. We discussed how to reduce modifiable risk factors and encouraged smoking cessation were applicable.. A lengthy discussion took place to review the most common complications including but not limited to: deep vein thrombosis, pulmonary embolus, heart attack, stroke, infection, wound breakdown, numbness, damage to nerves, tendon, muscles, arteries or other blood vessels, death and other possible complications from anesthesia. The patient was told that we will take steps to minimize these risks by using sterile technique, antibiotics and DVT prophylaxis when appropriate and follow the patient postoperatively in the office setting to monitor progress. The possibility of recurrent pain, no improvement in pain and actual worsening of pain were also discussed with the patient.\par The discharge plan of care focused on the patient going home following surgery. The patient was encouraged to make the necessary arrangements to have someone stay with them when they are discharged home. Following discharge, a home care nurse was to the patient. The home care nurse would open the patient’s home care case and request home physical therapy services. Home physical therapy was to commence following discharge provided it was appropriate and covered by the health insurance benefit plan. \par The benefits of surgery were discussed with the patient including the potential for improving her current clinical condition through operative intervention. Alternatives to surgical intervention including continued conservative management were also discussed in detail. All questions were answered to the satisfaction of the patient. The treatment plan of care, as well as a model of a total knee arthroplasty equivalent to the one that will be used for their total joint replacement, was shared with the patient. The patient agreed to the plan of care as well as the use of implants in their total joint replacement. \par  \par

## 2022-11-03 ENCOUNTER — APPOINTMENT (OUTPATIENT)
Dept: RADIATION ONCOLOGY | Facility: CLINIC | Age: 77
End: 2022-11-03

## 2022-11-08 NOTE — HISTORY OF PRESENT ILLNESS
[FreeTextEntry1] : STEPHEN MACK is a 77 year old F with h/o stage I  NSC Lung cancer, s/p LLL lobectomy. She was diagnosed with IDC of the right breast, cT1b,N0M0 (IA).  Ms. Mack completed radiation therapy on as a component of her breast cancer management on 6/28/2022.\par \par She returns today for a routine follow up. \par Tolerating anastrozole

## 2022-11-09 ENCOUNTER — APPOINTMENT (OUTPATIENT)
Dept: RADIATION ONCOLOGY | Facility: CLINIC | Age: 77
End: 2022-11-09

## 2022-11-22 ENCOUNTER — APPOINTMENT (OUTPATIENT)
Dept: PULMONOLOGY | Facility: CLINIC | Age: 77
End: 2022-11-22

## 2022-11-22 VITALS
HEART RATE: 80 BPM | RESPIRATION RATE: 16 BRPM | SYSTOLIC BLOOD PRESSURE: 132 MMHG | DIASTOLIC BLOOD PRESSURE: 74 MMHG | OXYGEN SATURATION: 98 %

## 2022-11-22 VITALS — BODY MASS INDEX: 31.28 KG/M2 | WEIGHT: 170 LBS | HEIGHT: 62 IN

## 2022-11-22 PROCEDURE — 94010 BREATHING CAPACITY TEST: CPT

## 2022-11-22 PROCEDURE — 85018 HEMOGLOBIN: CPT | Mod: QW

## 2022-11-22 PROCEDURE — 94727 GAS DIL/WSHOT DETER LNG VOL: CPT

## 2022-11-22 PROCEDURE — 94729 DIFFUSING CAPACITY: CPT

## 2022-11-22 PROCEDURE — 99214 OFFICE O/P EST MOD 30 MIN: CPT | Mod: 25

## 2022-11-22 RX ORDER — SILVER SULFADIAZINE 10 MG/G
1 CREAM TOPICAL
Qty: 1 | Refills: 2 | Status: DISCONTINUED | COMMUNITY
Start: 2022-06-27 | End: 2022-11-22

## 2022-11-22 RX ORDER — BIMATOPROST 0.1 MG/ML
0.01 SOLUTION/ DROPS OPHTHALMIC
Qty: 8 | Refills: 0 | Status: ACTIVE | COMMUNITY
Start: 2022-05-26

## 2022-11-22 RX ORDER — MONTELUKAST 10 MG/1
10 TABLET, FILM COATED ORAL
Qty: 30 | Refills: 0 | Status: DISCONTINUED | COMMUNITY
Start: 2022-01-27 | End: 2022-11-22

## 2022-11-22 RX ORDER — LIDOCAINE HYDROCHLORIDE 30 MG/G
3 CREAM TOPICAL
Qty: 1 | Refills: 1 | Status: DISCONTINUED | COMMUNITY
Start: 2022-06-27 | End: 2022-11-22

## 2022-11-22 RX ORDER — LIDOCAINE 5 G/100G
5 OINTMENT TOPICAL
Qty: 45 | Refills: 2 | Status: DISCONTINUED | COMMUNITY
Start: 2022-06-27 | End: 2022-11-22

## 2022-11-22 RX ORDER — CEFADROXIL 500 MG/1
500 CAPSULE ORAL
Qty: 14 | Refills: 0 | Status: DISCONTINUED | COMMUNITY
Start: 2022-07-29

## 2022-11-22 NOTE — CONSULT LETTER
[Dear  ___] : Dear  [unfilled], [Consult Letter:] : I had the pleasure of evaluating your patient, [unfilled]. [Please see my note below.] : Please see my note below. [Consult Closing:] : Thank you very much for allowing me to participate in the care of this patient.  If you have any questions, please do not hesitate to contact me. [Sincerely,] : Sincerely, [FreeTextEntry3] : Vladimir Comer MD, FCCP, D. ABSM\par Pulmonary and Sleep Medicine\par [facility] at [place]

## 2022-11-22 NOTE — DISCUSSION/SUMMARY
[FreeTextEntry1] : \par #1. PFTs performed previously were essentially normal though subsequent spirometry was reduced post ARIANA lobectomy for adenoCa and now remains stable; f/u PFTs intermittently\par #2. The patient does not appear to require chronic BD therapy at this time as pt does not have obstruction on spirometry and restriction is likely due to lobectomy and XRT changes from breast cancer therapy as now seen on CT\par #3. SOBOE is likely related to weight or deconditioning given normal PFTs and limited ambulation due to knee pain\par #4. Diet and exercise for weight loss \par #5. Oncology f/u for lung cancer and breast cancer\par #6. ENT f/u for hoarseness\par #7. Cardiology f/u \par #8. S/p J&J Covid vaccine; refusing other vaccines\par #9. Chest CT with small effusion and shift to right but improvement in aeration and no obvious parenchymal lung disease otherwise\par #10. F/u in 6 months with repeat CT and PFTs\par #11. Reviewed risks of exposure and symptoms of Covid-19 virus, including how the virus is spread and precautions to avoid jered virus.\par \par The patient expressed understanding and agreement with the above recommendations/plan and accepts responsibility to be compliant with recommended testing, therapies, and f/u visits.\par All relevant questions and concerns were addressed.

## 2022-11-22 NOTE — PROCEDURE
[FreeTextEntry1] : PFTs 12/10/21 - normal\par Spirometry 7/29/21 - normal but reduced from previous due to ARIANA lobectomy\par Spirometry 2/11/22 - near normal and unchanged from previous\par PFTs 11/22/22 - Mild restriction likely due to prior ARIANA lobectomy and now XRT changes seen on CT\par

## 2022-11-22 NOTE — HISTORY OF PRESENT ILLNESS
[Former] : former [On ___] : performed on [unfilled] [Indication ___] : for an indication of [unfilled] [TextBox_4] : Patient with complaints of exercise intolerance post op since resection of lung cancer in 5/2021 and then dx'd with breast cancer in 3/2022\par Some wheeze on awakening but dissipates\par Chest wall tenderness since surgery.\par C/o hoarseness but follows with ENT. \par Pt on Montelukast nightly for ? allergies.\par Pt reports Covid infection in 12/2020 with H/a, fevers, and loss of taste. She did not require therapy and symptoms resolved.\par Pt seen by oncology but no further therapy appears to be required for her lung cancer.\par She is now following with oncology for her recently dx'd breast cancer.\par ? murmur - follows with Dr. Holt\roberto For XRT per Rad-onc for breast cancer [TextBox_11] : 1.5 [TextBox_13] : 25 [YearQuit] : 1995 [FreeTextEntry9] : Chest CT [FreeTextEntry8] : s/p ARIANA lobectomy

## 2022-11-30 ENCOUNTER — OUTPATIENT (OUTPATIENT)
Dept: OUTPATIENT SERVICES | Facility: HOSPITAL | Age: 77
LOS: 1 days | Discharge: HOME | End: 2022-11-30

## 2022-11-30 DIAGNOSIS — Z90.2 ACQUIRED ABSENCE OF LUNG [PART OF]: Chronic | ICD-10-CM

## 2022-11-30 DIAGNOSIS — C34.90 MALIGNANT NEOPLASM OF UNSPECIFIED PART OF UNSPECIFIED BRONCHUS OR LUNG: ICD-10-CM

## 2022-11-30 DIAGNOSIS — Z98.890 OTHER SPECIFIED POSTPROCEDURAL STATES: Chronic | ICD-10-CM

## 2022-12-02 ENCOUNTER — APPOINTMENT (OUTPATIENT)
Dept: ORTHOPEDIC SURGERY | Facility: CLINIC | Age: 77
End: 2022-12-02

## 2022-12-02 VITALS — HEIGHT: 62 IN | WEIGHT: 170 LBS | BODY MASS INDEX: 31.28 KG/M2

## 2022-12-02 PROCEDURE — 20610 DRAIN/INJ JOINT/BURSA W/O US: CPT | Mod: 50

## 2022-12-02 PROCEDURE — 99214 OFFICE O/P EST MOD 30 MIN: CPT | Mod: 25

## 2022-12-02 PROCEDURE — 73562 X-RAY EXAM OF KNEE 3: CPT | Mod: 26,50

## 2022-12-02 NOTE — PHYSICAL EXAM
[de-identified] : GENERAL APPEARANCE: Well nourished and hydrated, pleasant, alert, and oriented x 3. Appears their stated age. \par HEENT: Normocephalic, extraocular eye motion intact. Nasal septum midline. Oral cavity clear. External auditory canal clear. \par RESPIRATORY: Breath sounds clear and audible in all lobes. No wheezing, No accessory muscle use.\par CARDIOVASCULAR: No apparent abnormalities. No lower leg edema. No varicosities. Pedal pulses are palpable.\par NEUROLOGIC: Sensation is normal, no muscle weakness in the upper or lower extremities.\par DERMATOLOGIC: No apparent skin lesions, moist, warm, no rash.\par SPINE: Cervical spine appears normal and moves freely; thoracic spine appears normal and moves freely; lumbosacral spine appears normal and moves freely, normal, nontender.\par MUSCULOSKELETAL: Hands, wrists, and elbows are normal and move freely, shoulders are normal and move freely. \par Musculoskeletal:. Right knee exam shows medial joint line tenderness, no effusion, ROM 10-90 degrees. \par Left knee exam shows medial joint line tenderness, no effusion. \par 5/5 motor strength in bilateral lower extremities. Sensory: Intact in bilateral lower extremities. DTRs: Biceps, brachioradialis, triceps, patellar, ankle and plantar 2+ and symmetric bilaterally. Pulses: dorsalis pedis, posterior tibial, femoral, popliteal, and radial 2+ and symmetric bilaterally. \par Constitutional: Alert and in no acute distress, but well-appearing. \par   [de-identified] : Bilateral knee 5 views showed medial compartment bone-on-bone osteoarthritis right greater than left with varus alignment there is significant osteophytes.  No pathologic lesions were identified.

## 2022-12-02 NOTE — PROCEDURE
[de-identified] : I injected the patient's b/l knee today with cortisone 80mg for o.a\par \par I discussed at length with the patient the planned steroid and lidocaine injection. The risks, benefits, convalescence and alternatives were reviewed. The possible side effects discussed included but were not limited to: pain, swelling, heat, bleeding, and redness. Symptoms are generally mild but if they are extensive then contact the office. Giving pain relievers by mouth such as NSAIDs or Tylenol can generally treat the reactions to steroid and lidocaine. Rare cases of infection have been noted. Rash, hives and itching may occur post injection. If you have muscle pain or cramps, flushing and or swelling of the face, rapid heart beat, nausea, dizziness, fever, chills, headache, difficulty breathing, swelling in the arms or legs, or have a prickly feeling of your skin, contact a health care provider immediately. Following this discussion, the knee was prepped with Alcohol and under sterile condition the 80 mg Depo-Medrol and 6 cc Lidocaine injection was performed with a 20 gauge needle through a superolateral injection site. The needle was introduced into the joint, aspiration was performed to ensure intra-articular placement and the medication was injected. Upon withdrawal of the needle the site was cleaned with alcohol and a band aid applied. The patient tolerated the injection well and there were no adverse effects. Post injection instructions included no strenuous activity for 24 hours, cryotherapy and if there are any adverse effects to contact the office. \par

## 2022-12-02 NOTE — HISTORY OF PRESENT ILLNESS
[Pain Location] : pain [Worsening] : worsening [5] : a current pain level of 5/10 [de-identified] : 78 y/o F presents with b/l knee pain. She was previously having more pain in the right knee than in the left knee, but she now has more pain in the left knee. Last appointment she bilateral knee cortisone injections which gave her lasting relief. Today she is interested in a repeat injections. She has a known hx of bone on bone medial compartmental osteoarthritis of the right knee and near bone on bone medial compartmental osteoarthritis of the left knee. She received cortisone injection at her last appointment which helped for about 5 weeks. She has received PRP injections in the past from Dr. Stephan Chapa. She has pain with bending, walking, and standing. She was recently diagnosed with breast cancer and is undergoing RT. \par \par \par \par STEPHEN MACK presents with pain. She states the symptoms are worsening. Pain levels include a current pain level of 7/10, a minimum pain level of 2/10 and a maximum pain level of 8/10. \par \par \par Modifying factors - worsened by bending, worsened by walking and worsened with knee flexion . stairs. Relieving factors include relieved by rest. \par \par

## 2022-12-02 NOTE — DISCUSSION/SUMMARY
[Surgical risks reviewed] : Surgical risks reviewed [de-identified] : Medication risks reviewed. Surgical risks reviewed. 78 y/o F with bone on bone medial compartmental osteoarthritis of the right knee and near bone on bone medial compartmental osteoarthritis of the left knee.  The x-ray showed progression of the disease. The patient is a candidate for a TKA; however, last appointment she discussed that she is not interested in surgery given her recent diagnosis of breast cancer. She is currently undergoing active treatment. For now the pt opted for a repeat bilateral knee cortisone injections which she tolerated well. She will f/u with us in 3 months for a possible repeat injections. \par \par The patient is a 77 year individual with end stage arthritis of their right knee joint. Based upon the patient's continued symptoms and failure to respond to conservative treatment I have recommended a right total knee arthroplasty for this patient. A long discussion took place with the patient describing what a total joint replacement is and what the procedure would entail. A total knee arthroplasty model, similar to the implant that was used during the operation, was utilized to demonstrate and to discuss the various bearing surfaces of the implants. The hospitalization and post-operative care and rehabilitation were also discussed. The use of perioperative antibiotics and DVT prophylaxis were discussed. The risk, benefits and alternatives to a surgical intervention were discussed at length with the patient. The patient was also advised of risks related to the medical comorbidities, elevated body mass index (BMI), and smoking where applicable. We discussed how to reduce modifiable risk factors and encouraged smoking cessation were applicable.. A lengthy discussion took place to review the most common complications including but not limited to: deep vein thrombosis, pulmonary embolus, heart attack, stroke, infection, wound breakdown, numbness, damage to nerves, tendon, muscles, arteries or other blood vessels, death and other possible complications from anesthesia. The patient was told that we will take steps to minimize these risks by using sterile technique, antibiotics and DVT prophylaxis when appropriate and follow the patient postoperatively in the office setting to monitor progress. The possibility of recurrent pain, no improvement in pain and actual worsening of pain were also discussed with the patient.\par The discharge plan of care focused on the patient going home following surgery. The patient was encouraged to make the necessary arrangements to have someone stay with them when they are discharged home. Following discharge, a home care nurse was to the patient. The home care nurse would open the patient’s home care case and request home physical therapy services. Home physical therapy was to commence following discharge provided it was appropriate and covered by the health insurance benefit plan. \par The benefits of surgery were discussed with the patient including the potential for improving her current clinical condition through operative intervention. Alternatives to surgical intervention including continued conservative management were also discussed in detail. All questions were answered to the satisfaction of the patient. The treatment plan of care, as well as a model of a total knee arthroplasty equivalent to the one that will be used for their total joint replacement, was shared with the patient. The patient agreed to the plan of care as well as the use of implants in their total joint replacement. \par

## 2022-12-06 ENCOUNTER — APPOINTMENT (OUTPATIENT)
Dept: HEMATOLOGY ONCOLOGY | Facility: CLINIC | Age: 77
End: 2022-12-06

## 2022-12-06 ENCOUNTER — RESULT REVIEW (OUTPATIENT)
Age: 77
End: 2022-12-06

## 2022-12-06 VITALS
DIASTOLIC BLOOD PRESSURE: 71 MMHG | HEIGHT: 62 IN | SYSTOLIC BLOOD PRESSURE: 117 MMHG | WEIGHT: 175.05 LBS | HEART RATE: 75 BPM | OXYGEN SATURATION: 95 % | BODY MASS INDEX: 32.21 KG/M2

## 2022-12-06 VITALS — TEMPERATURE: 97.6 F

## 2022-12-06 LAB
BASOPHILS # BLD AUTO: 0.1 K/UL — SIGNIFICANT CHANGE UP (ref 0–0.2)
BASOPHILS NFR BLD AUTO: 1 % — SIGNIFICANT CHANGE UP (ref 0–2)
EOSINOPHIL # BLD AUTO: 0.1 K/UL — SIGNIFICANT CHANGE UP (ref 0–0.5)
EOSINOPHIL NFR BLD AUTO: 1.1 % — SIGNIFICANT CHANGE UP (ref 0–6)
HCT VFR BLD CALC: 36.4 % — SIGNIFICANT CHANGE UP (ref 34.5–45)
HGB BLD-MCNC: 12.5 G/DL — SIGNIFICANT CHANGE UP (ref 11.5–15.5)
LYMPHOCYTES # BLD AUTO: 1.1 K/UL — SIGNIFICANT CHANGE UP (ref 1–3.3)
LYMPHOCYTES # BLD AUTO: 10.9 % — LOW (ref 13–44)
MCHC RBC-ENTMCNC: 32.8 PG — SIGNIFICANT CHANGE UP (ref 27–34)
MCHC RBC-ENTMCNC: 34.4 G/DL — SIGNIFICANT CHANGE UP (ref 32–36)
MCV RBC AUTO: 95.2 FL — SIGNIFICANT CHANGE UP (ref 80–100)
MONOCYTES # BLD AUTO: 0.8 K/UL — SIGNIFICANT CHANGE UP (ref 0–0.9)
MONOCYTES NFR BLD AUTO: 7.7 % — SIGNIFICANT CHANGE UP (ref 2–14)
NEUTROPHILS # BLD AUTO: 7.7 K/UL — HIGH (ref 1.8–7.4)
NEUTROPHILS NFR BLD AUTO: 79.2 % — HIGH (ref 43–77)
PLATELET # BLD AUTO: 194 K/UL — SIGNIFICANT CHANGE UP (ref 150–400)
RBC # BLD: 3.82 M/UL — SIGNIFICANT CHANGE UP (ref 3.8–5.2)
RBC # FLD: 11.6 % — SIGNIFICANT CHANGE UP (ref 10.3–14.5)
WBC # BLD: 9.8 K/UL — SIGNIFICANT CHANGE UP (ref 3.8–10.5)
WBC # FLD AUTO: 9.8 K/UL — SIGNIFICANT CHANGE UP (ref 3.8–10.5)

## 2022-12-06 PROCEDURE — 99214 OFFICE O/P EST MOD 30 MIN: CPT

## 2022-12-06 NOTE — CONSULT LETTER
[Dear  ___] : Dear  [unfilled], [Consult Letter:] : I had the pleasure of evaluating your patient, [unfilled]. [Please see my note below.] : Please see my note below. [Consult Closing:] : Thank you very much for allowing me to participate in the care of this patient.  If you have any questions, please do not hesitate to contact me. [Sincerely,] : Sincerely, [FreeTextEntry3] : Clarence Boone MD\par Medical Oncology/Hematology\par [facility] [place]\par \par \par [facility]\par

## 2022-12-06 NOTE — PHYSICAL EXAM
[Normal] : no JVD, no calf tenderness, venous stasis changes, varices [de-identified] : supple [de-identified] : no edema

## 2022-12-06 NOTE — RESULTS/DATA
[FreeTextEntry1] : 3/30/22 Pathology: R Breast Infiltrating ductal carcinoma (IDC), Grade 1 Okolona Score (1,2,1). Ductal carcinoma in situ (DCIS), cribriform type, intermediate nuclear\par grade. Tumor size (IDC + DCIS) :  9 mm. IDC and DCIS are present less than 1 mm from the anterior margin of\par resection. Additional lateral and anterior margins were negative.  DCIS is focally present less than 1 mm from the lateral margin. Biopsy site changes. 0/2 lymph nodes with clear margins. pT1bN0

## 2022-12-06 NOTE — ASSESSMENT
[FreeTextEntry1] : 77 year old female with stage IA (pT1bN0) right breast IDC grade 1, 9 mm, ER % MS 41-50% Her2 negative.\par Oncotype Dx RS is 14 with <1% benefit from chemo and risk of distant recurrence with Sinclair or AI is 4%.\par Completed adjuvant breast RT on 6/28/22. \par Began Anastrazole on 7/16/22 after completing RT\par 02/12/21 DEXA- b/l femur neck osteopenia \par \par Tolerating anastrazole well overall \par Patient refused breast exam today \par Patient declines treatment of osteopenia at this time, repeat pending 2/2023 with rheumatologist Dr Davis\par \par Plan:\par Continue Anastrazole daily, refilled \par Mammo/sono in 01/2023\par Repeat DEXA due 02/2023, patient prefers to obtain with rheumatologist Dr Davis, declines Rx from provider. Continue Vitamin D3 OTC daily. Vitamin D level ordered.\par Advised Dr Pickett is no longer within [facility]. Referral sent to breast surgeon Dr Mendez, patient has contact info, advised to f/u with Dr Mendez. \par Right knee pain- follow up with ortho Dr. Steven \par Advised to call with concerns/symptoms\par RTO in 3 months with Dr Boone

## 2022-12-06 NOTE — HISTORY OF PRESENT ILLNESS
[de-identified] : Ms. White is a 76 year old female with newly diagnosed right breast IDC grade 1 ER % MI 41-50% Her2 negative at age 76.\par \par Patient underwent screening mammogram on 11/29/21 demonstrating right focal asymmetry with queried architectural distortion is indeterminate and left upper asymmetry is indeterminate. Obtained diagnostic mammogram and sonogram on 12/27/21 demonstrating a 5mm hypoechoic mass with associated mild distortion in the right breast at 11:00, 2cm from the nipple with recommendation for biopsy; additional findings of probably benign asymmetry in the upper left breast, far posteriorly, most likely related to postsurgical changes from prior left lung surgery with recommendation for 6 month follow up diagnostic mammogram \par \par 1/11/2022 Underwent right breast 11:00, core biopsy- IDC, well differentiated, Denis score 5/9 (2+ 2 + 1), measuring at least 3 mm, DCIS with low \par nuclear grade with microcalcification, lymphovascular permeation by tumor not seen. ER: % MI: 41-50% Her-2:  Negative (1+) Ki67 10%\par \par 02/08/2022 [facility] Genetic testing- negative no clinically significant mutation identified. Variant of uncertain significance- BRIP1\par \par 02/09/2022 MR Breast- Biopsy-proven right breast malignancy (7 mm). No evidence of multifocal or multicentric disease.\par \par \par Planning for right breast lumpectomy with Dr. Pickett \par Recalls recent DEXA from 2021 was normal\par Has only received Hydroxychloroquine for rheumatoid arthritis. Has intermittent joint pain/stiffness currently \par Following up with pulmonologist Dr. Comer for imaging related to history of lung adenocarcinoma \par \par PMH: Left lower lobe adenocarcinoma s/p lobectomy on 5/4/21,Hypertension, Hyperlipidemia, Rheumatoid Arthritis\par PSH: Left lobectomy on 5/4/21\par SH: History of smoking, quit 30 years ago \par FH: Denies family history of cancer\par Rheumatologist- Dr. Davis \par Pulmonologist- Dr. Comer [de-identified] : Patient presents for follow-up. Began anastrazole on 7/14/22. \par \par Notes compliance to Anastrozole daily \par Taking vitamin D3 OTC daily \par Has pre-existing right knee pain associates with arthritis from prior to starting Anastrozole, has not progressed since starting Anastrozole, has been receiving cortisone injections with orthopedic Dr Steven, f/u with Dr Steven closely, defers surgical intervention. Denies any other pain. \par Has clear rhinitis for one week, f/u with PCP Dr Bravo today or tomorrow, denies progression of rhinitis. Denies fever, cough, dyspnea, sick contacts\par Has intermittent mild fatigue, not affecting QOL \par Denies ha, dizziness\par Denies abdominal pain, nausea, vomiting \par Refuses breast exam today \par Declines treatment of osteopenia at this time, repeat pending 2/2023 with rheumatologist Dr Davis

## 2022-12-07 ENCOUNTER — APPOINTMENT (OUTPATIENT)
Dept: RADIATION ONCOLOGY | Facility: CLINIC | Age: 77
End: 2022-12-07

## 2022-12-07 VITALS
WEIGHT: 173 LBS | SYSTOLIC BLOOD PRESSURE: 161 MMHG | HEART RATE: 75 BPM | RESPIRATION RATE: 16 BRPM | BODY MASS INDEX: 31.64 KG/M2 | OXYGEN SATURATION: 95 % | DIASTOLIC BLOOD PRESSURE: 81 MMHG

## 2022-12-07 PROCEDURE — 99213 OFFICE O/P EST LOW 20 MIN: CPT

## 2022-12-07 NOTE — DISEASE MANAGEMENT
[FreeTextEntry4] : invasive ductal carcinoma [TTNM] : 1b [NTNM] : 0 [MTNM] : 0 [de-identified] : 5240 cGy [de-identified] : right breast [Clinical] : TNM Stage: c [IA] : IA

## 2022-12-08 NOTE — LETTER CLOSING
[Sincerely yours,] : Sincerely yours, [FreeTextEntry3] : Benigno Daniel MD\par Physician in Chief\par Department of Radiation Medicine\par [facility]\par [facility] [place]\par \par  of Radiation Medicine\par [facility]\par at  [facility]\par \par Radiation \par [facility]/\par [facility] [place]\par [address]\par [place], [place] [address]\par \par Tel: [phone]\par Fax: [phone]\par

## 2022-12-08 NOTE — REVIEW OF SYSTEMS
[FreeTextEntry5] : hypertension, hyperlipidemia [FreeTextEntry6] : non small cell lung cancer and left lower lobectomy, sleep apnea, pleural effusion [FreeTextEntry9] : arthritis

## 2022-12-08 NOTE — ASSESSMENT
[No evidence of disease] : No evidence of disease [FreeTextEntry1] : minimal treatment related sequeale of mild right breast edema.

## 2022-12-08 NOTE — PHYSICAL EXAM
[Normal] : oriented to person, place and time, the affect was normal, the mood was normal and not anxious [de-identified] : very good cosmesis.

## 2022-12-08 NOTE — VITALS
[Maximal Pain Intensity: 2/10] : 2/10 [Least Pain Intensity: 0/10] : 0/10 [Pain Location: ___] : Pain Location: [unfilled] [NSAID/Non-Opioid] : NSAID/Non-Opioid [80: Normal activity with effort; some signs or symptoms of disease.] : 80: Normal activity with effort; some signs or symptoms of disease.  [ECOG Performance Status: 1 - Restricted in physically strenuous activity but ambulatory and able to carry out work of a light or sedentary nature] : Performance Status: 1 - Restricted in physically strenuous activity but ambulatory and able to carry out work of a light or sedentary nature, e.g., light house work, office work

## 2022-12-08 NOTE — LETTER GREETING
[Follow-Up] : Your patient, [unfilled] was seen in my office today for follow-up [Please see my note below.] : Please see my note below.

## 2022-12-08 NOTE — HISTORY OF PRESENT ILLNESS
[FreeTextEntry1] : STEPHEN MACK is a 77 year old Female with h/o stage I non small cell lung cancer, s/p LLL lobectomy.  She was diagnosed with invasive ductal carcinoma of the right breast, cT1b,N0M0 ([place]).  Ms. aMck completed radiation therapy on as a component of her breast cancer management on 6/28/2022.\par \par She is being seen for routine follow up. \par Tolerating anastrozole at the direction of Dr. Boone.

## 2022-12-08 NOTE — DISEASE MANAGEMENT
[FreeTextEntry4] : invasive ductal carcinoma [TTNM] : 1b [NTNM] : 0 [MTNM] : 0 [de-identified] : 5240 cGy [de-identified] : right breast

## 2022-12-09 LAB
25(OH)D3 SERPL-MCNC: 38.9 NG/ML
ALBUMIN SERPL ELPH-MCNC: 4.1 G/DL
ALP BLD-CCNC: 95 U/L
ALT SERPL-CCNC: 25 U/L
ANION GAP SERPL CALC-SCNC: 10 MMOL/L
AST SERPL-CCNC: 22 U/L
BILIRUB SERPL-MCNC: 0.2 MG/DL
BUN SERPL-MCNC: 25 MG/DL
CALCIUM SERPL-MCNC: 10.1 MG/DL
CHLORIDE SERPL-SCNC: 100 MMOL/L
CO2 SERPL-SCNC: 28 MMOL/L
CREAT SERPL-MCNC: 0.93 MG/DL
EGFR: 64 ML/MIN/1.73M2
GLUCOSE SERPL-MCNC: 103 MG/DL
POTASSIUM SERPL-SCNC: 4.3 MMOL/L
PROT SERPL-MCNC: 6.4 G/DL
SODIUM SERPL-SCNC: 139 MMOL/L

## 2023-01-09 ENCOUNTER — EMERGENCY (EMERGENCY)
Facility: HOSPITAL | Age: 78
LOS: 1 days | Discharge: HOME | End: 2023-01-09
Attending: EMERGENCY MEDICINE
Payer: MEDICARE

## 2023-01-09 VITALS
SYSTOLIC BLOOD PRESSURE: 147 MMHG | HEIGHT: 62 IN | TEMPERATURE: 98 F | OXYGEN SATURATION: 98 % | WEIGHT: 158.95 LBS | DIASTOLIC BLOOD PRESSURE: 72 MMHG | HEART RATE: 111 BPM | RESPIRATION RATE: 18 BRPM

## 2023-01-09 DIAGNOSIS — Z98.890 OTHER SPECIFIED POSTPROCEDURAL STATES: Chronic | ICD-10-CM

## 2023-01-09 DIAGNOSIS — Z90.2 ACQUIRED ABSENCE OF LUNG [PART OF]: Chronic | ICD-10-CM

## 2023-01-09 PROCEDURE — 99284 EMERGENCY DEPT VISIT MOD MDM: CPT | Mod: FS

## 2023-01-09 NOTE — ED PROVIDER NOTE - PHYSICAL EXAMINATION
General: In NAD, non-toxic/well-appearing.  Skin: No rashes or lesions. Warm, dry, color normal for race.   Head: Normocephalic/atraumatic.   Eyes: Sclera anicteric, conjunctivae clear b/l. PERRLA, EOMI.   Neck: Supple, FROM.   Cardio: Rate and rhythm regular. No audible murmur.  Resp: Breath sounds vesicular, symmetrical and without rales, rhonchi or wheezing b/l.  Abd: Non-distended. Soft, non-tender. No rebound, guarding. No CVA tenderness.  Rectal: +External hemorrhoids noted without thrombosis.   MSK: MAEx4. FROM.   Neuro: A&Ox3.

## 2023-01-09 NOTE — ED PROVIDER NOTE - OBJECTIVE STATEMENT
78 yo female PMHx lung cancer (remission), breast cancer (remission), HTN, HLD presents to ED c/o constipation. Last BM, 1-2 days ago. Reports has been straining since this morning, now having bleeding. Medicating with Ducolex and prune juice.   Denies abdominal surgeries, abdominal pain, vomiting.

## 2023-01-09 NOTE — ED PROVIDER NOTE - ATTENDING APP SHARED VISIT CONTRIBUTION OF CARE
The patient discussed with PA    Abdominal Pain    I, Blaise Chin, performed the initial face to face bedside interview with this patient regarding history of present illness, review of symptoms and relevant past medical, social and family history.  I completed an independent physical examination.  I was the initial provider who evaluated this patient. I have signed out the follow up of any pending tests (i.e. labs, radiological studies) to the ACP.  I have communicated the patient’s plan of care and disposition with the ACP.

## 2023-01-09 NOTE — ED ADULT TRIAGE NOTE - CHIEF COMPLAINT QUOTE
pt came in c/o of rectal bleeding, constipation x 1 or 2 days. Took dulcolax and tried an enema but don't want to strain too much.

## 2023-01-09 NOTE — ED PROVIDER NOTE - CLINICAL SUMMARY MEDICAL DECISION MAKING FREE TEXT BOX
78 yo female PMHx lung cancer (remission), breast cancer (remission), HTN, HLD presents to ED c/o constipation. Last BM 1-2 days ago. +Non thrombosed hemorrhoid on exam. Received enema in ED. CT demonstration stool burden. Offered disimpaction, deferred. Has GI to follow up with. OTC medications. Medically stable for discharge.

## 2023-01-09 NOTE — ED PROVIDER NOTE - NSFOLLOWUPINSTRUCTIONS_ED_ALL_ED_FT
- MiraLax: take every day until improved.   - Senna: take nightly until improved.   - Please bring all documentation from your ED visit to any related future follow up appointment.  - Please call to schedule follow up appointment with your primary care physician within 24-48 hours.  - Please seek immediate medical attention or return to the ED for any new/worsening, signs/symptoms, or concerns.    Feel better!       Hemorrhoids    The colon, with 3 close-ups of the rectum. One is normal and the other 2 show external and internal hemorrhoids.   Hemorrhoids are swollen veins in and around the rectum or anus. There are two types of hemorrhoids:  •Internal hemorrhoids. These occur in the veins that are just inside the rectum. They may poke through to the outside and become irritated and painful.      •External hemorrhoids. These occur in the veins that are outside the anus and can be felt as a painful swelling or hard lump near the anus.      Most hemorrhoids do not cause serious problems, and they can be managed with home treatments such as diet and lifestyle changes. If home treatments do not help the symptoms, procedures can be done to shrink or remove the hemorrhoids.      What are the causes?    This condition is caused by increased pressure in the anal area. This pressure may result from various things, including:  •Constipation.      •Straining to have a bowel movement.      •Diarrhea.      •Pregnancy.      •Obesity.      •Sitting for long periods of time.      •Heavy lifting or other activity that causes you to strain.      •Anal sex.      •Riding a bike for a long period of time.        What are the signs or symptoms?    Symptoms of this condition include:  •Pain.      •Anal itching or irritation.      •Rectal bleeding.      •Leakage of stool (feces).      •Anal swelling.      •One or more lumps around the anus.        How is this diagnosed?    This condition can often be diagnosed through a visual exam. Other exams or tests may also be done, such as:  •An exam that involves feeling the rectal area with a gloved hand (digital rectal exam).      •An exam of the anal canal that is done using a small tube (anoscope).      •A blood test, if you have lost a significant amount of blood.      •A test to look inside the colon using a flexible tube with a camera on the end (sigmoidoscopy or colonoscopy).        How is this treated?    This condition can usually be treated at home. However, various procedures may be done if dietary changes, lifestyle changes, and other home treatments do not help your symptoms. These procedures can help make the hemorrhoids smaller or remove them completely. Some of these procedures involve surgery, and others do not. Common procedures include:  •Rubber band ligation. Rubber bands are placed at the base of the hemorrhoids to cut off their blood supply.      •Sclerotherapy. Medicine is injected into the hemorrhoids to shrink them.      •Infrared coagulation. A type of light energy is used to get rid of the hemorrhoids.      •Hemorrhoidectomy surgery. The hemorrhoids are surgically removed, and the veins that supply them are tied off.      •Stapled hemorrhoidopexy surgery. The surgeon staples the base of the hemorrhoid to the rectal wall.        Follow these instructions at home:      Eating and drinking   A sampling of vegetables and other plant-based foods.   •Eat foods that have a lot of fiber in them, such as whole grains, beans, nuts, fruits, and vegetables.      •Ask your health care provider about taking products that have added fiber (fiber supplements).      •Reduce the amount of fat in your diet. You can do this by eating low-fat dairy products, eating less red meat, and avoiding processed foods.      •Drink enough fluid to keep your urine pale yellow.        Managing pain and swelling   A bathtub partially filled with water.   •Take warm sitz baths for 20 minutes, 3–4 times a day to ease pain and discomfort. You may do this in a bathtub or using a portable sitz bath that fits over the toilet.    •If directed, apply ice to the affected area. Using ice packs between sitz baths may be helpful.  •Put ice in a plastic bag.      •Place a towel between your skin and the bag.      •Leave the ice on for 20 minutes, 2–3 times a day.        General instructions     •Take over-the-counter and prescription medicines only as told by your health care provider.      •Use medicated creams or suppositories as told.      •Get regular exercise. Ask your health care provider how much and what kind of exercise is best for you. In general, you should do moderate exercise for at least 30 minutes on most days of the week (150 minutes each week). This can include activities such as walking, biking, or yoga.      •Go to the bathroom when you have the urge to have a bowel movement. Do not wait.      •Avoid straining to have bowel movements.      •Keep the anal area dry and clean. Use wet toilet paper or moist towelettes after a bowel movement.      • Do not sit on the toilet for long periods of time. This increases blood pooling and pain.      •Keep all follow-up visits as told by your health care provider. This is important.        Contact a health care provider if you have:    •Increasing pain and swelling that are not controlled by treatment or medicine.      •Difficulty having a bowel movement, or you are unable to have a bowel movement.      •Pain or inflammation outside the area of the hemorrhoids.        Get help right away if you have:    •Uncontrolled bleeding from your rectum.        Summary    •Hemorrhoids are swollen veins in and around the rectum or anus.      •Most hemorrhoids can be managed with home treatments such as diet and lifestyle changes.      •Taking warm sitz baths can help ease pain and discomfort.      •In severe cases, procedures or surgery can be done to shrink or remove the hemorrhoids.      This information is not intended to replace advice given to you by your health care provider. Make sure you discuss any questions you have with your health care provider.      Constipation, Adult      Constipation is when a person has fewer than three bowel movements in a week, has difficulty having a bowel movement, or has stools (feces) that are dry, hard, or larger than normal. Constipation may be caused by an underlying condition. It may become worse with age if a person takes certain medicines and does not take in enough fluids.      Follow these instructions at home:      Eating and drinking      •Eat foods that have a lot of fiber, such as beans, whole grains, and fresh fruits and vegetables.      •Limit foods that are low in fiber and high in fat and processed sugars, such as fried or sweet foods. These include french fries, hamburgers, cookies, candies, and soda.      •Drink enough fluid to keep your urine pale yellow.      General instructions     •Exercise regularly or as told by your health care provider. Try to do 150 minutes of moderate exercise each week.      •Use the bathroom when you have the urge to go. Do not hold it in.      •Take over-the-counter and prescription medicines only as told by your health care provider. This includes any fiber supplements.    •During bowel movements:   •Practice deep breathing while relaxing the lower abdomen.      •Practice pelvic floor relaxation.        •Watch your condition for any changes. Let your health care provider know about them.      •Keep all follow-up visits as told by your health care provider. This is important.        Contact a health care provider if:    •You have pain that gets worse.      •You have a fever.      •You do not have a bowel movement after 4 days.      •You vomit.      •You are not hungry or you lose weight.      •You are bleeding from the opening between the buttocks (anus).      •You have thin, pencil-like stools.        Get help right away if:    •You have a fever and your symptoms suddenly get worse.      •You leak stool or have blood in your stool.      •Your abdomen is bloated.      •You have severe pain in your abdomen.      •You feel dizzy or you faint.        Summary    •Constipation is when a person has fewer than three bowel movements in a week, has difficulty having a bowel movement, or has stools (feces) that are dry, hard, or larger than normal.      •Eat foods that have a lot of fiber, such as beans, whole grains, and fresh fruits and vegetables.      •Drink enough fluid to keep your urine pale yellow.      •Take over-the-counter and prescription medicines only as told by your health care provider. This includes any fiber supplements.      This information is not intended to replace advice given to you by your health care provider. Make sure you discuss any questions you have with your health care provider.

## 2023-01-09 NOTE — ED PROVIDER NOTE - NSICDXPASTMEDICALHX_GEN_ALL_CORE_FT
PAST MEDICAL HISTORY:  GERD (gastroesophageal reflux disease)     Hyperlipidemia     Hypertension     Lung cancer     OA (osteoarthritis) bilateral knee    Obstructive sleep apnea on CPAP     Solitary pulmonary nodule

## 2023-01-09 NOTE — ED PROVIDER NOTE - PATIENT PORTAL LINK FT
You can access the FollowMyHealth Patient Portal offered by [facility] by registering at the following website: [email or web address]. By joining [facility]’s FollowMyHealth portal, you will also be able to view your health information using other applications (apps) compatible with our system.

## 2023-01-09 NOTE — ED PROVIDER NOTE - PROGRESS NOTE DETAILS
YAA Almeida: All results reviewed and discussed with patient. Deferred fecal disimpaction. Has GI doctor to follow up with.

## 2023-01-10 VITALS
OXYGEN SATURATION: 98 % | HEART RATE: 83 BPM | RESPIRATION RATE: 17 BRPM | SYSTOLIC BLOOD PRESSURE: 119 MMHG | DIASTOLIC BLOOD PRESSURE: 59 MMHG | TEMPERATURE: 98 F

## 2023-01-10 LAB
ALBUMIN SERPL ELPH-MCNC: 4.2 G/DL — SIGNIFICANT CHANGE UP (ref 3.3–5.2)
ALP SERPL-CCNC: 101 U/L — SIGNIFICANT CHANGE UP (ref 40–120)
ALT FLD-CCNC: 26 U/L — SIGNIFICANT CHANGE UP
ANION GAP SERPL CALC-SCNC: 12 MMOL/L — SIGNIFICANT CHANGE UP (ref 5–17)
AST SERPL-CCNC: 38 U/L — HIGH
BASOPHILS # BLD AUTO: 0.04 K/UL — SIGNIFICANT CHANGE UP (ref 0–0.2)
BASOPHILS NFR BLD AUTO: 0.3 % — SIGNIFICANT CHANGE UP (ref 0–2)
BILIRUB SERPL-MCNC: 0.4 MG/DL — SIGNIFICANT CHANGE UP (ref 0.4–2)
BLD GP AB SCN SERPL QL: SIGNIFICANT CHANGE UP
BUN SERPL-MCNC: 20.5 MG/DL — HIGH (ref 8–20)
CALCIUM SERPL-MCNC: 10.6 MG/DL — HIGH (ref 8.4–10.5)
CHLORIDE SERPL-SCNC: 94 MMOL/L — LOW (ref 96–108)
CO2 SERPL-SCNC: 28 MMOL/L — SIGNIFICANT CHANGE UP (ref 22–29)
CREAT SERPL-MCNC: 0.7 MG/DL — SIGNIFICANT CHANGE UP (ref 0.5–1.3)
EGFR: 90 ML/MIN/1.73M2 — SIGNIFICANT CHANGE UP
EOSINOPHIL # BLD AUTO: 0 K/UL — SIGNIFICANT CHANGE UP (ref 0–0.5)
EOSINOPHIL NFR BLD AUTO: 0 % — SIGNIFICANT CHANGE UP (ref 0–6)
GLUCOSE SERPL-MCNC: 114 MG/DL — HIGH (ref 70–99)
HCT VFR BLD CALC: 34.8 % — SIGNIFICANT CHANGE UP (ref 34.5–45)
HGB BLD-MCNC: 12.3 G/DL — SIGNIFICANT CHANGE UP (ref 11.5–15.5)
IMM GRANULOCYTES NFR BLD AUTO: 0.4 % — SIGNIFICANT CHANGE UP (ref 0–0.9)
LYMPHOCYTES # BLD AUTO: 0.72 K/UL — LOW (ref 1–3.3)
LYMPHOCYTES # BLD AUTO: 6 % — LOW (ref 13–44)
MCHC RBC-ENTMCNC: 31.7 PG — SIGNIFICANT CHANGE UP (ref 27–34)
MCHC RBC-ENTMCNC: 35.3 GM/DL — SIGNIFICANT CHANGE UP (ref 32–36)
MCV RBC AUTO: 89.7 FL — SIGNIFICANT CHANGE UP (ref 80–100)
MONOCYTES # BLD AUTO: 0.56 K/UL — SIGNIFICANT CHANGE UP (ref 0–0.9)
MONOCYTES NFR BLD AUTO: 4.7 % — SIGNIFICANT CHANGE UP (ref 2–14)
NEUTROPHILS # BLD AUTO: 10.66 K/UL — HIGH (ref 1.8–7.4)
NEUTROPHILS NFR BLD AUTO: 88.6 % — HIGH (ref 43–77)
PLATELET # BLD AUTO: 189 K/UL — SIGNIFICANT CHANGE UP (ref 150–400)
POTASSIUM SERPL-MCNC: 3.4 MMOL/L — LOW (ref 3.5–5.3)
POTASSIUM SERPL-SCNC: 3.4 MMOL/L — LOW (ref 3.5–5.3)
PROT SERPL-MCNC: 7.2 G/DL — SIGNIFICANT CHANGE UP (ref 6.6–8.7)
RBC # BLD: 3.88 M/UL — SIGNIFICANT CHANGE UP (ref 3.8–5.2)
RBC # FLD: 11.9 % — SIGNIFICANT CHANGE UP (ref 10.3–14.5)
SODIUM SERPL-SCNC: 134 MMOL/L — LOW (ref 135–145)
WBC # BLD: 12.03 K/UL — HIGH (ref 3.8–10.5)
WBC # FLD AUTO: 12.03 K/UL — HIGH (ref 3.8–10.5)

## 2023-01-10 PROCEDURE — 86901 BLOOD TYPING SEROLOGIC RH(D): CPT

## 2023-01-10 PROCEDURE — 85025 COMPLETE CBC W/AUTO DIFF WBC: CPT

## 2023-01-10 PROCEDURE — 74177 CT ABD & PELVIS W/CONTRAST: CPT | Mod: 26,MA

## 2023-01-10 PROCEDURE — 86850 RBC ANTIBODY SCREEN: CPT

## 2023-01-10 PROCEDURE — 74177 CT ABD & PELVIS W/CONTRAST: CPT | Mod: MA

## 2023-01-10 PROCEDURE — 86900 BLOOD TYPING SEROLOGIC ABO: CPT

## 2023-01-10 PROCEDURE — 80053 COMPREHEN METABOLIC PANEL: CPT

## 2023-01-10 PROCEDURE — 36415 COLL VENOUS BLD VENIPUNCTURE: CPT

## 2023-01-10 PROCEDURE — 96374 THER/PROPH/DIAG INJ IV PUSH: CPT

## 2023-01-10 PROCEDURE — 99284 EMERGENCY DEPT VISIT MOD MDM: CPT | Mod: 25

## 2023-01-10 RX ORDER — ACETAMINOPHEN 500 MG
1000 TABLET ORAL ONCE
Refills: 0 | Status: COMPLETED | OUTPATIENT
Start: 2023-01-10 | End: 2023-01-10

## 2023-01-10 RX ORDER — MINERAL OIL
133 OIL (ML) MISCELLANEOUS ONCE
Refills: 0 | Status: COMPLETED | OUTPATIENT
Start: 2023-01-10 | End: 2023-01-10

## 2023-01-10 RX ADMIN — Medication 133 MILLILITER(S): at 00:41

## 2023-01-10 RX ADMIN — Medication 400 MILLIGRAM(S): at 03:12

## 2023-01-10 NOTE — ED ADULT NURSE NOTE - OBJECTIVE STATEMENT
pt states she has been constipated for about a day, unsure if she had a BM yesterday. pt states she took dulcolax and an enema and then started bleeding rectally and still unable to pass a BM. pt states she has an overall discomfort in her abd and rectum. respirations even and unlabored. pt states she has been taking levaquin for a sinus infection

## 2023-01-10 NOTE — ED ADULT NURSE REASSESSMENT NOTE - NS ED NURSE REASSESS COMMENT FT1
pt states she still has not had a BM. pt c/o generalized abd discomfort. PA to order pain meds. pt awaiting CT scan, pt updated on plan of care. respirations even and unlabored. pt shows no s/s of acute distress at this time. safety precautions maintained.

## 2023-01-10 NOTE — ED ADULT NURSE NOTE - NSIMPLEMENTINTERV_GEN_ALL_ED
Implemented All Universal Safety Interventions:  [place] to call system. Call bell, personal items and telephone within reach. Instruct patient to call for assistance. Room bathroom lighting operational. Non-slip footwear when patient is off stretcher. Physically safe environment: no spills, clutter or unnecessary equipment. Stretcher in lowest position, wheels locked, appropriate side rails in place.

## 2023-01-16 NOTE — PHYSICAL EXAM
[Normal] : well developed, well nourished, in no acute distress For information on Fall & Injury Prevention, visit: [email or web address] OR  [email or web address] OR  https://www.cdc.gov/steadi/patient.html

## 2023-01-19 ENCOUNTER — APPOINTMENT (OUTPATIENT)
Dept: SURGERY | Facility: CLINIC | Age: 78
End: 2023-01-19

## 2023-01-30 ENCOUNTER — APPOINTMENT (OUTPATIENT)
Dept: SURGERY | Facility: CLINIC | Age: 78
End: 2023-01-30
Payer: MEDICARE

## 2023-01-30 VITALS
HEART RATE: 84 BPM | DIASTOLIC BLOOD PRESSURE: 71 MMHG | HEIGHT: 62 IN | OXYGEN SATURATION: 95 % | BODY MASS INDEX: 29.81 KG/M2 | TEMPERATURE: 97.2 F | WEIGHT: 162 LBS | SYSTOLIC BLOOD PRESSURE: 138 MMHG

## 2023-01-30 PROCEDURE — 99213 OFFICE O/P EST LOW 20 MIN: CPT

## 2023-01-30 NOTE — ADDENDUM
[FreeTextEntry1] : This note was written by Ernestina Calvillo, acting as the  for Dr. Mendez. This note accurately reflects the work and decisions made by Dr. Mendez.

## 2023-01-30 NOTE — PHYSICAL EXAM
[Normocephalic] : normocephalic [Atraumatic] : atraumatic [EOMI] : extra ocular movement intact [PERRL] : pupils equal, round and reactive to light [Sclera nonicteric] : sclera nonicteric [Supple] : supple [No Supraclavicular Adenopathy] : no supraclavicular adenopathy [Examined in the supine and seated position] : examined in the supine and seated position [No dominant masses] : no dominant masses in right breast  [No dominant masses] : no dominant masses left breast [No Nipple Retraction] : no left nipple retraction [No Nipple Discharge] : no left nipple discharge [No Axillary Lymphadenopathy] : no left axillary lymphadenopathy [No Edema] : no edema [No Rashes] : no rashes [No Ulceration] : no ulceration [No Cervical Adenopathy] : no cervical adenopathy [Symmetrical] : symmetrical [Grade 1] : Ptosis Grade 1 [de-identified] : Well-healed periareolar incision. Post-surgical and post-radiation changes.

## 2023-01-30 NOTE — ASSESSMENT
[FreeTextEntry1] : 77 year old underwent right breast raul  localized wide lumpectomy with SLNB on 3/30/2022. Final surgical pathology demonstrated the invasive tumor to measure 9 mm and 0/2 lymph nodes. Margins are clear after having additional margins taken. \par \par \par CBE is benign. We reviewed her recent mammogram/US. We discussed the clinical significance of a BIRADS 3 classification and the <2% chance of finding an underlying malignancy. We discussed management options of close surveillance every 6 months for a total of 2 years. If on repeat mammogram there are morphologic changes, biopsy will be recommended. I will see her back after her repeat mammogram for her next CBE and to review the results and discuss surveillance imaging moving forward. \par \par Patient verbalized understanding for all treatment plans discussed. All of her questions were answered to the best of my ability. She was encouraged to call the office if any questions or concerns or come in sooner if needed.\par \par \par \par PLAN\par 1. Right diagnostic mammogram July 2023\par 2. Followup in 6 months after imaging\par 3. Followup w/med onc and rad onc \par \par \par \par

## 2023-01-30 NOTE — HISTORY OF PRESENT ILLNESS
[FreeTextEntry1] : Oncology History:\par 1. Right breast 11:00 2cm FN IDC grade 1 ER  NE 41-50 Zym1why negative\par     -Underwent right breast raul  wide lumpectomy, SLNB on 3/30/2022\par       FINAL PATH: Invasive tumor to measure 9 mm and 0/2 lymph nodes. Margins are clear after having additional margins taken.\par      -Radiation completed 6/28/22\par      -Oncotype 14 \par      -Anastrozole started 7/16/22\par 2. myRISK Genetic Results - No clinically significant mutation identified. VUS BRIP 1 gene\par 3. Right breast calcifications \par      -Repeat MMG July 2023\par \par CARE TEAM\par Med Onc - [facility]\par Rad Onc - Fredy\par PCP - [place]\par GYN - Julian\par \par INTERVAL HISTORY:\par Patient presents for 6 month followup and review of her recent mammogram/US. She denies breast complaints. She is tolerating the the Anastrozole well. \par \par \par HPI: \par Patient initially presented to Dr. Pickett in February 2022 for a newly diagnosed right breast cancer. She had surgery LL lobectomy with Dr Garcia in May 2021 for Lung Cancer. She underwent imaging demonstrating bilateral asymmetries with recommendation of targeted imaging. Left breast asymmetry was thought to be post surgical in nature and recommended for 6 month followup US. The right asymmetry correlated with a 5 mm mass with recommendation of biopsy. Biopsy was performed on 1/12/2022 demonstrating right 11:00 2 cmfn IDC with DCIS grade 1 ER % NE 41-50% Her2 negative ki67 10%.\par \par \par IMAGING:\par 01/11/22 - [facility]: Ultrasound Guided Percutaneous Biopsy of the Right Breast w/ Clip Placement and Post Procedural Diagnostic Right Mammography - FINAL PATHOLOGY: Right breast 11:00, 2 cm from the nipple (coil-shaped marker clip) - Invasive ductal carcinoma, well differentiated. Ductal carcinoma in situ - Solid, Cribriform. Associated calcifications present. The pathology is malignant and is concordant with the imaging findings. Surgical and oncologic consultation and management is recommended. - BIRADS 6: Known Biopsy-Proven Malignancy.\par \par 02/09/22 - [facility]: MRI - IMPRESSION: Biopsy-proven right breast malignancy. No evidence of multifocal or multicentric disease. - BIRADS 6: Known Biopsy-Proven Malignancy.\par \par 01/20/23 - [facility]: Diagnostic Mammogram & Bilateral Breast Ultrasound - IMPRESSION: Right breast post-treatment changes. Probably benign right breast calcifications for which a six-month follow-up right diagnostic mammogram is recommended. No sonographic evidence of malignancy in the right breast. No mammographic or sonographic evidence of malignancy in the left breast. - MAMMOGRAM BIRADS 3: Probably benign. - ULTRASOUND BIRADS 2: Benign.\par \par \par

## 2023-02-07 ENCOUNTER — TRANSCRIPTION ENCOUNTER (OUTPATIENT)
Age: 78
End: 2023-02-07

## 2023-02-07 ENCOUNTER — INPATIENT (INPATIENT)
Facility: HOSPITAL | Age: 78
LOS: 3 days | Discharge: HOME | DRG: 329 | End: 2023-02-11
Attending: STUDENT IN AN ORGANIZED HEALTH CARE EDUCATION/TRAINING PROGRAM | Admitting: STUDENT IN AN ORGANIZED HEALTH CARE EDUCATION/TRAINING PROGRAM
Payer: MEDICARE

## 2023-02-07 VITALS
RESPIRATION RATE: 16 BRPM | TEMPERATURE: 98 F | DIASTOLIC BLOOD PRESSURE: 76 MMHG | OXYGEN SATURATION: 99 % | WEIGHT: 149.91 LBS | SYSTOLIC BLOOD PRESSURE: 147 MMHG | HEIGHT: 64 IN | HEART RATE: 86 BPM

## 2023-02-07 DIAGNOSIS — Z98.890 OTHER SPECIFIED POSTPROCEDURAL STATES: Chronic | ICD-10-CM

## 2023-02-07 DIAGNOSIS — Z90.2 ACQUIRED ABSENCE OF LUNG [PART OF]: Chronic | ICD-10-CM

## 2023-02-07 LAB
ALBUMIN SERPL ELPH-MCNC: 4.7 G/DL — SIGNIFICANT CHANGE UP (ref 3.3–5.2)
ALP SERPL-CCNC: 101 U/L — SIGNIFICANT CHANGE UP (ref 40–120)
ALT FLD-CCNC: 26 U/L — SIGNIFICANT CHANGE UP
ANION GAP SERPL CALC-SCNC: 15 MMOL/L — SIGNIFICANT CHANGE UP (ref 5–17)
APPEARANCE UR: CLEAR — SIGNIFICANT CHANGE UP
AST SERPL-CCNC: 32 U/L — HIGH
BASOPHILS # BLD AUTO: 0.05 K/UL — SIGNIFICANT CHANGE UP (ref 0–0.2)
BASOPHILS NFR BLD AUTO: 0.5 % — SIGNIFICANT CHANGE UP (ref 0–2)
BILIRUB SERPL-MCNC: 0.4 MG/DL — SIGNIFICANT CHANGE UP (ref 0.4–2)
BILIRUB UR-MCNC: NEGATIVE — SIGNIFICANT CHANGE UP
BUN SERPL-MCNC: 16.5 MG/DL — SIGNIFICANT CHANGE UP (ref 8–20)
CALCIUM SERPL-MCNC: 11.1 MG/DL — HIGH (ref 8.4–10.5)
CHLORIDE SERPL-SCNC: 97 MMOL/L — SIGNIFICANT CHANGE UP (ref 96–108)
CO2 SERPL-SCNC: 28 MMOL/L — SIGNIFICANT CHANGE UP (ref 22–29)
COLOR SPEC: YELLOW — SIGNIFICANT CHANGE UP
CREAT SERPL-MCNC: 0.8 MG/DL — SIGNIFICANT CHANGE UP (ref 0.5–1.3)
DIFF PNL FLD: ABNORMAL
EGFR: 77 ML/MIN/1.73M2 — SIGNIFICANT CHANGE UP
EOSINOPHIL # BLD AUTO: 0.02 K/UL — SIGNIFICANT CHANGE UP (ref 0–0.5)
EOSINOPHIL NFR BLD AUTO: 0.2 % — SIGNIFICANT CHANGE UP (ref 0–6)
GLUCOSE SERPL-MCNC: 130 MG/DL — HIGH (ref 70–99)
GLUCOSE UR QL: NEGATIVE MG/DL — SIGNIFICANT CHANGE UP
HCT VFR BLD CALC: 39.1 % — SIGNIFICANT CHANGE UP (ref 34.5–45)
HGB BLD-MCNC: 13.1 G/DL — SIGNIFICANT CHANGE UP (ref 11.5–15.5)
IMM GRANULOCYTES NFR BLD AUTO: 0.4 % — SIGNIFICANT CHANGE UP (ref 0–0.9)
KETONES UR-MCNC: ABNORMAL
LACTATE BLDV-MCNC: 1.3 MMOL/L — SIGNIFICANT CHANGE UP (ref 0.5–2)
LEUKOCYTE ESTERASE UR-ACNC: ABNORMAL
LIDOCAIN IGE QN: 23 U/L — SIGNIFICANT CHANGE UP (ref 22–51)
LYMPHOCYTES # BLD AUTO: 0.81 K/UL — LOW (ref 1–3.3)
LYMPHOCYTES # BLD AUTO: 8.7 % — LOW (ref 13–44)
MCHC RBC-ENTMCNC: 31.3 PG — SIGNIFICANT CHANGE UP (ref 27–34)
MCHC RBC-ENTMCNC: 33.5 GM/DL — SIGNIFICANT CHANGE UP (ref 32–36)
MCV RBC AUTO: 93.5 FL — SIGNIFICANT CHANGE UP (ref 80–100)
MONOCYTES # BLD AUTO: 0.49 K/UL — SIGNIFICANT CHANGE UP (ref 0–0.9)
MONOCYTES NFR BLD AUTO: 5.2 % — SIGNIFICANT CHANGE UP (ref 2–14)
NEUTROPHILS # BLD AUTO: 7.95 K/UL — HIGH (ref 1.8–7.4)
NEUTROPHILS NFR BLD AUTO: 85 % — HIGH (ref 43–77)
NITRITE UR-MCNC: NEGATIVE — SIGNIFICANT CHANGE UP
PH UR: 5 — SIGNIFICANT CHANGE UP (ref 5–8)
PLATELET # BLD AUTO: 258 K/UL — SIGNIFICANT CHANGE UP (ref 150–400)
POTASSIUM SERPL-MCNC: 3.2 MMOL/L — LOW (ref 3.5–5.3)
POTASSIUM SERPL-SCNC: 3.2 MMOL/L — LOW (ref 3.5–5.3)
PROT SERPL-MCNC: 7.5 G/DL — SIGNIFICANT CHANGE UP (ref 6.6–8.7)
PROT UR-MCNC: 15
RBC # BLD: 4.18 M/UL — SIGNIFICANT CHANGE UP (ref 3.8–5.2)
RBC # FLD: 13 % — SIGNIFICANT CHANGE UP (ref 10.3–14.5)
RBC CASTS # UR COMP ASSIST: SIGNIFICANT CHANGE UP /HPF (ref 0–4)
SODIUM SERPL-SCNC: 140 MMOL/L — SIGNIFICANT CHANGE UP (ref 135–145)
SP GR SPEC: 1.01 — SIGNIFICANT CHANGE UP (ref 1.01–1.02)
UROBILINOGEN FLD QL: NEGATIVE MG/DL — SIGNIFICANT CHANGE UP
WBC # BLD: 9.36 K/UL — SIGNIFICANT CHANGE UP (ref 3.8–10.5)
WBC # FLD AUTO: 9.36 K/UL — SIGNIFICANT CHANGE UP (ref 3.8–10.5)
WBC UR QL: SIGNIFICANT CHANGE UP /HPF (ref 0–5)

## 2023-02-07 PROCEDURE — 74177 CT ABD & PELVIS W/CONTRAST: CPT | Mod: 26,MA

## 2023-02-07 PROCEDURE — 99285 EMERGENCY DEPT VISIT HI MDM: CPT

## 2023-02-07 PROCEDURE — 93010 ELECTROCARDIOGRAM REPORT: CPT

## 2023-02-07 RX ORDER — POTASSIUM CHLORIDE 20 MEQ
40 PACKET (EA) ORAL ONCE
Refills: 0 | Status: COMPLETED | OUTPATIENT
Start: 2023-02-07 | End: 2023-02-07

## 2023-02-07 RX ORDER — DIPHENHYDRAMINE HCL 50 MG
50 CAPSULE ORAL ONCE
Refills: 0 | Status: COMPLETED | OUTPATIENT
Start: 2023-02-07 | End: 2023-02-07

## 2023-02-07 RX ORDER — SIMETHICONE 80 MG/1
80 TABLET, CHEWABLE ORAL ONCE
Refills: 0 | Status: COMPLETED | OUTPATIENT
Start: 2023-02-07 | End: 2023-02-07

## 2023-02-07 RX ORDER — POTASSIUM CHLORIDE 20 MEQ
10 PACKET (EA) ORAL ONCE
Refills: 0 | Status: COMPLETED | OUTPATIENT
Start: 2023-02-07 | End: 2023-02-07

## 2023-02-07 RX ORDER — FAMOTIDINE 10 MG/ML
20 INJECTION INTRAVENOUS ONCE
Refills: 0 | Status: COMPLETED | OUTPATIENT
Start: 2023-02-07 | End: 2023-02-07

## 2023-02-07 RX ORDER — SODIUM CHLORIDE 9 MG/ML
2000 INJECTION INTRAMUSCULAR; INTRAVENOUS; SUBCUTANEOUS ONCE
Refills: 0 | Status: COMPLETED | OUTPATIENT
Start: 2023-02-07 | End: 2023-02-07

## 2023-02-07 RX ADMIN — Medication 125 MILLIGRAM(S): at 18:08

## 2023-02-07 RX ADMIN — Medication 40 MILLIEQUIVALENT(S): at 20:00

## 2023-02-07 RX ADMIN — Medication 100 MILLIEQUIVALENT(S): at 20:00

## 2023-02-07 RX ADMIN — SODIUM CHLORIDE 2000 MILLILITER(S): 9 INJECTION INTRAMUSCULAR; INTRAVENOUS; SUBCUTANEOUS at 18:08

## 2023-02-07 RX ADMIN — Medication 100 MILLIEQUIVALENT(S): at 20:55

## 2023-02-07 RX ADMIN — FAMOTIDINE 20 MILLIGRAM(S): 10 INJECTION INTRAVENOUS at 18:08

## 2023-02-07 RX ADMIN — SIMETHICONE 80 MILLIGRAM(S): 80 TABLET, CHEWABLE ORAL at 20:57

## 2023-02-07 RX ADMIN — Medication 50 MILLIGRAM(S): at 18:08

## 2023-02-07 NOTE — ED ADULT NURSE REASSESSMENT NOTE - NS ED NURSE REASSESS COMMENT FT1
Assumed care of pt at 19:15 as stated in report from RONNI dickson. Charting as noted. Patient A&O x4, denies pain/discomfort, denies CP/SOB. Updated on the plan of care. Call bell within reach, bed locked in lowest position. IV site flushed w/ NS. No redness, swelling or pain noted to site. No signs of acute distress noted, safety maintained. Pt remains on CM in SR

## 2023-02-07 NOTE — ED ADULT NURSE NOTE - OBJECTIVE STATEMENT
pt alert an doriented x4 wa shaving colonoscopy and having sudden onset lower abd pain with bloating. pt c/o diahrea but since PO prep. pt has ruash to both arms since drinking the prep. respirations een unlabored. pt ambulatory

## 2023-02-07 NOTE — ED PROVIDER NOTE - OBJECTIVE STATEMENT
78 y/o F with h/o breast ca om oral chemo, lung ca s/p resection and chemo, last RT last year, chronic constipation went for colonoscopy today and they were unable to complete colonoscopy as felt some obstruction and sent for eval. Pt has itchy rash from colonoscopy prep fluid and now has bad cramping but no chest pain, sob.

## 2023-02-07 NOTE — ED PROVIDER NOTE - PROGRESS NOTE DETAILS
Juanjose: Pt received in signout from Dr. Delacruz. Colorectal surgery consulted; pt to be admitted.

## 2023-02-07 NOTE — ED PROVIDER NOTE - BIRTH SEX
Results and recommendations given to pt.  Patient verbalizes understanding and is in agreement with plan as discussed, denies further questions at this time.      Female

## 2023-02-07 NOTE — ED ADULT TRIAGE NOTE - CHIEF COMPLAINT QUOTE
pt BIBA from colonoscopy. md was unable to get into sigmoid colon and wants ct to eval. pt reports lower abdominal cramping

## 2023-02-07 NOTE — ED PROVIDER NOTE - DIFFERENTIAL DIAGNOSIS
allergci reaction to colonoscopy pre fluid, angioedema of abd, sbo, colonic mass Differential Diagnosis

## 2023-02-08 ENCOUNTER — TRANSCRIPTION ENCOUNTER (OUTPATIENT)
Age: 78
End: 2023-02-08

## 2023-02-08 ENCOUNTER — RESULT REVIEW (OUTPATIENT)
Age: 78
End: 2023-02-08

## 2023-02-08 DIAGNOSIS — K63.1 PERFORATION OF INTESTINE (NONTRAUMATIC): ICD-10-CM

## 2023-02-08 LAB
BLD GP AB SCN SERPL QL: SIGNIFICANT CHANGE UP
SARS-COV-2 RNA SPEC QL NAA+PROBE: SIGNIFICANT CHANGE UP

## 2023-02-08 PROCEDURE — 88305 TISSUE EXAM BY PATHOLOGIST: CPT | Mod: 26

## 2023-02-08 PROCEDURE — 88307 TISSUE EXAM BY PATHOLOGIST: CPT | Mod: 26

## 2023-02-08 PROCEDURE — 99223 1ST HOSP IP/OBS HIGH 75: CPT

## 2023-02-08 PROCEDURE — 44204 LAPARO PARTIAL COLECTOMY: CPT

## 2023-02-08 DEVICE — STAPLER CONTOUR CURVED WITH BLUE CART: Type: IMPLANTABLE DEVICE | Status: FUNCTIONAL

## 2023-02-08 DEVICE — STAPLER COVIDIEN TRI-STAPLE 45MM PURPLE RELOAD: Type: IMPLANTABLE DEVICE | Status: FUNCTIONAL

## 2023-02-08 DEVICE — STAPLER COVIDIEN CIRCULAR TRI-STAPLE 28MM PURPLE MEDIUM/THICK: Type: IMPLANTABLE DEVICE | Status: FUNCTIONAL

## 2023-02-08 DEVICE — STAPLER COVIDIEN PURSTRING 65MM: Type: IMPLANTABLE DEVICE | Status: FUNCTIONAL

## 2023-02-08 DEVICE — STAPLER COVIDIEN TRI-STAPLE 60MM PURPLE RELOAD: Type: IMPLANTABLE DEVICE | Status: FUNCTIONAL

## 2023-02-08 DEVICE — STAPLER ECHELON CONTOUR RELOAD GST 6/BX BLU: Type: IMPLANTABLE DEVICE | Status: FUNCTIONAL

## 2023-02-08 DEVICE — SURGICEL 2 X 3": Type: IMPLANTABLE DEVICE | Status: FUNCTIONAL

## 2023-02-08 RX ORDER — HYDROXYCHLOROQUINE SULFATE 200 MG
200 TABLET ORAL DAILY
Refills: 0 | Status: DISCONTINUED | OUTPATIENT
Start: 2023-02-08 | End: 2023-02-11

## 2023-02-08 RX ORDER — ENOXAPARIN SODIUM 100 MG/ML
40 INJECTION SUBCUTANEOUS EVERY 24 HOURS
Refills: 0 | Status: DISCONTINUED | OUTPATIENT
Start: 2023-02-08 | End: 2023-02-11

## 2023-02-08 RX ORDER — HEPARIN SODIUM 5000 [USP'U]/ML
5000 INJECTION INTRAVENOUS; SUBCUTANEOUS EVERY 8 HOURS
Refills: 0 | Status: DISCONTINUED | OUTPATIENT
Start: 2023-02-08 | End: 2023-02-08

## 2023-02-08 RX ORDER — SODIUM CHLORIDE 9 MG/ML
1000 INJECTION, SOLUTION INTRAVENOUS
Refills: 0 | Status: DISCONTINUED | OUTPATIENT
Start: 2023-02-08 | End: 2023-02-08

## 2023-02-08 RX ORDER — FENTANYL CITRATE 50 UG/ML
50 INJECTION INTRAVENOUS
Refills: 0 | Status: DISCONTINUED | OUTPATIENT
Start: 2023-02-08 | End: 2023-02-08

## 2023-02-08 RX ORDER — ACETAMINOPHEN 500 MG
650 TABLET ORAL ONCE
Refills: 0 | Status: COMPLETED | OUTPATIENT
Start: 2023-02-08 | End: 2023-02-08

## 2023-02-08 RX ORDER — PANTOPRAZOLE SODIUM 20 MG/1
40 TABLET, DELAYED RELEASE ORAL EVERY 24 HOURS
Refills: 0 | Status: DISCONTINUED | OUTPATIENT
Start: 2023-02-08 | End: 2023-02-11

## 2023-02-08 RX ORDER — ANASTROZOLE 1 MG/1
1 TABLET ORAL DAILY
Refills: 0 | Status: DISCONTINUED | OUTPATIENT
Start: 2023-02-08 | End: 2023-02-11

## 2023-02-08 RX ORDER — PIPERACILLIN AND TAZOBACTAM 4; .5 G/20ML; G/20ML
3.38 INJECTION, POWDER, LYOPHILIZED, FOR SOLUTION INTRAVENOUS ONCE
Refills: 0 | Status: COMPLETED | OUTPATIENT
Start: 2023-02-08 | End: 2023-02-08

## 2023-02-08 RX ORDER — ONDANSETRON 8 MG/1
4 TABLET, FILM COATED ORAL EVERY 6 HOURS
Refills: 0 | Status: DISCONTINUED | OUTPATIENT
Start: 2023-02-08 | End: 2023-02-08

## 2023-02-08 RX ORDER — ACETAMINOPHEN 500 MG
1000 TABLET ORAL EVERY 6 HOURS
Refills: 0 | Status: COMPLETED | OUTPATIENT
Start: 2023-02-08 | End: 2023-02-09

## 2023-02-08 RX ORDER — LATANOPROST 0.05 MG/ML
1 SOLUTION/ DROPS OPHTHALMIC; TOPICAL AT BEDTIME
Refills: 0 | Status: DISCONTINUED | OUTPATIENT
Start: 2023-02-08 | End: 2023-02-11

## 2023-02-08 RX ORDER — ANASTROZOLE 1 MG/1
1 TABLET ORAL
Qty: 0 | Refills: 0 | DISCHARGE

## 2023-02-08 RX ORDER — ALPRAZOLAM 0.25 MG
0.25 TABLET ORAL
Refills: 0 | Status: DISCONTINUED | OUTPATIENT
Start: 2023-02-08 | End: 2023-02-11

## 2023-02-08 RX ORDER — ONDANSETRON 8 MG/1
4 TABLET, FILM COATED ORAL ONCE
Refills: 0 | Status: DISCONTINUED | OUTPATIENT
Start: 2023-02-08 | End: 2023-02-08

## 2023-02-08 RX ORDER — PIPERACILLIN AND TAZOBACTAM 4; .5 G/20ML; G/20ML
3.38 INJECTION, POWDER, LYOPHILIZED, FOR SOLUTION INTRAVENOUS EVERY 8 HOURS
Refills: 0 | Status: DISCONTINUED | OUTPATIENT
Start: 2023-02-08 | End: 2023-02-08

## 2023-02-08 RX ORDER — SODIUM CHLORIDE 9 MG/ML
1000 INJECTION, SOLUTION INTRAVENOUS
Refills: 0 | Status: DISCONTINUED | OUTPATIENT
Start: 2023-02-08 | End: 2023-02-09

## 2023-02-08 RX ORDER — ATORVASTATIN CALCIUM 80 MG/1
40 TABLET, FILM COATED ORAL AT BEDTIME
Refills: 0 | Status: DISCONTINUED | OUTPATIENT
Start: 2023-02-08 | End: 2023-02-11

## 2023-02-08 RX ORDER — KETOROLAC TROMETHAMINE 30 MG/ML
15 SYRINGE (ML) INJECTION EVERY 6 HOURS
Refills: 0 | Status: DISCONTINUED | OUTPATIENT
Start: 2023-02-08 | End: 2023-02-09

## 2023-02-08 RX ORDER — PIPERACILLIN AND TAZOBACTAM 4; .5 G/20ML; G/20ML
3.38 INJECTION, POWDER, LYOPHILIZED, FOR SOLUTION INTRAVENOUS ONCE
Refills: 0 | Status: DISCONTINUED | OUTPATIENT
Start: 2023-02-08 | End: 2023-02-08

## 2023-02-08 RX ADMIN — Medication 400 MILLIGRAM(S): at 17:57

## 2023-02-08 RX ADMIN — ATORVASTATIN CALCIUM 40 MILLIGRAM(S): 80 TABLET, FILM COATED ORAL at 22:20

## 2023-02-08 RX ADMIN — ANASTROZOLE 1 MILLIGRAM(S): 1 TABLET ORAL at 17:57

## 2023-02-08 RX ADMIN — Medication 400 MILLIGRAM(S): at 13:42

## 2023-02-08 RX ADMIN — Medication 15 MILLIGRAM(S): at 13:43

## 2023-02-08 RX ADMIN — Medication 650 MILLIGRAM(S): at 01:32

## 2023-02-08 RX ADMIN — Medication 15 MILLIGRAM(S): at 17:57

## 2023-02-08 RX ADMIN — PIPERACILLIN AND TAZOBACTAM 200 GRAM(S): 4; .5 INJECTION, POWDER, LYOPHILIZED, FOR SOLUTION INTRAVENOUS at 03:12

## 2023-02-08 RX ADMIN — SODIUM CHLORIDE 75 MILLILITER(S): 9 INJECTION, SOLUTION INTRAVENOUS at 13:43

## 2023-02-08 RX ADMIN — ENOXAPARIN SODIUM 40 MILLIGRAM(S): 100 INJECTION SUBCUTANEOUS at 17:58

## 2023-02-08 RX ADMIN — Medication 1000 MILLIGRAM(S): at 13:42

## 2023-02-08 RX ADMIN — Medication 200 MILLIGRAM(S): at 17:57

## 2023-02-08 NOTE — H&P ADULT - NSHPPHYSICALEXAM_GEN_ALL_CORE
Vital Signs Last 24 Hrs  T(C): 37.3 (07 Feb 2023 22:21), Max: 37.3 (07 Feb 2023 22:21)  T(F): 99.1 (07 Feb 2023 22:21), Max: 99.1 (07 Feb 2023 22:21)  HR: 89 (07 Feb 2023 22:21) (86 - 89)  BP: 141/72 (07 Feb 2023 22:21) (141/72 - 147/76)  BP(mean): --  RR: 18 (07 Feb 2023 22:21) (16 - 18)  SpO2: 95% (07 Feb 2023 22:21) (95% - 99%)    Parameters below as of 07 Feb 2023 22:21  Patient On (Oxygen Delivery Method): room air      PHYSICAL EXAM:   General: NAD, Lying in bed comfortably  Neuro: A+Ox3  Resp: Non labored breathing on RA  GI/Abd: Soft, moderately distended, tender in lower abdomen, most intensely in LLQ, no rebound, voluntary guarding over palpation in LLQ  Musculoskeletal: All 4 extremities moving spontaneously    --------------------------------------------------------------------------------------------

## 2023-02-08 NOTE — H&P ADULT - ATTENDING COMMENTS
Patient seen and examined in the ER. HPI as above. Patient endorse diffuse abdominal pain and discomfort  Abdomen is soft, mildly distended, diffusely tender, +guarding, +rebound. Exam consistent with peritonitis  CT reviewed - Perforated sigmoid colon with moderate to large pneumoperitoneum    Vital Signs Last 24 Hrs  T(C): 37.3 (07 Feb 2023 22:21), Max: 37.3 (07 Feb 2023 22:21)  T(F): 99.1 (07 Feb 2023 22:21), Max: 99.1 (07 Feb 2023 22:21)  HR: 89 (07 Feb 2023 22:21) (86 - 89)  BP: 141/72 (07 Feb 2023 22:21) (141/72 - 147/76)  BP(mean): --  RR: 18 (07 Feb 2023 22:21) (16 - 18)  SpO2: 95% (07 Feb 2023 22:21) (95% - 99%)    Parameters below as of 07 Feb 2023 22:21  Patient On (Oxygen Delivery Method): room air                          13.1   9.36  )-----------( 258      ( 07 Feb 2023 18:10 )             39.1         BLADDER: Within normal limits.  REPRODUCTIVE ORGANS: Uterus and ovaries within normal limits.    BOWEL: Wall thickening in the descending sigmoid colon with an associated 1.5 cm defect in the anterior wall with extraluminal foci of gas compatible with a perforation. Sigmoid diverticulosis. Small hiatal hernia. No bowel obstruction. Appendix is not visualized. No evidence of inflammation in the pericecal region.  PERITONEUM: Small volume pelvic free fluid. Pneumoperitoneum.  VESSELS: Within normal limits.  RETROPERITONEUM/LYMPH NODES: No lymphadenopathy.  ABDOMINAL WALL: Tiny fat-containing umbilical hernia.  BONES: Degenerative changes of the spine. Large superior endplate L3 Schmorl's node versus compression fracture deformity, not significantly changed.    IMPRESSION:  Perforated distal sigmoid colon.      A/P: Colonoscopy complicated by perforated sigmoid colon with moderate to large volume pneumoperitoneum  Findings discussed with the patient at bedside.  Laparoscopic sigmoid resection, possible open, possible ostomy discussed, risks including infection, bleeding, injury to adjacent structures including kidney, ureter, bladder, small bowel, stroke, MI and death were discussed. Patient understands these risks and will proceed with surgery  Post-operative supportive care during recovery

## 2023-02-08 NOTE — CONSULT NOTE ADULT - ASSESSMENT
77y Female with PMH of HTN, HLD, Lung cancer- s/p lobectomy  ,  R breast cancer m dx 3/22, s/p lumpectomy ,GERD , RA  ,   present to ED with abdominal pain and distension following colonoscopy. Patient states she had her colonoscopy around 3pm today and the Gastroenterologist had difficulty passing te scope. Afterwards, she had intense, cramping pain in her abdomen, and increased bloating. The GI doctor was c/f perforation and sent her to the ED, Reports 1x N/V, no fevers or chills. Now s/p sigmoid colon resection .       1. Bowel perforation after colonoscopy ,   s/p sigmoid resection ,   - further mgmt as per colrectal team   - pain mgmt as per primary team   - diet as per primary team   - f/u pathology report     2. Hx of RA - continue home dose Plaquenil     3. HLD - continue home dose ststins     4. Hx of Lung cancer / R breast cancer - s/p lobectomy/   s/p R lumpectomy , hx of radiation .   F/U with Dr Boone , Dr Daniel .   Continue     5. GERD - continue PPI     6. HTN - hold Candesartan / HCTZ today -   consider to restart POD #2 if BP > 120 and renal function normal   May use Hydralazine 10 mg ivp Q 6hrs PRN for SBP > 170     7. dvt prophylaxis - patient is high rissk for dvt -   as per primary team     Thank you for the courtesy of this consult ,   will not follow up daily , please call with any medical issue .

## 2023-02-08 NOTE — H&P ADULT - ASSESSMENT
ASSESSMENT: Patient is a 77y old female with PMH of breast ca, lung ca, RA presenting with bowel perforation after colonoscopy.    PLAN:    - Admit to [facility]S floor  - NPO/IVF  - Zosyn  - Booked emergently for lap sigmoidectomy, possible ostomy, possible open   - pain control  - DVT ppx  - OOB/ambulate  - strict I/Os  - Plan discussed with Attending, Dr. Rubio

## 2023-02-08 NOTE — H&P ADULT - HISTORY OF PRESENT ILLNESS
COLORECTAL SURGERY H&P NOTE    HPI: 77y Female present to ED with abdominal pain and distension following colonoscopy. Patient states she had her colonoscopy around 3pm today and the Gastroenterologist had difficulty passing te scope. Afterwards, she had intense, cramping pain in her abdomen, and increased bloating. The GI doctor was brenda valencia and sent her to the ED, Reports 1x N/V, no fevers or chills.     --------------------------------------------------------------------------------------------

## 2023-02-08 NOTE — BRIEF OPERATIVE NOTE - NSEVIDNCEINFORABSCESSFT_GEN_ALL_CORE
Small amount of murky fluid in the pelvis from perforated sigmoid colon. No jean stool, or purulent fluid

## 2023-02-08 NOTE — CONSULT NOTE ADULT - SUBJECTIVE AND OBJECTIVE BOX
Patient is a 77y old  Female who presents with a chief complaint of bowel perforation .   S/P Laparoscopic resection of sigmoid colon, POD # 0. Medicine consulted for postoperative medical mgmt     CC: abdominal pain , episode of n/v , found to have perforated sigmoid .       HPI: 77y Female with PMH of HTN, HLD, Lung cancer- s/p lobectomy  , R breast cancer m dx 3/22, s/p lumpectomy , GERD , RA  , ,   present to ED with abdominal pain and distension following colonoscopy. Patient states she had her colonoscopy around 3pm today and the Gastroenterologist had difficulty passing te scope. Afterwards, she had intense, cramping pain in her abdomen, and increased bloating. The GI doctor was c/f perforation and sent her to the ED, Reports 1x N/V, no fevers or chills. Now s/p sigmoid colon resection .         PAST MEDICAL & SURGICAL HISTORY:    Hypertension      Hyperlipidemia      OA (osteoarthritis)  bilateral knee      Lung cancer      GERD (gastroesophageal reflux disease)      Obstructive sleep apnea on CPAP      History of colonoscopy      S/P lobectomy of lung  left lower lobe          Social History:  Tobacco - ex smoker , quit 25 yrs ago  ETOH - occasionally   Illicit drug abuse - denies    FAMILY HISTORY:  Family hx of lung cancer (Mother)    FH: HTN (hypertension) (Mother)    FH: heart disease (Father)    FH: aneurysm (Father)        Allergies    No Known Allergies    Intolerances        HOME MEDICATIONS :     ALPRAZOLAM 0.25 MG TABLET: 1 each orally 2 times a day, As Needed (30 Mar 2022 06:32)  anastrozole 1 mg oral tablet: 1 tab(s) orally once a day (2023 02:31)  aspirin 81 mg oral delayed release tablet: 1 tab(s) orally once a day (30 Mar 2022 06:32)  atorvastatin 40 mg oral tablet: 1 tab(s) orally once a day (30 Mar 2022 06:32)  CANDESARTAN-HCTZ 32-25 MG TAB: take 1 tablet by mouth once daily (30 Mar 2022 06:32)  hydroxychloroquine 200 mg oral tablet: 1 tab(s) orally once a day (30 Mar 2022 06:32)  latanoprost 0.005% ophthalmic solution: 1 drop(s) to each affected eye once a day (at bedtime) (30 Mar 2022 06:32)  omeprazole 40 mg oral delayed release capsule: 1 cap(s) orally once a day (30 Mar 2022 06:32)      REVIEW OF SYSTEMS:    As above , all other systems are reviewed and are negative .     MEDICATIONS  (STANDING):  acetaminophen   IVPB .. 1000 milliGRAM(s) IV Intermittent every 6 hours  anastrozole 1 milliGRAM(s) Oral daily  atorvastatin 40 milliGRAM(s) Oral at bedtime  enoxaparin Injectable 40 milliGRAM(s) SubCutaneous every 24 hours  hydroxychloroquine 200 milliGRAM(s) Oral daily  ketorolac   Injectable 15 milliGRAM(s) IV Push every 6 hours  lactated ringers. 1000 milliLiter(s) (75 mL/Hr) IV Continuous <Continuous>  latanoprost 0.005% Ophthalmic Solution 1 Drop(s) Both EYES at bedtime  pantoprazole  Injectable 40 milliGRAM(s) IV Push every 24 hours    MEDICATIONS  (PRN):  ALPRAZolam 0.25 milliGRAM(s) Oral two times a day PRN anxiety      Vital Signs Last 24 Hrs  T(C): 36.7 (2023 13:10), Max: 37.3 (2023 22:21)  T(F): 98 (2023 13:10), Max: 99.1 (2023 22:21)  HR: 99 (2023 13:10) (81 - 99)  BP: 138/63 (2023 13:10) (97/46 - 147/76)  BP(mean): 65 (2023 13:00) (52 - 68)  RR: 18 (2023 13:10) (16 - 22)  SpO2: 97% (2023 13:10) (95% - 99%)    Parameters below as of 2023 13:10  Patient On (Oxygen Delivery Method): room air        PHYSICAL EXAM:    GENERAL: NAD, well-groomed, well-developed  HEAD:  Atraumatic, Normocephalic  EYES: EOMI, PERRLA, conjunctiva and sclera clear  NECK: Supple, No JVD, Normal thyroid  NERVOUS SYSTEM:  Alert & Oriented X4, no focal deficit   CHEST/LUNG: CTA  b/l,  no rales, rhonchi, wheezing, or rubs  HEART: Regular rate and rhythm; No murmurs, rubs, or gallops  ABDOMEN: Soft, appropriatelly tender  Nondistended; Bowel , wound vac+   EXTREMITIES:  2+ Peripheral Pulses, No clubbing, cyanosis, or edema ,   LYMPH: No lymphadenopathy noted  SKIN: No rashes or lesions    LABS:                        13.1   9.36  )-----------( 258      ( 2023 18:10 )             39.1         140  |  97  |  16.5  ----------------------------<  130<H>  3.2<L>   |  28.0  |  0.80    Ca    11.1<H>      2023 18:10    TPro  7.5  /  Alb  4.7  /  TBili  0.4  /  DBili  x   /  AST  32<H>  /  ALT  26  /  AlkPhos  101        Urinalysis Basic - ( 2023 22:30 )    Color: Yellow / Appearance: Clear / S.010 / pH: x  Gluc: x / Ketone: Small  / Bili: Negative / Urobili: Negative mg/dL   Blood: x / Protein: 15 / Nitrite: Negative   Leuk Esterase: Trace / RBC: 0-2 /HPF / WBC 0-2 /HPF   Sq Epi: x / Non Sq Epi: x / Bacteria: x          RADIOLOGY & ADDITIONAL STUDIES:    < from: CT Abdomen and Pelvis w/ IV Cont (23 @ 22:20) >    ACC: [id number] EXAM:  CT ABDOMEN AND PELVIS IC   ORDERED BY: DARIEN DORADO     PROCEDURE DATE:  2023          INTERPRETATION:  CLINICAL INFORMATION: Abdominal pain.    COMPARISON: CT of the abdomen and pelvis from 1/10/2023.    CONTRAST/COMPLICATIONS:  IV Contrast: Omnipaque 350  90 cc administered  Oral Contrast: NONE  Complications: None reported at time of study completion    PROCEDURE:  CT of the Abdomen and Pelvis was performed.  Sagittal and coronal reformats were performed.    FINDINGS:  LOWER CHEST: Bibasilar subsegmental atelectasis. 5 mm nodule in the right   lower lobe (series 3, image 13).    LIVER: Subcentimeter hypodense lesions which are too small for accurate   characterization.  BILE DUCTS: Normal caliber.  GALLBLADDER: Within normal limits.  SPLEEN: Within normal limits.  PANCREAS: Within normal limits.  ADRENALS: Multiple indeterminate left adrenal nodules largest measuring   up to 1.9 x 1.3 cm, not significantly changed.  KIDNEYS/URETERS: Bilateral renal cysts. No hydronephrosis. Nonspecific   bilateral perinephric fat stranding.    BLADDER: Within normal limits.  REPRODUCTIVE ORGANS: Uterus and ovaries within normal limits.    BOWEL: Wall thickening in the descending sigmoid colon with an associated   1.5 cm defect in the anterior wall with extraluminal foci of gas   compatible with a perforation. Sigmoid diverticulosis. Small hiatal   hernia. No bowel obstruction. Appendix is not visualized. No evidence of   inflammation in the pericecal region.  PERITONEUM: Small volume pelvic free fluid. Pneumoperitoneum.  VESSELS: Within normal limits.  RETROPERITONEUM/LYMPH NODES: No lymphadenopathy.  ABDOMINAL WALL: Tiny fat-containing umbilical hernia.  BONES: Degenerative changes of the spine. Large superior endplate L3   Schmorl's node versus compression fracture deformity, not significantly   changed.    IMPRESSION:  Perforated distal sigmoid colon.    Findings were discussed with Dr. Sow 2023 11:35 PM by Dr. Esquivel with   read back confirmation.    --- End of Report ---      < end of copied text >

## 2023-02-08 NOTE — BRIEF OPERATIVE NOTE - OPERATION/FINDINGS
no obvious stool spillage, distal sigmoid perforation noticed rest of colon healthy, adhesions from sigmoid colon to wall and omentum, medial to lateral dissection of sigmoid colon, specimen exteriorized through small midline laparotomy and removed, proximal griselda purple load, distal contour, anastomosis with EEA 28 mm medium/thick, leak test negative, fascia closed, skin staples, prevena No obvious stool spillage, distal sigmoid perforation noticed rest of colon healthy, adhesions from sigmoid colon to wall and omentum, medial to lateral dissection of sigmoid colon, specimen exteriorized through small midline laparotomy and removed, proximal griselda purple load, distal contour, anastomosis with EEA 28 mm medium/thick, leak test negative, fascia closed, skin staples, prevena

## 2023-02-08 NOTE — BRIEF OPERATIVE NOTE - NSICDXBRIEFPROCEDURE_GEN_ALL_CORE_FT
PROCEDURES:  Hand assisted laparoscopic sigmoidectomy 08-Feb-2023 07:37:52  Aman ESPAÑA   PROCEDURES:  Laparoscopic resection of sigmoid colon 08-Feb-2023 07:55:02  Poornima Rubio, transversus abdominis plane, bilateral 08-Feb-2023 07:55:16  Poornima Rubio

## 2023-02-08 NOTE — H&P ADULT - NSHPLABSRESULTS_GEN_ALL_CORE
LABS                 13.1   9.36   )----------(  258       ( 2023 18:10 )               39.1      140    |  97     |  16.5   ----------------------------<  130        ( 2023 18:10 )  3.2     |  28.0   |  0.80     Ca    11.1       ( 2023 18:10 )    TPro  7.5    /  Alb  4.7    /  TBili  0.4    /  DBili  x      /  AST  32     /  ALT  26     /  AlkPhos  101    ( 2023 18:10 )    LIVER FUNCTIONS - ( 2023 18:10 )  Alb: 4.7 g/dL / Pro: 7.5 g/dL / ALK PHOS: 101 U/L / ALT: 26 U/L / AST: 32 U/L / GGT: x               CAPILLARY BLOOD GLUCOSE        Urinalysis Basic - ( 2023 22:30 )    Color: Yellow / Appearance: Clear / S.010 / pH: x  Gluc: x / Ketone: Small  / Bili: Negative / Urobili: Negative mg/dL   Blood: x / Protein: 15 / Nitrite: Negative   Leuk Esterase: Trace / RBC: 0-2 /HPF / WBC 0-2 /HPF   Sq Epi: x / Non Sq Epi: x / Bacteria: x        18:10 - VBG - pH:       | pCO2:       | pO2:       | Lactate: 1.30     --------------------------------------------------------------------------------------------    IMAGING  < from: CT Abdomen and Pelvis w/ IV Cont (23 @ 22:20) >    FINDINGS:  LOWER CHEST: Bibasilar subsegmental atelectasis. 5 mm nodule in the right   lower lobe (series 3, image 13).    LIVER: Subcentimeter hypodense lesions which are too small for accurate   characterization.  BILE DUCTS: Normal caliber.  GALLBLADDER: Within normal limits.  SPLEEN: Within normal limits.  PANCREAS: Within normal limits.  ADRENALS: Multiple indeterminate left adrenal nodules largest measuring   up to 1.9 x 1.3 cm, not significantly changed.  KIDNEYS/URETERS: Bilateral renal cysts. No hydronephrosis. Nonspecific   bilateral perinephric fat stranding.    BLADDER: Within normal limits.  REPRODUCTIVE ORGANS: Uterus and ovaries within normal limits.    BOWEL: Wall thickening in the descending sigmoid colon with an associated   1.5 cm defect in the anterior wall with extraluminal foci of gas   compatible with a perforation. Sigmoid diverticulosis. Small hiatal   hernia. No bowel obstruction. Appendix is not visualized. No evidence of   inflammation in the pericecal region.  PERITONEUM: Small volume pelvic free fluid. Pneumoperitoneum.  VESSELS: Within normal limits.  RETROPERITONEUM/LYMPH NODES: No lymphadenopathy.  ABDOMINAL WALL: Tiny fat-containing umbilical hernia.  BONES: Degenerative changes of the spine. Large superior endplate L3   Schmorl's node versus compression fracture deformity, not significantly   changed.    IMPRESSION:  Perforated distal sigmoid colon.    < end of copied text >

## 2023-02-09 LAB
ANION GAP SERPL CALC-SCNC: 8 MMOL/L — SIGNIFICANT CHANGE UP (ref 5–17)
BUN SERPL-MCNC: 14.4 MG/DL — SIGNIFICANT CHANGE UP (ref 8–20)
CALCIUM SERPL-MCNC: 9.1 MG/DL — SIGNIFICANT CHANGE UP (ref 8.4–10.5)
CHLORIDE SERPL-SCNC: 104 MMOL/L — SIGNIFICANT CHANGE UP (ref 96–108)
CO2 SERPL-SCNC: 25 MMOL/L — SIGNIFICANT CHANGE UP (ref 22–29)
CREAT SERPL-MCNC: 0.68 MG/DL — SIGNIFICANT CHANGE UP (ref 0.5–1.3)
EGFR: 91 ML/MIN/1.73M2 — SIGNIFICANT CHANGE UP
GLUCOSE SERPL-MCNC: 91 MG/DL — SIGNIFICANT CHANGE UP (ref 70–99)
HCT VFR BLD CALC: 28.6 % — LOW (ref 34.5–45)
HCV AB S/CO SERPL IA: 0.03 S/CO — SIGNIFICANT CHANGE UP (ref 0–0.99)
HCV AB SERPL-IMP: SIGNIFICANT CHANGE UP
HGB BLD-MCNC: 9.5 G/DL — LOW (ref 11.5–15.5)
MAGNESIUM SERPL-MCNC: 1.7 MG/DL — SIGNIFICANT CHANGE UP (ref 1.6–2.6)
MCHC RBC-ENTMCNC: 32 PG — SIGNIFICANT CHANGE UP (ref 27–34)
MCHC RBC-ENTMCNC: 33.2 GM/DL — SIGNIFICANT CHANGE UP (ref 32–36)
MCV RBC AUTO: 96.3 FL — SIGNIFICANT CHANGE UP (ref 80–100)
PHOSPHATE SERPL-MCNC: 2.8 MG/DL — SIGNIFICANT CHANGE UP (ref 2.4–4.7)
PLATELET # BLD AUTO: 126 K/UL — LOW (ref 150–400)
POTASSIUM SERPL-MCNC: 3.8 MMOL/L — SIGNIFICANT CHANGE UP (ref 3.5–5.3)
POTASSIUM SERPL-SCNC: 3.8 MMOL/L — SIGNIFICANT CHANGE UP (ref 3.5–5.3)
RBC # BLD: 2.97 M/UL — LOW (ref 3.8–5.2)
RBC # FLD: 13.6 % — SIGNIFICANT CHANGE UP (ref 10.3–14.5)
SODIUM SERPL-SCNC: 137 MMOL/L — SIGNIFICANT CHANGE UP (ref 135–145)
WBC # BLD: 8.14 K/UL — SIGNIFICANT CHANGE UP (ref 3.8–10.5)
WBC # FLD AUTO: 8.14 K/UL — SIGNIFICANT CHANGE UP (ref 3.8–10.5)

## 2023-02-09 RX ORDER — LOSARTAN POTASSIUM 100 MG/1
100 TABLET, FILM COATED ORAL DAILY
Refills: 0 | Status: DISCONTINUED | OUTPATIENT
Start: 2023-02-09 | End: 2023-02-11

## 2023-02-09 RX ORDER — MAGNESIUM SULFATE 500 MG/ML
1 VIAL (ML) INJECTION ONCE
Refills: 0 | Status: COMPLETED | OUTPATIENT
Start: 2023-02-09 | End: 2023-02-09

## 2023-02-09 RX ORDER — SODIUM CHLORIDE 9 MG/ML
1000 INJECTION, SOLUTION INTRAVENOUS
Refills: 0 | Status: DISCONTINUED | OUTPATIENT
Start: 2023-02-09 | End: 2023-02-10

## 2023-02-09 RX ORDER — LOSARTAN POTASSIUM 100 MG/1
100 TABLET, FILM COATED ORAL DAILY
Refills: 0 | Status: DISCONTINUED | OUTPATIENT
Start: 2023-02-09 | End: 2023-02-09

## 2023-02-09 RX ORDER — ASPIRIN/CALCIUM CARB/MAGNESIUM 324 MG
81 TABLET ORAL DAILY
Refills: 0 | Status: DISCONTINUED | OUTPATIENT
Start: 2023-02-09 | End: 2023-02-11

## 2023-02-09 RX ORDER — SODIUM,POTASSIUM PHOSPHATES 278-250MG
1 POWDER IN PACKET (EA) ORAL ONCE
Refills: 0 | Status: COMPLETED | OUTPATIENT
Start: 2023-02-09 | End: 2023-02-09

## 2023-02-09 RX ADMIN — Medication 15 MILLIGRAM(S): at 05:58

## 2023-02-09 RX ADMIN — Medication 15 MILLIGRAM(S): at 00:46

## 2023-02-09 RX ADMIN — Medication 15 MILLIGRAM(S): at 00:25

## 2023-02-09 RX ADMIN — LATANOPROST 1 DROP(S): 0.05 SOLUTION/ DROPS OPHTHALMIC; TOPICAL at 21:51

## 2023-02-09 RX ADMIN — Medication 400 MILLIGRAM(S): at 00:22

## 2023-02-09 RX ADMIN — Medication 400 MILLIGRAM(S): at 05:57

## 2023-02-09 RX ADMIN — LATANOPROST 1 DROP(S): 0.05 SOLUTION/ DROPS OPHTHALMIC; TOPICAL at 00:28

## 2023-02-09 RX ADMIN — Medication 15 MILLIGRAM(S): at 06:10

## 2023-02-09 RX ADMIN — ANASTROZOLE 1 MILLIGRAM(S): 1 TABLET ORAL at 12:28

## 2023-02-09 RX ADMIN — Medication 1000 MILLIGRAM(S): at 00:35

## 2023-02-09 RX ADMIN — PANTOPRAZOLE SODIUM 40 MILLIGRAM(S): 20 TABLET, DELAYED RELEASE ORAL at 12:28

## 2023-02-09 RX ADMIN — Medication 100 GRAM(S): at 12:28

## 2023-02-09 RX ADMIN — Medication 200 MILLIGRAM(S): at 12:28

## 2023-02-09 RX ADMIN — Medication 1000 MILLIGRAM(S): at 06:10

## 2023-02-09 RX ADMIN — ENOXAPARIN SODIUM 40 MILLIGRAM(S): 100 INJECTION SUBCUTANEOUS at 17:22

## 2023-02-09 RX ADMIN — Medication 1 PACKET(S): at 12:28

## 2023-02-09 RX ADMIN — ATORVASTATIN CALCIUM 40 MILLIGRAM(S): 80 TABLET, FILM COATED ORAL at 21:51

## 2023-02-09 NOTE — PHYSICAL THERAPY INITIAL EVALUATION ADULT - PERTINENT HX OF CURRENT PROBLEM, REHAB EVAL
s/p laparoscopic resection of sigmoid colon, block, transverse abdominus plane on 2/8/23 by Dr. Rubio

## 2023-02-09 NOTE — OCCUPATIONAL THERAPY INITIAL EVALUATION ADULT - PERTINENT HX OF CURRENT PROBLEM, REHAB EVAL
As per MD note: 77y Female present to ED with abdominal pain and distension following colonoscopy. Patient states she had her colonoscopy around 3pm today and the Gastroenterologist had difficulty passing te scope. Afterwards, she had intense, cramping pain in her abdomen, and increased bloating. The GI doctor was c/f perforation and sent her to the ED, Reports 1x N/V, no fevers or chills.

## 2023-02-09 NOTE — PATIENT PROFILE ADULT - FALL HARM RISK - HARM RISK INTERVENTIONS

## 2023-02-09 NOTE — OCCUPATIONAL THERAPY INITIAL EVALUATION ADULT - EATING, PREVIOUS LEVEL OF FUNCTION, OT EVAL
Date of Death:  1/12/2022  Time of Death:  0504    Presenting Problem/History of Present Illness    Dementia associated with Parkinson's disease (HCC)      Hospital Course    Per Hospitalist Discharge Summary:     Florinda Knapp is a 89 y.o. female with advanced Parkinson's syndrome.  For the past few weeks she has been less sharp than her baseline, occasionally hallucinating or getting confused when talking to her children.  She was then found in bed ith oxygen sats in the 80s and was sent to the ED.  CT chest showed no acute lung findings, but she had a UTI and was unresponsive in the ED.      She was started on antibiotics and fluids.  She remained minimally responsive.  Her sons came to the bedside nad related that she has been saying for some time that she wishes she could just go to sleep and not wake up.  After discussion, she was changed to comfort-focused care.  Antibiotics and fluids have been stopped.  Hospice was consulted.  She appears comfortable.      She is being discharged to inpatient hospice.     [end of copied of text]     Ms. Knapp was admitted to [facility] on 1/8/2022 for mgmt of acute dementia associated with Parkinson's Disease.       Social History:  Social History     Tobacco Use   • Smoking status: Never Smoker   • Smokeless tobacco: Never Used   Substance Use Topics   • Alcohol use: No         Consults:   Consults     No orders found from 12/10/2021 to 1/9/2022.          Exam confirms with auscultation zero audible heart tones and zero audible respirations. Ms.Louise SOILA Knapp was pronounced dead at 0504 1/12/2022.  MD notified by Patient's RN.     Eddy Molina RN  Clinical House Supervisor  1/12/2022 06:19 JUAN CARLOS Loja, ARLET, MHA, APRN  [facility] Nav[facility]ators  Hospice and Palliative Care Nurse Practitioner  01/14/22  14:17 EST    
independent

## 2023-02-09 NOTE — PROGRESS NOTE ADULT - SUBJECTIVE AND OBJECTIVE BOX
HPI/OVERNIGHT EVENTS: Patient seen and examined at bedside this AM. No overnight events. Pain is controlled. Tolerating diet well.  Denies fever, chills, nausea, vomiting, chest pain, SOB, dizziness or any other concerning symptoms.    Vital Signs Last 24 Hrs  T(C): 36.7 (2023 15:40), Max: 37.2 (2023 12:15)  T(F): 98.1 (2023 15:40), Max: 99 (2023 12:15)  HR: 80 (2023 15:40) (80 - 99)  BP: 106/62 (2023 15:40) (97/46 - 138/63)  BP(mean): 65 (2023 13:00) (52 - 68)  RR: 18 (2023 15:40) (16 - 22)  SpO2: 97% (2023 15:40) (97% - 99%)    Parameters below as of 2023 15:40  Patient On (Oxygen Delivery Method): room air        I&O's Detail    2023 07:01  -  2023 04:39  --------------------------------------------------------  IN:    Lactated Ringers: 225 mL    Lactated Ringers: 375 mL  Total IN: 600 mL    OUT:    Indwelling Catheter - Urethral (mL): 725 mL  Total OUT: 725 mL    Total NET: -125 mL          Constitutional: patient resting comfortably in bed, in no acute distress  HEENT: EOMI, PERRLA, MMM.  Respiratory: Non labored breathing on RA  Cardiovascular: RRR  Gastrointestinal: Abdomen soft, appropriate incisional tenderness, non-distended, no rebound tenderness / guarding. Prevena holding suction. Incision clean, dry, intact.  Musculoskeletal: No joint pain, swelling or deformity; no limitation of movement  Vascular: Extremities warm and well perfused.     LABS:                        13.1   9.36  )-----------( 258      ( 2023 18:10 )             39.1     02-07    140  |  97  |  16.5  ----------------------------<  130<H>  3.2<L>   |  28.0  |  0.80    Ca    11.1<H>      2023 18:10    TPro  7.5  /  Alb  4.7  /  TBili  0.4  /  DBili  x   /  AST  32<H>  /  ALT  26  /  AlkPhos  101        Urinalysis Basic - ( 2023 22:30 )    Color: Yellow / Appearance: Clear / S.010 / pH: x  Gluc: x / Ketone: Small  / Bili: Negative / Urobili: Negative mg/dL   Blood: x / Protein: 15 / Nitrite: Negative   Leuk Esterase: Trace / RBC: 0-2 /HPF / WBC 0-2 /HPF   Sq Epi: x / Non Sq Epi: x / Bacteria: x        MEDICATIONS  (STANDING):  acetaminophen   IVPB .. 1000 milliGRAM(s) IV Intermittent every 6 hours  anastrozole 1 milliGRAM(s) Oral daily  atorvastatin 40 milliGRAM(s) Oral at bedtime  enoxaparin Injectable 40 milliGRAM(s) SubCutaneous every 24 hours  hydroxychloroquine 200 milliGRAM(s) Oral daily  ketorolac   Injectable 15 milliGRAM(s) IV Push every 6 hours  lactated ringers. 1000 milliLiter(s) (75 mL/Hr) IV Continuous <Continuous>  latanoprost 0.005% Ophthalmic Solution 1 Drop(s) Both EYES at bedtime  pantoprazole  Injectable 40 milliGRAM(s) IV Push every 24 hours    MEDICATIONS  (PRN):  ALPRAZolam 0.25 milliGRAM(s) Oral two times a day PRN anxiety      MICRO:   Cultures     STUDIES:   EKG, CXR, U/S, CT, MRI

## 2023-02-09 NOTE — PROGRESS NOTE ADULT - ASSESSMENT
Patient is a 78yo F  s/p laparoscopic sigmoidectomy with primary anastomosis, TAP block POD1.    Plan:   Lov/SCDs   CLD/IVF  Zosyn   SAEs   Lambert out 2/9  multimodal pain control  OOB with assist.

## 2023-02-10 ENCOUNTER — TRANSCRIPTION ENCOUNTER (OUTPATIENT)
Age: 78
End: 2023-02-10

## 2023-02-10 LAB
ANION GAP SERPL CALC-SCNC: 9 MMOL/L — SIGNIFICANT CHANGE UP (ref 5–17)
BUN SERPL-MCNC: 9.9 MG/DL — SIGNIFICANT CHANGE UP (ref 8–20)
CALCIUM SERPL-MCNC: 9.4 MG/DL — SIGNIFICANT CHANGE UP (ref 8.4–10.5)
CHLORIDE SERPL-SCNC: 106 MMOL/L — SIGNIFICANT CHANGE UP (ref 96–108)
CO2 SERPL-SCNC: 27 MMOL/L — SIGNIFICANT CHANGE UP (ref 22–29)
CREAT SERPL-MCNC: 0.65 MG/DL — SIGNIFICANT CHANGE UP (ref 0.5–1.3)
EGFR: 92 ML/MIN/1.73M2 — SIGNIFICANT CHANGE UP
GLUCOSE SERPL-MCNC: 87 MG/DL — SIGNIFICANT CHANGE UP (ref 70–99)
HCT VFR BLD CALC: 27.4 % — LOW (ref 34.5–45)
HGB BLD-MCNC: 9.2 G/DL — LOW (ref 11.5–15.5)
MAGNESIUM SERPL-MCNC: 2.1 MG/DL — SIGNIFICANT CHANGE UP (ref 1.6–2.6)
MCHC RBC-ENTMCNC: 32.5 PG — SIGNIFICANT CHANGE UP (ref 27–34)
MCHC RBC-ENTMCNC: 33.6 GM/DL — SIGNIFICANT CHANGE UP (ref 32–36)
MCV RBC AUTO: 96.8 FL — SIGNIFICANT CHANGE UP (ref 80–100)
PHOSPHATE SERPL-MCNC: 2.6 MG/DL — SIGNIFICANT CHANGE UP (ref 2.4–4.7)
PLATELET # BLD AUTO: 135 K/UL — LOW (ref 150–400)
POTASSIUM SERPL-MCNC: 3.2 MMOL/L — LOW (ref 3.5–5.3)
POTASSIUM SERPL-SCNC: 3.2 MMOL/L — LOW (ref 3.5–5.3)
RBC # BLD: 2.83 M/UL — LOW (ref 3.8–5.2)
RBC # FLD: 13 % — SIGNIFICANT CHANGE UP (ref 10.3–14.5)
SODIUM SERPL-SCNC: 142 MMOL/L — SIGNIFICANT CHANGE UP (ref 135–145)
WBC # BLD: 6.99 K/UL — SIGNIFICANT CHANGE UP (ref 3.8–10.5)
WBC # FLD AUTO: 6.99 K/UL — SIGNIFICANT CHANGE UP (ref 3.8–10.5)

## 2023-02-10 RX ORDER — SODIUM,POTASSIUM PHOSPHATES 278-250MG
1 POWDER IN PACKET (EA) ORAL ONCE
Refills: 0 | Status: COMPLETED | OUTPATIENT
Start: 2023-02-10 | End: 2023-02-10

## 2023-02-10 RX ORDER — POTASSIUM CHLORIDE 20 MEQ
20 PACKET (EA) ORAL ONCE
Refills: 0 | Status: COMPLETED | OUTPATIENT
Start: 2023-02-10 | End: 2023-02-10

## 2023-02-10 RX ORDER — IBUPROFEN 200 MG
400 TABLET ORAL EVERY 6 HOURS
Refills: 0 | Status: DISCONTINUED | OUTPATIENT
Start: 2023-02-10 | End: 2023-02-11

## 2023-02-10 RX ORDER — ACETAMINOPHEN 500 MG
975 TABLET ORAL EVERY 6 HOURS
Refills: 0 | Status: DISCONTINUED | OUTPATIENT
Start: 2023-02-10 | End: 2023-02-11

## 2023-02-10 RX ADMIN — ANASTROZOLE 1 MILLIGRAM(S): 1 TABLET ORAL at 12:40

## 2023-02-10 RX ADMIN — Medication 975 MILLIGRAM(S): at 20:07

## 2023-02-10 RX ADMIN — Medication 20 MILLIEQUIVALENT(S): at 12:40

## 2023-02-10 RX ADMIN — PANTOPRAZOLE SODIUM 40 MILLIGRAM(S): 20 TABLET, DELAYED RELEASE ORAL at 12:43

## 2023-02-10 RX ADMIN — Medication 200 MILLIGRAM(S): at 12:40

## 2023-02-10 RX ADMIN — ENOXAPARIN SODIUM 40 MILLIGRAM(S): 100 INJECTION SUBCUTANEOUS at 17:34

## 2023-02-10 RX ADMIN — Medication 975 MILLIGRAM(S): at 21:07

## 2023-02-10 RX ADMIN — Medication 81 MILLIGRAM(S): at 12:40

## 2023-02-10 RX ADMIN — Medication 1 TABLET(S): at 12:40

## 2023-02-10 RX ADMIN — LATANOPROST 1 DROP(S): 0.05 SOLUTION/ DROPS OPHTHALMIC; TOPICAL at 22:02

## 2023-02-10 RX ADMIN — LOSARTAN POTASSIUM 100 MILLIGRAM(S): 100 TABLET, FILM COATED ORAL at 06:21

## 2023-02-10 RX ADMIN — ATORVASTATIN CALCIUM 40 MILLIGRAM(S): 80 TABLET, FILM COATED ORAL at 22:02

## 2023-02-10 NOTE — CDI QUERY NOTE - NSCDIOTHERTXTBX_GEN_ALL_CORE_HH
Discharge Note Provider [Charted Location: [facility] [id number]] [Authored: 10-Feb-2023 10:47]  Hospital Course	  77F presenting with abdominal pain and distension with c/f bowel perforation after colonoscopy. CT with massive free air and 1.5cm defect in anterior sigmoid colon. Patient admitted and emergently taken to OR for lap-assisted sigmoidectomy with primary anastomosis. Patient tolerated the procedure well, without complication. Patient remained hemodynamically stable in the PACU and transferred to the surgical floor.        Bowel perforation after colonoscopy was documented, please clarify if this was a complication or not.  A.	bowel perforation likely a complication after colonoscopy  B.	bowel perforation was not a complication after colonoscopy  C.	Other, please specify  D.	Not clinically significant

## 2023-02-10 NOTE — DISCHARGE NOTE PROVIDER - NSDCCPTREATMENT_GEN_ALL_CORE_FT
PRINCIPAL PROCEDURE  Procedure: Hand assisted laparoscopic sigmoidectomy  Findings and Treatment:

## 2023-02-10 NOTE — DISCHARGE NOTE PROVIDER - NSDCMRMEDTOKEN_GEN_ALL_CORE_FT
ALPRAZOLAM 0.25 MG TABLET: 1 each orally 2 times a day, As Needed  anastrozole 1 mg oral tablet: 1 tab(s) orally once a day  aspirin 81 mg oral delayed release tablet: 1 tab(s) orally once a day  atorvastatin 40 mg oral tablet: 1 tab(s) orally once a day  CANDESARTAN-HCTZ 32-25 MG TAB: take 1 tablet by mouth once daily  hydroxychloroquine 200 mg oral tablet: 1 tab(s) orally once a day  latanoprost 0.005% ophthalmic solution: 1 drop(s) to each affected eye once a day (at bedtime)  omeprazole 40 mg oral delayed release capsule: 1 cap(s) orally once a day   acetaminophen 325 mg oral tablet: 3 tab(s) orally every 6 hours  ALPRAZOLAM 0.25 MG TABLET: 1 each orally 2 times a day, As Needed  anastrozole 1 mg oral tablet: 1 tab(s) orally once a day  aspirin 81 mg oral delayed release tablet: 1 tab(s) orally once a day  atorvastatin 40 mg oral tablet: 1 tab(s) orally once a day  CANDESARTAN-HCTZ 32-25 MG TAB: take 1 tablet by mouth once daily  hydroxychloroquine 200 mg oral tablet: 1 tab(s) orally once a day  ibuprofen 400 mg oral tablet: 1 tab(s) orally every 6 hours, As needed, Mild Pain (1 - 3)  latanoprost 0.005% ophthalmic solution: 1 drop(s) to each affected eye once a day (at bedtime)  omeprazole 40 mg oral delayed release capsule: 1 cap(s) orally once a day

## 2023-02-10 NOTE — DISCHARGE NOTE PROVIDER - NSDCQMCOGNITION_NEU_ALL_CORE
PT DAILY TREATMENT NOTE    Patient Name: Nelson Lees  Date: 2022  : 1954  [x]  Patient  Verified  Payor: Arnie Gilbert / Plan: [address] / Product Type: Managed Care Medicare /    In Time: 11:31  Out Time: 12:23  Total Treatment Time (min): 52  Total Timed Codes (min): 52  1:1 Treatment Time ( [address] only): 45   Visit #:     Treatment Dx: Other low back pain [M54.59]  Myalgia, other site [M79.18]    SUBJECTIVE  Pre-Treatment Pain Level (0-10 scale): 0    Any medication changes, allergies to medications, adverse drug reactions, diagnosis change, or new procedure performed?: [x] No    [] Yes (see summary sheet for update)    Subjective Functional Status/Changes:  [] No changes reported  Patient states overall she is feeling about 80% improvement since being in physical therapy. Patient states she has been painfree for the past week, but she does still c/o \"popping\" along her right lower back/posterior hip region that tends to mostly occur during bending forward and when rotating her trunk. Patient states she does also occasionally get \"muscle spasms\" along this region too, but she states they have happened much less often since being in skilled PT. She also states she feels like her right leg is weaker than her left leg, she's noticed this during exercises.     OBJECTIVE    33 min Therapeutic Exercise:  [x] See flow sheet   Rationale: increase ROM, increase strength, and decrease pain to assist in improving patient's ability to perform functional activities and ADLs    12 min Therapeutic Activity:  [x] See flow sheet   Rationale: increase ROM, increase strength, and improve coordination to assist in improving patient's ability to perform functional activities and ADLs        With   [x] TE   [] TA   [] neuro   [] other: Patient Education: [x] Review HEP    [] Progressed/Changed HEP based on:   [] positioning   [] body mechanics   [] transfers   [] heat/ice application    [] other:      Other Objective/Functional Measures: N/A     Pain Level (0-10 scale) Post Treatment: 0    ASSESSMENT/Changes in Function:  Patient responded well to today's treatment without any adverse reactions. Patient has participated in 8 visits of skilled PT at this time. She is making steady progress towards goals, including vast improvement in trunk/lumbar AROM, LE strength, and is now with relatively no pain for the most part. She does, however, report \"occasional muscle spasms\" as well as intermittent \"popping\" along her right lower back/posterior hip region, that she does feel is reducing with the assistance of skilled PT exercises. Patient also still has weakened glutes bilateral as well as decreased core stabilization, but this should continue to improve with further skilled guidance in lumbopelvic stabilization exercises. Function continues to gradually improve as does patient's satisfaction, as shown by increasing FOTO score. Patient would continue to benefit from further skilled PT 2x/week x 4 weeks to further improve ROM, LE strength, core stabilization, posture, and overall function, while reducing pain. Patient will continue to benefit from skilled PT services to further modify and progress therapeutic interventions, address functional mobility deficits, address ROM deficits, address strength deficits, analyze and address soft tissue restrictions, analyze and cue movement patterns, analyze and modify body mechanics/ergonomics, assess and modify postural abnormalities, and instruct in home and community integration to attain remaining goals. [x]  See Plan of Care  [x]  See Progress Note/Recertification  []  See Discharge Summary         Progress Towards Goals/Updated Goals:    Short Term Goals: To be accomplished in 3 weeks:  1. Patient will be independent and compliant with HEP to progress toward goals and restore functional mobility.    Eval Status: issued at eval  Current: Patient reports daily compliance with her HEP.   2/16/22, met     2. Patient will improve pain in low back to 5/10  to improve activity tolerance and restore prior level of function. Eval Status: 10/10 at worst  Current: 0/10 pain over the last week     2/16/22, MET     3. Pt will have painfree lumbar AROM WFL in all planes to aid in functional mechanics for ambulation/ADLs. Eval Status:   ROM % AROM Comments:pain, area   Forward flexion 40-60 11 cm     Extension 20-30 50%     SideBend right 20-30 51 cm     SideBend left 20-30 52 cm     Rotation right 5-10 50%     Rotation left 5-10 50%     Current: WNL except flexion to ankles (5cm), though c/o \"popping\" along right lower back region during flexion and extension; 0/10 pain for all planes    2/16/22, in progress           4. Patient will improve 5xSTS to 10 seconds or less without use of UE in order to demonstrate reduced risk for falls.               Eval Status: 16 seconds with use of UE on thighs for support             Current: 11 seconds without use of UE's    2/16/22, in progress     Long Term Goals: To be accomplished in 6 weeks:  1. Patient will improve FOTO score by 7 points to improve functional tolerance for ambulation and ADLs. Eval Status: FOTO 54  Current: 60     2/3/22, in progress  FOTO score = an established functional score where 100 = no disability     2.   Pt will have 5/5 bilat LE and core strength to return to goals of ambulation and ADLs.   Eval Status:     Left(0-5) Right (0-5) N/T   Hip Flexion (L1,2) 4 4 []????????    Knee Extension (L3,4) 5 5 []????????    Ankle Dorsiflexion (L4) 4+ 4+ []????????    Great Toe Extension (L5)     [x]????????    Ankle Plantarflexion (S1) 4+ 4+ []????????    Knee Flexion (S1,2) 4+ 4+ []????????    Abdominals 3+   []????????    Gluteus Bakari (sidelying) 4 4- []????????    Hip Abduction 4 4- []????????    Current: Grossly 5/5 except bilateral hip flexion 4+/5, left hip abduction 4+/5, right hip abduction 4-/5     2/16/22, in progress     3.   Patient will improve single limb stance time to at least 20 seconds on each leg in order to demonstrate reduced risk for falls.               Eval Status: Without UE support: Left = 7 seconds; Right = 11 seconds     Current: SLS 15 sec each     2/10/22, progressing           4. Patient will report pain no greater than 1-2/10 with stair negotiation in order to facilitate her ability to climb stairs to her bedroom.               Eval Status: Pain increased with all stair negotiation; worst pain 10/10   Current: patient reports no pain since last week with ADL, 2/9/22, MET        PLAN  []  Upgrade Activities as Tolerated     [x]  Continue Plan of Care  []  Update Interventions per Flow Sheet       []  Discharge Due To:_  []  Other:_      Sol Johnson.  Nahid, PT, DPT, ATR  2/16/2022 8:08 AM    Future Appointments   Date Time Provider Aniya Caitlyn   2/16/2022 11:30 AM [facility]   2/18/2022 10:45 AM [facility] PT LIVE [facility]   2/22/2022  7:45 AM Jane Diane PT [facility] THE [facility]   3/1/2022 10:00 AM Jane Diane PT [facility] THE [facility] OF [facility]   3/4/2022 11:30 AM Aissatou Betancourt, PT, DPT [facility] THE [facility]   3/9/2022  9:00 AM MELANIE Newberry BS AMB No difficulties

## 2023-02-10 NOTE — CDI QUERY NOTE - NSCDINOTEQUERY_GEN_A_CORE
Complication
81M with Right Femoral Neck Hip Fracture    -pain control  -NWB  -Bedrest  -FU Labs  -Needs clearance for OR  -plan for OR today with Dr Sheffield for R VERONA vs Hemiarthroplasty  -NPO except meds  -hold all anticoagulation   -IVF while NPO  -Will discuss with Dr. Sheffield

## 2023-02-10 NOTE — DISCHARGE NOTE PROVIDER - HOSPITAL COURSE
77F presenting with abdominal pain and distension with c/f bowel perforation after colonoscopy. CT with massive free air and 1.5cm defect in anterior sigmoid colon. Patient admitted and emergently taken to OR for lap-assisted sigmoidectomy with primary anastomosis. Patient tolerated the procedure well, without complication. Patient remained hemodynamically stable in the PACU and transferred to the surgical floor. Diet was restarted and advanced as tolerated and with return of bowel function. Pain control was transitioned from IV to PO pain meds. At this time, patient is currently ambulating, voiding, tolerating a regular diet. Pain well controlled on PO pain meds. Patient has been deemed stable for discharge home with follow up as an outpatient.

## 2023-02-10 NOTE — DISCHARGE NOTE PROVIDER - NSDCFUSCHEDAPPT_GEN_ALL_CORE_FT
Clarence Boone  [facility]  [facility]  Scheduled Appointment: 04/10/2023    Vladimir Comer  [facility]  [address]  Scheduled Appointment: 05/10/2023

## 2023-02-10 NOTE — DISCHARGE NOTE PROVIDER - NSDCFUADDINST_GEN_ALL_CORE_FT
WOUND CARE:  Please keep incisions clean and dry. Please do not Scrub or rub incisions. Do not use lotion or powder on incisions.   BATHING: You may shower and/or sponge bathe. You may use warm soapy water in the shower and rinse, pat dry.  ACTIVITY: No heavy lifting or straining. Otherwise, you may return to your usual level of physical activity.   DIET: Return to your usual diet.  PAIN: You may wish to continue taking either Tylenol or Ibuprofen every 6 hours alternating for the next 24 hours and then start taking either as needed for any additional pain.  NOTIFY YOUR SURGEON IF YOU HAVE: any bleeding that does not stop, any pus draining from your wound(s), any fever (over 100.4 F) persistent nausea/vomiting, or if your pain is not controlled on your discharge pain medications, unable to urinate.  Please follow up with your primary care physician in one week regarding your hospitalization, bring copies of your discharge paperwork.  Please follow up with your surgeon, Dr. Rubio

## 2023-02-10 NOTE — DISCHARGE NOTE PROVIDER - NSDCCPCAREPLAN_GEN_ALL_CORE_FT
PRINCIPAL DISCHARGE DIAGNOSIS  Diagnosis: Perforated sigmoid colon  Assessment and Plan of Treatment:

## 2023-02-10 NOTE — PROGRESS NOTE ADULT - SUBJECTIVE AND OBJECTIVE BOX
Subjective: Patient seen and examined at bedside. CHRIS ESTRADA. Reports abdominal soreness. No bowel function at this time however feels like she has to go soon. Lambert removed 2/9 and passed TOV. Denies nausea, vomiting, chest pain, sob.       MEDICATIONS  (STANDING):  anastrozole 1 milliGRAM(s) Oral daily  aspirin enteric coated 81 milliGRAM(s) Oral daily  atorvastatin 40 milliGRAM(s) Oral at bedtime  enoxaparin Injectable 40 milliGRAM(s) SubCutaneous every 24 hours  hydrochlorothiazide 25 milliGRAM(s) Oral daily  hydroxychloroquine 200 milliGRAM(s) Oral daily  lactated ringers. 1000 milliLiter(s) (40 mL/Hr) IV Continuous <Continuous>  latanoprost 0.005% Ophthalmic Solution 1 Drop(s) Both EYES at bedtime  losartan 100 milliGRAM(s) Oral daily  pantoprazole  Injectable 40 milliGRAM(s) IV Push every 24 hours    MEDICATIONS  (PRN):  ALPRAZolam 0.25 milliGRAM(s) Oral two times a day PRN anxiety      No Known Allergies        Objective:     ICU Vital Signs Last 24 Hrs  T(C): 36.8 (09 Feb 2023 22:00), Max: 37.2 (09 Feb 2023 15:29)  T(F): 98.3 (09 Feb 2023 22:00), Max: 99 (09 Feb 2023 15:29)  HR: 97 (09 Feb 2023 22:00) (78 - 98)  BP: 149/77 (09 Feb 2023 22:00) (101/53 - 149/77)  BP(mean): 101 (09 Feb 2023 22:00) (101 - 101)  ABP: --  ABP(mean): --  RR: 18 (09 Feb 2023 22:00) (18 - 18)  SpO2: 97% (09 Feb 2023 22:00) (95% - 97%)    O2 Parameters below as of 09 Feb 2023 22:00  Patient On (Oxygen Delivery Method): room air            I&O's Detail    08 Feb 2023 07:01  -  09 Feb 2023 07:00  --------------------------------------------------------  IN:    Lactated Ringers: 225 mL    Lactated Ringers: 375 mL  Total IN: 600 mL    OUT:    Indwelling Catheter - Urethral (mL): 725 mL  Total OUT: 725 mL    Total NET: -125 mL          Physical Exam:  General: NAD. Lying comfortably in bed.   HEENT: NCAT. Neck supple. EOMI.   Respiratory: Non labored breathing.   Cardio: RRR  Abdomen: Soft, midly-tender throughout, mildly-distended. No guarding or rebound. Incisions c/d/i with minimal kavon-incisional ecchymosis   Extremities: No clubbing or cyanosis.   Neuro: A&O x 3. CNs II-XII grossly intact.                           9.5    8.14  )-----------( 126      ( 09 Feb 2023 05:55 )             28.6       02-09    137  |  104  |  14.4  ----------------------------<  91  3.8   |  25.0  |  0.68    Ca    9.1      09 Feb 2023 05:55  Phos  2.8     02-09  Mg     1.7     02-09

## 2023-02-10 NOTE — CHART NOTE - NSCHARTNOTEFT_GEN_A_CORE
POST-OP NOTE    STEPHEN MACK | [id number] | [facility] PACU [id number]    Procedure: s/p laparoscopic sigmoidectomy with primary anastomosis, TAP block     Subjective: Patient seen and examined in the PACU awaiting bed availibility. Reports abdomen is sore and that she feels her allies coming on as she normally takes singulare daily. Previna holding suction. Tolerating clears. Denies chest pain, sob, nausea, vomiting, diarrhea. No flatus or BM a this time.     Vital Signs Last 24 Hrs  T(C): 36.3 (08 Feb 2023 11:00), Max: 37.3 (07 Feb 2023 22:21)  T(F): 97.4 (08 Feb 2023 11:00), Max: 99.1 (07 Feb 2023 22:21)  HR: 94 (08 Feb 2023 11:00) (81 - 95)  BP: 110/47 (08 Feb 2023 11:00) (97/46 - 147/76)  BP(mean): 62 (08 Feb 2023 11:00) (52 - 68)  RR: 19 (08 Feb 2023 11:00) (16 - 22)  SpO2: 97% (08 Feb 2023 11:00) (95% - 99%)    Parameters below as of 08 Feb 2023 11:00  Patient On (Oxygen Delivery Method): room air      I&O's Summary    08 Feb 2023 07:01  -  08 Feb 2023 11:50  --------------------------------------------------------  IN: 300 mL / OUT: 460 mL / NET: -160 mL                            13.1   9.36  )-----------( 258      ( 07 Feb 2023 18:10 )             39.1     02-07    140  |  97  |  16.5  ----------------------------<  130<H>  3.2<L>   |  28.0  |  0.80    Ca    11.1<H>      07 Feb 2023 18:10    TPro  7.5  /  Alb  4.7  /  TBili  0.4  /  DBili  x   /  AST  32<H>  /  ALT  26  /  AlkPhos  101  02-07       PHYSICAL EXAM:  Gen: NAD  Resp: breathing easily, no stridor  CV: RRR  Abdomen: soft, kavon incisonal tenderness nondistended, no rebound or guarding  Skin: Incision c/d/i. Normal color, no rashes or lesions      PLAN:  Awaiting bed on floor  Lov/SCDs   CLD/IVF  Zosyn   SAEs   Lambert out 2/9  multimodal pain control  OOB with assist
Please note, I was present and rounded on this patient throughout the hospitalization and upon further review of the EMR the following diagnosis exists:    A.	bowel perforation likely a complication after colonoscopy

## 2023-02-10 NOTE — DISCHARGE NOTE PROVIDER - CARE PROVIDER_API CALL
Poornima Rubio)  Surgery  [address] [address]  Phone: [phone]  Fax: [phone]  Follow Up Time: 2 weeks

## 2023-02-10 NOTE — PROGRESS NOTE ADULT - ASSESSMENT
Patient is a 76yo F POD2 laparoscopic sigmoidectomy with primary anastomosis, TAP block for perforated sigmoid colon 2/2 colonoscopy injury. Patient recovering appropriately. Lambert out 2/9, passed TOV. Ambulating and pain well controlled.     Plan:   Lov/SCDs   CLD/IVF - advance to low res when bowel function returns   Zosyn   SAEs   multimodal pain control  OOB with assist  No PT/OT needs  Continue home meds as prescribed

## 2023-02-11 ENCOUNTER — TRANSCRIPTION ENCOUNTER (OUTPATIENT)
Age: 78
End: 2023-02-11

## 2023-02-11 VITALS
RESPIRATION RATE: 18 BRPM | DIASTOLIC BLOOD PRESSURE: 68 MMHG | TEMPERATURE: 98 F | SYSTOLIC BLOOD PRESSURE: 166 MMHG | HEART RATE: 69 BPM | OXYGEN SATURATION: 97 %

## 2023-02-11 LAB
ANION GAP SERPL CALC-SCNC: 10 MMOL/L — SIGNIFICANT CHANGE UP (ref 5–17)
BUN SERPL-MCNC: 11.1 MG/DL — SIGNIFICANT CHANGE UP (ref 8–20)
CALCIUM SERPL-MCNC: 9.6 MG/DL — SIGNIFICANT CHANGE UP (ref 8.4–10.5)
CHLORIDE SERPL-SCNC: 101 MMOL/L — SIGNIFICANT CHANGE UP (ref 96–108)
CO2 SERPL-SCNC: 29 MMOL/L — SIGNIFICANT CHANGE UP (ref 22–29)
CREAT SERPL-MCNC: 0.67 MG/DL — SIGNIFICANT CHANGE UP (ref 0.5–1.3)
EGFR: 91 ML/MIN/1.73M2 — SIGNIFICANT CHANGE UP
GLUCOSE SERPL-MCNC: 107 MG/DL — HIGH (ref 70–99)
HCT VFR BLD CALC: 30.9 % — LOW (ref 34.5–45)
HGB BLD-MCNC: 10.3 G/DL — LOW (ref 11.5–15.5)
MAGNESIUM SERPL-MCNC: 2 MG/DL — SIGNIFICANT CHANGE UP (ref 1.6–2.6)
MCHC RBC-ENTMCNC: 31.5 PG — SIGNIFICANT CHANGE UP (ref 27–34)
MCHC RBC-ENTMCNC: 33.3 GM/DL — SIGNIFICANT CHANGE UP (ref 32–36)
MCV RBC AUTO: 94.5 FL — SIGNIFICANT CHANGE UP (ref 80–100)
PHOSPHATE SERPL-MCNC: 3.3 MG/DL — SIGNIFICANT CHANGE UP (ref 2.4–4.7)
PLATELET # BLD AUTO: 164 K/UL — SIGNIFICANT CHANGE UP (ref 150–400)
POTASSIUM SERPL-MCNC: 3.3 MMOL/L — LOW (ref 3.5–5.3)
POTASSIUM SERPL-SCNC: 3.3 MMOL/L — LOW (ref 3.5–5.3)
RBC # BLD: 3.27 M/UL — LOW (ref 3.8–5.2)
RBC # FLD: 12.8 % — SIGNIFICANT CHANGE UP (ref 10.3–14.5)
SODIUM SERPL-SCNC: 140 MMOL/L — SIGNIFICANT CHANGE UP (ref 135–145)
WBC # BLD: 6.6 K/UL — SIGNIFICANT CHANGE UP (ref 3.8–10.5)
WBC # FLD AUTO: 6.6 K/UL — SIGNIFICANT CHANGE UP (ref 3.8–10.5)

## 2023-02-11 PROCEDURE — 80053 COMPREHEN METABOLIC PANEL: CPT

## 2023-02-11 PROCEDURE — 80048 BASIC METABOLIC PNL TOTAL CA: CPT

## 2023-02-11 PROCEDURE — 96376 TX/PRO/DX INJ SAME DRUG ADON: CPT

## 2023-02-11 PROCEDURE — 99285 EMERGENCY DEPT VISIT HI MDM: CPT

## 2023-02-11 PROCEDURE — 83690 ASSAY OF LIPASE: CPT

## 2023-02-11 PROCEDURE — 85025 COMPLETE CBC W/AUTO DIFF WBC: CPT

## 2023-02-11 PROCEDURE — 88305 TISSUE EXAM BY PATHOLOGIST: CPT

## 2023-02-11 PROCEDURE — 81001 URINALYSIS AUTO W/SCOPE: CPT

## 2023-02-11 PROCEDURE — 88307 TISSUE EXAM BY PATHOLOGIST: CPT

## 2023-02-11 PROCEDURE — 85027 COMPLETE CBC AUTOMATED: CPT

## 2023-02-11 PROCEDURE — 84100 ASSAY OF PHOSPHORUS: CPT

## 2023-02-11 PROCEDURE — 86850 RBC ANTIBODY SCREEN: CPT

## 2023-02-11 PROCEDURE — 86803 HEPATITIS C AB TEST: CPT

## 2023-02-11 PROCEDURE — 96375 TX/PRO/DX INJ NEW DRUG ADDON: CPT

## 2023-02-11 PROCEDURE — 83605 ASSAY OF LACTIC ACID: CPT

## 2023-02-11 PROCEDURE — 86900 BLOOD TYPING SEROLOGIC ABO: CPT

## 2023-02-11 PROCEDURE — 74177 CT ABD & PELVIS W/CONTRAST: CPT | Mod: MA

## 2023-02-11 PROCEDURE — C1889: CPT

## 2023-02-11 PROCEDURE — 96374 THER/PROPH/DIAG INJ IV PUSH: CPT

## 2023-02-11 PROCEDURE — 93005 ELECTROCARDIOGRAM TRACING: CPT

## 2023-02-11 PROCEDURE — U0003: CPT

## 2023-02-11 PROCEDURE — 97167 OT EVAL HIGH COMPLEX 60 MIN: CPT

## 2023-02-11 PROCEDURE — U0005: CPT

## 2023-02-11 PROCEDURE — 36415 COLL VENOUS BLD VENIPUNCTURE: CPT

## 2023-02-11 PROCEDURE — 97163 PT EVAL HIGH COMPLEX 45 MIN: CPT

## 2023-02-11 PROCEDURE — 86901 BLOOD TYPING SEROLOGIC RH(D): CPT

## 2023-02-11 PROCEDURE — 83735 ASSAY OF MAGNESIUM: CPT

## 2023-02-11 RX ORDER — POLYETHYLENE GLYCOL 3350 17 G/17G
17 POWDER, FOR SOLUTION ORAL
Qty: 0 | Refills: 0 | DISCHARGE
Start: 2023-02-11

## 2023-02-11 RX ORDER — ACETAMINOPHEN 500 MG
3 TABLET ORAL
Qty: 0 | Refills: 0 | DISCHARGE
Start: 2023-02-11

## 2023-02-11 RX ORDER — POLYETHYLENE GLYCOL 3350 17 G/17G
17 POWDER, FOR SOLUTION ORAL AT BEDTIME
Refills: 0 | Status: DISCONTINUED | OUTPATIENT
Start: 2023-02-11 | End: 2023-02-11

## 2023-02-11 RX ORDER — POTASSIUM CHLORIDE 20 MEQ
40 PACKET (EA) ORAL EVERY 4 HOURS
Refills: 0 | Status: COMPLETED | OUTPATIENT
Start: 2023-02-11 | End: 2023-02-11

## 2023-02-11 RX ORDER — IBUPROFEN 200 MG
1 TABLET ORAL
Qty: 0 | Refills: 0 | DISCHARGE
Start: 2023-02-11

## 2023-02-11 RX ORDER — INFLUENZA VIRUS VACCINE 15; 15; 15; 15 UG/.5ML; UG/.5ML; UG/.5ML; UG/.5ML
0.7 SUSPENSION INTRAMUSCULAR ONCE
Refills: 0 | Status: COMPLETED | OUTPATIENT
Start: 2023-02-11 | End: 2023-02-11

## 2023-02-11 RX ADMIN — PANTOPRAZOLE SODIUM 40 MILLIGRAM(S): 20 TABLET, DELAYED RELEASE ORAL at 11:36

## 2023-02-11 RX ADMIN — Medication 975 MILLIGRAM(S): at 05:58

## 2023-02-11 RX ADMIN — Medication 40 MILLIEQUIVALENT(S): at 11:02

## 2023-02-11 RX ADMIN — Medication 400 MILLIGRAM(S): at 09:00

## 2023-02-11 RX ADMIN — Medication 81 MILLIGRAM(S): at 11:04

## 2023-02-11 RX ADMIN — Medication 200 MILLIGRAM(S): at 11:04

## 2023-02-11 RX ADMIN — Medication 975 MILLIGRAM(S): at 11:04

## 2023-02-11 RX ADMIN — Medication 40 MILLIEQUIVALENT(S): at 13:10

## 2023-02-11 RX ADMIN — LOSARTAN POTASSIUM 100 MILLIGRAM(S): 100 TABLET, FILM COATED ORAL at 05:58

## 2023-02-11 RX ADMIN — ANASTROZOLE 1 MILLIGRAM(S): 1 TABLET ORAL at 11:03

## 2023-02-11 RX ADMIN — Medication 400 MILLIGRAM(S): at 08:09

## 2023-02-11 NOTE — PROGRESS NOTE ADULT - ATTENDING COMMENTS
Patient feels well, minimal pain - just some soreness when changing position.  No N/V.  Denies flatus or BM just yet.  On exam abdomen is soft, minimally distended, appropriately tender near incision, Prevena in place.  Lambert out, voiding.  Labs with decreased hgb to 9.5 - suspect this is due to equilibration, no evidence of active bleeding.  Hypomagnesemia noted.    -- Replace magnesium  -- Encourage ambulation  -- Clear liquid diet  -- Pain control - multimodal regimen  -- Continue Prevena for now  -- DVT prophylaxis  -- Monitor I/O  -- Awaiting return of bowel function
Patient seen and examined at bedside. Patient is doing well, tolerating liquids, denies N/V, having bowel function, pain is controlled. Abdomen is soft AT, ND, Provena is in place.    Vital Signs Last 24 Hrs  T(C): 36.9 (10 Feb 2023 08:11), Max: 37.2 (09 Feb 2023 15:29)  T(F): 98.5 (10 Feb 2023 08:11), Max: 99 (09 Feb 2023 15:29)  HR: 83 (10 Feb 2023 08:11) (83 - 98)  BP: 156/71 (10 Feb 2023 08:11) (135/73 - 156/71)  BP(mean): 101 (09 Feb 2023 22:00) (101 - 101)  RR: 18 (10 Feb 2023 08:11) (18 - 18)  SpO2: 96% (10 Feb 2023 08:11) (95% - 97%)    Parameters below as of 10 Feb 2023 08:11  Patient On (Oxygen Delivery Method): room air                          9.2    6.99  )-----------( 135      ( 10 Feb 2023 07:55 )             27.4     A/P  Continue to ensure adequate pain control  Advance to low residue diet  Discontinue IV fluids  OOB with ambulation, DVT ppx  Continue supportive care during recovery, plan for discharge 2/11  Follow up in the office in 2 weeks
S&E   Feels well.  Perla low fiber diet.  Passing gas, no stool.  AFVSS  NAD  soft, NTND  Labs reviewed  A/P -   D/C home.  Pt agreeable.

## 2023-02-11 NOTE — PROGRESS NOTE ADULT - SUBJECTIVE AND OBJECTIVE BOX
Subjective:  The patient reprort     MEDICATIONS  (STANDING):  acetaminophen     Tablet .. 975 milliGRAM(s) Oral every 6 hours  anastrozole 1 milliGRAM(s) Oral daily  aspirin enteric coated 81 milliGRAM(s) Oral daily  atorvastatin 40 milliGRAM(s) Oral at bedtime  enoxaparin Injectable 40 milliGRAM(s) SubCutaneous every 24 hours  hydrochlorothiazide 25 milliGRAM(s) Oral daily  hydroxychloroquine 200 milliGRAM(s) Oral daily  latanoprost 0.005% Ophthalmic Solution 1 Drop(s) Both EYES at bedtime  losartan 100 milliGRAM(s) Oral daily  pantoprazole  Injectable 40 milliGRAM(s) IV Push every 24 hours    MEDICATIONS  (PRN):  ALPRAZolam 0.25 milliGRAM(s) Oral two times a day PRN anxiety  ibuprofen  Tablet. 400 milliGRAM(s) Oral every 6 hours PRN Mild Pain (1 - 3)      Vital Signs Last 24 Hrs  T(C): 36.7 (11 Feb 2023 00:42), Max: 36.9 (10 Feb 2023 08:11)  T(F): 98.1 (11 Feb 2023 00:42), Max: 98.5 (10 Feb 2023 08:11)  HR: 70 (11 Feb 2023 00:42) (70 - 91)  BP: 128/61 (11 Feb 2023 00:42) (128/61 - 164/75)  BP(mean): --  RR: 18 (11 Feb 2023 00:42) (18 - 18)  SpO2: 97% (11 Feb 2023 00:42) (95% - 97%)    Parameters below as of 11 Feb 2023 00:42  Patient On (Oxygen Delivery Method): room air            Physical Exam:    Constitutional:   Respiratory:   Cardiovascular:   Gastrointestinal:   Extremities:   Neurological:         LABS:                        9.2    6.99  )-----------( 135      ( 10 Feb 2023 07:55 )             27.4     02-10    142  |  106  |  9.9  ----------------------------<  87  3.2<L>   |  27.0  |  0.65    Ca    9.4      10 Feb 2023 07:55  Phos  2.6     02-10  Mg     2.1     02-10            Assessment:    Plan:  - Subjective:  The patient is pass flatus. She is tolerating her regular diet.     MEDICATIONS  (STANDING):  acetaminophen     Tablet .. 975 milliGRAM(s) Oral every 6 hours  anastrozole 1 milliGRAM(s) Oral daily  aspirin enteric coated 81 milliGRAM(s) Oral daily  atorvastatin 40 milliGRAM(s) Oral at bedtime  enoxaparin Injectable 40 milliGRAM(s) SubCutaneous every 24 hours  hydrochlorothiazide 25 milliGRAM(s) Oral daily  hydroxychloroquine 200 milliGRAM(s) Oral daily  latanoprost 0.005% Ophthalmic Solution 1 Drop(s) Both EYES at bedtime  losartan 100 milliGRAM(s) Oral daily  pantoprazole  Injectable 40 milliGRAM(s) IV Push every 24 hours    MEDICATIONS  (PRN):  ALPRAZolam 0.25 milliGRAM(s) Oral two times a day PRN anxiety  ibuprofen  Tablet. 400 milliGRAM(s) Oral every 6 hours PRN Mild Pain (1 - 3)      Vital Signs Last 24 Hrs  T(C): 36.7 (11 Feb 2023 00:42), Max: 36.9 (10 Feb 2023 08:11)  T(F): 98.1 (11 Feb 2023 00:42), Max: 98.5 (10 Feb 2023 08:11)  HR: 70 (11 Feb 2023 00:42) (70 - 91)  BP: 128/61 (11 Feb 2023 00:42) (128/61 - 164/75)  BP(mean): --  RR: 18 (11 Feb 2023 00:42) (18 - 18)  SpO2: 97% (11 Feb 2023 00:42) (95% - 97%)    Parameters below as of 11 Feb 2023 00:42  Patient On (Oxygen Delivery Method): room air    Physical Exam:    General: NAD. Lying comfortably in bed.   HEENT: NCAT. Neck supple. EOMI.   Respiratory: Non labored breathing.   Cardio: RRR  Abdomen: Soft, midly-tender throughout, mildly-distended. No guarding or rebound. Incisions c/d/i with minimal kavon-incisional ecchymosis   Extremities: No clubbing or cyanosis.   Neuro: A&O x 3. CNs II-XII grossly intact.         LABS:                        9.2    6.99  )-----------( 135      ( 10 Feb 2023 07:55 )             27.4     02-10    142  |  106  |  9.9  ----------------------------<  87  3.2<L>   |  27.0  |  0.65    Ca    9.4      10 Feb 2023 07:55  Phos  2.6     02-10  Mg     2.1     02-10            Assessment:  Patient is a 76yo F POD2 laparoscopic sigmoidectomy with primary anastomosis, TAP block for perforated sigmoid colon 2/2 colonoscopy injury. Patient recovering appropriately. Lambert out 2/9, passed TOV. Ambulating and pain well controlled.     Plan:   Lov/SCDs   Low residue diet   Zosyn   SAEs   multimodal pain control  OOB with assist  No PT/OT needs  Continue home meds as prescribed   Plan

## 2023-02-11 NOTE — DISCHARGE NOTE NURSING/CASE MANAGEMENT/SOCIAL WORK - PATIENT PORTAL LINK FT
You can access the FollowMyHealth Patient Portal offered by [facility] by registering at the following website: [email or web address]. By joining [employer]’s FollowMyHealth portal, you will also be able to view your health information using other applications (apps) compatible with our system.

## 2023-02-14 LAB — SURGICAL PATHOLOGY STUDY: SIGNIFICANT CHANGE UP

## 2023-02-16 NOTE — H&P PST ADULT - LIVING CHILDREN, OB PROFILE
Pt rounding, introduced self to pt, daughter at bedside. Assisted pt on bedpan. No other identified request noted. Call bell within reach.    2

## 2023-02-20 ENCOUNTER — EMERGENCY (EMERGENCY)
Facility: HOSPITAL | Age: 78
LOS: 1 days | Discharge: HOME | End: 2023-02-20
Attending: EMERGENCY MEDICINE
Payer: MEDICARE

## 2023-02-20 VITALS
WEIGHT: 160.06 LBS | SYSTOLIC BLOOD PRESSURE: 177 MMHG | TEMPERATURE: 98 F | HEIGHT: 64 IN | RESPIRATION RATE: 20 BRPM | HEART RATE: 117 BPM | DIASTOLIC BLOOD PRESSURE: 77 MMHG | OXYGEN SATURATION: 98 %

## 2023-02-20 VITALS — HEART RATE: 82 BPM

## 2023-02-20 DIAGNOSIS — Z90.2 ACQUIRED ABSENCE OF LUNG [PART OF]: Chronic | ICD-10-CM

## 2023-02-20 DIAGNOSIS — Z98.890 OTHER SPECIFIED POSTPROCEDURAL STATES: Chronic | ICD-10-CM

## 2023-02-20 LAB
ALBUMIN SERPL ELPH-MCNC: 3.7 G/DL — SIGNIFICANT CHANGE UP (ref 3.3–5.2)
ALP SERPL-CCNC: 131 U/L — HIGH (ref 40–120)
ALT FLD-CCNC: 26 U/L — SIGNIFICANT CHANGE UP
ANION GAP SERPL CALC-SCNC: 16 MMOL/L — SIGNIFICANT CHANGE UP (ref 5–17)
AST SERPL-CCNC: 22 U/L — SIGNIFICANT CHANGE UP
BASOPHILS # BLD AUTO: 0.06 K/UL — SIGNIFICANT CHANGE UP (ref 0–0.2)
BASOPHILS NFR BLD AUTO: 0.5 % — SIGNIFICANT CHANGE UP (ref 0–2)
BILIRUB SERPL-MCNC: 0.3 MG/DL — LOW (ref 0.4–2)
BUN SERPL-MCNC: 23.4 MG/DL — HIGH (ref 8–20)
CALCIUM SERPL-MCNC: 11 MG/DL — HIGH (ref 8.4–10.5)
CHLORIDE SERPL-SCNC: 95 MMOL/L — LOW (ref 96–108)
CO2 SERPL-SCNC: 27 MMOL/L — SIGNIFICANT CHANGE UP (ref 22–29)
CREAT SERPL-MCNC: 0.68 MG/DL — SIGNIFICANT CHANGE UP (ref 0.5–1.3)
EGFR: 91 ML/MIN/1.73M2 — SIGNIFICANT CHANGE UP
EOSINOPHIL # BLD AUTO: 0.06 K/UL — SIGNIFICANT CHANGE UP (ref 0–0.5)
EOSINOPHIL NFR BLD AUTO: 0.5 % — SIGNIFICANT CHANGE UP (ref 0–6)
GLUCOSE SERPL-MCNC: 109 MG/DL — HIGH (ref 70–99)
HCT VFR BLD CALC: 32.7 % — LOW (ref 34.5–45)
HGB BLD-MCNC: 10.9 G/DL — LOW (ref 11.5–15.5)
IMM GRANULOCYTES NFR BLD AUTO: 1 % — HIGH (ref 0–0.9)
LACTATE BLDV-MCNC: 0.9 MMOL/L — SIGNIFICANT CHANGE UP (ref 0.5–2)
LIDOCAIN IGE QN: 19 U/L — LOW (ref 22–51)
LYMPHOCYTES # BLD AUTO: 1.13 K/UL — SIGNIFICANT CHANGE UP (ref 1–3.3)
LYMPHOCYTES # BLD AUTO: 9.9 % — LOW (ref 13–44)
MCHC RBC-ENTMCNC: 31 PG — SIGNIFICANT CHANGE UP (ref 27–34)
MCHC RBC-ENTMCNC: 33.3 GM/DL — SIGNIFICANT CHANGE UP (ref 32–36)
MCV RBC AUTO: 92.9 FL — SIGNIFICANT CHANGE UP (ref 80–100)
MONOCYTES # BLD AUTO: 0.81 K/UL — SIGNIFICANT CHANGE UP (ref 0–0.9)
MONOCYTES NFR BLD AUTO: 7.1 % — SIGNIFICANT CHANGE UP (ref 2–14)
NEUTROPHILS # BLD AUTO: 9.29 K/UL — HIGH (ref 1.8–7.4)
NEUTROPHILS NFR BLD AUTO: 81 % — HIGH (ref 43–77)
PLATELET # BLD AUTO: 386 K/UL — SIGNIFICANT CHANGE UP (ref 150–400)
POTASSIUM SERPL-MCNC: 3.2 MMOL/L — LOW (ref 3.5–5.3)
POTASSIUM SERPL-SCNC: 3.2 MMOL/L — LOW (ref 3.5–5.3)
PROT SERPL-MCNC: 7.6 G/DL — SIGNIFICANT CHANGE UP (ref 6.6–8.7)
RBC # BLD: 3.52 M/UL — LOW (ref 3.8–5.2)
RBC # FLD: 12.7 % — SIGNIFICANT CHANGE UP (ref 10.3–14.5)
SODIUM SERPL-SCNC: 138 MMOL/L — SIGNIFICANT CHANGE UP (ref 135–145)
WBC # BLD: 11.46 K/UL — HIGH (ref 3.8–10.5)
WBC # FLD AUTO: 11.46 K/UL — HIGH (ref 3.8–10.5)

## 2023-02-20 PROCEDURE — 96375 TX/PRO/DX INJ NEW DRUG ADDON: CPT

## 2023-02-20 PROCEDURE — 80053 COMPREHEN METABOLIC PANEL: CPT

## 2023-02-20 PROCEDURE — 36415 COLL VENOUS BLD VENIPUNCTURE: CPT

## 2023-02-20 PROCEDURE — 93010 ELECTROCARDIOGRAM REPORT: CPT

## 2023-02-20 PROCEDURE — 83690 ASSAY OF LIPASE: CPT

## 2023-02-20 PROCEDURE — 96361 HYDRATE IV INFUSION ADD-ON: CPT

## 2023-02-20 PROCEDURE — 85025 COMPLETE CBC W/AUTO DIFF WBC: CPT

## 2023-02-20 PROCEDURE — 99284 EMERGENCY DEPT VISIT MOD MDM: CPT | Mod: 25

## 2023-02-20 PROCEDURE — 87040 BLOOD CULTURE FOR BACTERIA: CPT

## 2023-02-20 PROCEDURE — 83605 ASSAY OF LACTIC ACID: CPT

## 2023-02-20 PROCEDURE — 93005 ELECTROCARDIOGRAM TRACING: CPT

## 2023-02-20 PROCEDURE — 96374 THER/PROPH/DIAG INJ IV PUSH: CPT

## 2023-02-20 PROCEDURE — 99285 EMERGENCY DEPT VISIT HI MDM: CPT | Mod: GC

## 2023-02-20 RX ORDER — PIPERACILLIN AND TAZOBACTAM 4; .5 G/20ML; G/20ML
3.38 INJECTION, POWDER, LYOPHILIZED, FOR SOLUTION INTRAVENOUS ONCE
Refills: 0 | Status: COMPLETED | OUTPATIENT
Start: 2023-02-20 | End: 2023-02-20

## 2023-02-20 RX ORDER — SODIUM CHLORIDE 9 MG/ML
2000 INJECTION INTRAMUSCULAR; INTRAVENOUS; SUBCUTANEOUS ONCE
Refills: 0 | Status: COMPLETED | OUTPATIENT
Start: 2023-02-20 | End: 2023-02-20

## 2023-02-20 RX ORDER — FLUCONAZOLE 150 MG/1
2 TABLET ORAL
Qty: 10 | Refills: 0
Start: 2023-02-20 | End: 2023-02-24

## 2023-02-20 RX ORDER — VANCOMYCIN HCL 1 G
1000 VIAL (EA) INTRAVENOUS ONCE
Refills: 0 | Status: COMPLETED | OUTPATIENT
Start: 2023-02-20 | End: 2023-02-20

## 2023-02-20 RX ORDER — FLUCONAZOLE 150 MG/1
400 TABLET ORAL ONCE
Refills: 0 | Status: COMPLETED | OUTPATIENT
Start: 2023-02-20 | End: 2023-02-20

## 2023-02-20 RX ADMIN — Medication 1 TABLET(S): at 21:38

## 2023-02-20 RX ADMIN — PIPERACILLIN AND TAZOBACTAM 200 GRAM(S): 4; .5 INJECTION, POWDER, LYOPHILIZED, FOR SOLUTION INTRAVENOUS at 17:52

## 2023-02-20 RX ADMIN — Medication 250 MILLIGRAM(S): at 19:09

## 2023-02-20 RX ADMIN — SODIUM CHLORIDE 2000 MILLILITER(S): 9 INJECTION INTRAMUSCULAR; INTRAVENOUS; SUBCUTANEOUS at 17:52

## 2023-02-20 RX ADMIN — FLUCONAZOLE 400 MILLIGRAM(S): 150 TABLET ORAL at 21:38

## 2023-02-20 NOTE — ED PROVIDER NOTE - NSFOLLOWUPINSTRUCTIONS_ED_ALL_ED_FT
Spoke with Dr Quezada - will addend note to add COPD.    I attempted to call the patient to let her know. VM left for her to call the office back to discuss. Will follow up.    1. Take your antibiotic and your antifungal as prescribed  - Fluconazole 1 tablet daily for 5 days  - Augmentin twice daily for 10 days  2. Follow up with your surgeon as scheduled on Friday.  3. Return to the ED for any new or worsening symptoms.     Skin Abscess  A skin abscess is an infected area on or under your skin that contains pus and other material. An abscess can happen almost anywhere on your body. Some abscesses break open (rupture) on their own. Most continue to get worse unless they are treated. The infection can spread deeper into the body and into your blood, which can make you feel sick. Treatment usually involves draining the abscess.    Follow these instructions at home:  Abscess Care     If you have an abscess that has not drained, place a warm, clean, wet washcloth over the abscess several times a day. Do this as told by your doctor.  Follow instructions from your doctor about how to take care of your abscess. Make sure you:    Cover the abscess with a bandage (dressing).  Change your bandage or gauze as told by your doctor.  Wash your hands with soap and water before you change the bandage or gauze. If you cannot use soap and water, use hand .    Check your abscess every day for signs that the infection is getting worse. Check for:    More redness, swelling, or pain.  More fluid or blood.  Warmth.  More pus or a bad smell.    Medicines     Image   Take over-the-counter and prescription medicines only as told by your doctor.  If you were prescribed an antibiotic medicine, take it as told by your doctor. Do not stop taking the antibiotic even if you start to feel better.  General instructions     To avoid spreading the infection:    Do not share personal care items, towels, or hot tubs with others.  Avoid making skin-to-skin contact with other people.    Keep all follow-up visits as told by your doctor. This is important.  Contact a doctor if:  You have more redness, swelling, or pain around your abscess.  You have more fluid or blood coming from your abscess.  Your abscess feels warm when you touch it.  You have more pus or a bad smell coming from your abscess.  You have a fever.  Your muscles ache.  You have chills.  You feel sick.  Get help right away if:  You have very bad (severe) pain.  You see red streaks on your skin spreading away from the abscess.

## 2023-02-20 NOTE — ED ADULT NURSE NOTE - CHIEF COMPLAINT QUOTE
s/p bowel resection after perforation during colonoscopy here for an assessment of wounds. skin is irritated and red extending to back No

## 2023-02-20 NOTE — CONSULT NOTE ADULT - ASSESSMENT
77 year old female with PSH of laparoscopic sigmoidectomy for perforated diverticulitis on 2/8/2023, with midline wound infection, most likely infected seroma vs fat necrosis.  3 inferior staples were removed, the wound was propped, and serous murky fluid was expressed, the fascia is intact.  The fluid was drained, and the wound was packed with gauze.    Patient was instructed on wound care daily with dry gauze  She will call Dr Rubio office to see him on Friaday, she was also given instructions if she develops pain and fever to come to the ED  Patient is being discharged with Augmentin for 10 days and antifungal for 5 days

## 2023-02-20 NOTE — ED ADULT TRIAGE NOTE - CHIEF COMPLAINT QUOTE
s/p bowel resection after perforation during colonoscopy here for an assessment of wounds. skin is irritated and red extending to back

## 2023-02-20 NOTE — ED PROVIDER NOTE - OBJECTIVE STATEMENT
76yo F w/pmh recent surgery 2/8/23 for bowel perforation 2/2 endoscopy presents for pain, erythema from the wound x4d, purulent discharge from it yesterday. Denies f/ch, n/v. Reports she went to her PCP today who gave her cipro and flagyl but hasn't started them, 78yo F w/pmh recent surgery 2/8/23 by Dr. Rubio for bowel perforation 2/2 endoscopy presents for pain, erythema from the wound x4d, purulent discharge from it yesterday. Denies f/ch, n/v. Reports she went to her PCP today who gave her cipro and flagyl but hasn't started them.

## 2023-02-20 NOTE — ED ADULT NURSE NOTE - OBJECTIVE STATEMENT
pt a+ox3, s/p bowel resection, presents to ED c/o tenderness and redness to surgical site. lower abd red, hot to touch and tender. pt reports feeling a "pop and rush of fluid yesterday." pt to be moved to main ED for further evaluation and work up.

## 2023-02-20 NOTE — ED PROVIDER NOTE - CLINICAL SUMMARY MEDICAL DECISION MAKING FREE TEXT BOX
76yo F w/pmh recent surgery 2/8/23 by Dr. Rubio for bowel perforation 2/2 endoscopy presents for pain, erythema from the wound x4d, purulent discharge x1d. Erythema, induration, tenderness on exam. Consulted surgery, who performed bedside I&D. Okay for d/c with antibiotics, antifungal, outpatient f/u, and return precautions.

## 2023-02-20 NOTE — ED PROVIDER NOTE - PATIENT PORTAL LINK FT
You can access the FollowMyHealth Patient Portal offered by [facility] by registering at the following website: [email or web address]. By joining [facility]’s FollowMyHealth portal, you will also be able to view your health information using other applications (apps) compatible with our system.

## 2023-02-20 NOTE — CONSULT NOTE ADULT - SUBJECTIVE AND OBJECTIVE BOX
COLORECTAL SURGERY CONSULT    HPI:  77 year old female with PSH of laparoscopic sigmoidectomy for perforated diverticulitis on 2/8/2023, presented to the ED with redness and fluid drainage from the midline incision.  Patient started to notice the redness 5 dyas ago, this morning she saw her PCP who prescribed cipro/flagyl.  The patient denies any fever, chills, abdominal pain, nausea, vomiting, change in bowel function and dysuria      PAST MEDICAL HISTORY:  Solitary pulmonary nodule    Hypertension    Hyperlipidemia    OA (osteoarthritis)    Lung cancer    GERD (gastroesophageal reflux disease)    Obstructive sleep apnea on CPAP        PAST SURGICAL HISTORY:  History of colonoscopy    S/P lobectomy of lung        ALLERGIES:  No Known Allergies      FAMILY HISTORY: Noncontributory    SOCIAL HISTORY: Denies tobacco, EtOH, illicit substance use.     HOME MEDICATIONS:    MEDICATIONS  (STANDING):    MEDICATIONS  (PRN):      VITALS & I/Os:  Vital Signs Last 24 Hrs  T(C): 36.6 (20 Feb 2023 15:53), Max: 36.6 (20 Feb 2023 15:53)  T(F): 97.8 (20 Feb 2023 15:53), Max: 97.8 (20 Feb 2023 15:53)  HR: 82 (20 Feb 2023 19:13) (82 - 117)  BP: 177/77 (20 Feb 2023 15:53) (177/77 - 177/77)  BP(mean): --  RR: 20 (20 Feb 2023 15:53) (20 - 20)  SpO2: 98% (20 Feb 2023 15:53) (98% - 98%)    Parameters below as of 20 Feb 2023 15:53  Patient On (Oxygen Delivery Method): room air      CAPILLARY BLOOD GLUCOSE          I&O's Summary      GENERAL: Alert, well developed, in no acute distress.  MENTAL STATUS: AAOx3. Appropriate affect.  HEENT: PERRLA. EOMI. MMM.  Trachea midline. No lymph node swelling or tenderness.  RESPIRATORY: CTAB. No wheezing, rales or rhonchi.  CARDIOVASCULAR: RRR. No audible murmurs, rubs or gallops.   GASTROINTESTINAL: Abdomen soft, NT, ND, midline incision with staples in place, skin intact, there is redness and skin induration around the midline incision.    NEUROLOGIC: Cranial nerves II-XII grossly intact. No focal neurological deficits. Moves all extremities spontaneously. Sensation intact bilaterally.  INTEGUMENTARY: No overt rashes or lesions, petechia or purpura. Good turgor. No edema.  MUSCULOSKELETAL: No cyanosis or clubbing. No gross deformities.   LYMPHATIC: Palpation of neck reveals no swelling or tenderness of neck nodes. Palpation of groin reveals no swelling or tenderness of groin nodes.    LABS:                        10.9   11.46 )-----------( 386      ( 20 Feb 2023 17:44 )             32.7     02-20    138  |  95<L>  |  23.4<H>  ----------------------------<  109<H>  3.2<L>   |  27.0  |  0.68    Ca    11.0<H>      20 Feb 2023 17:44    TPro  7.6  /  Alb  3.7  /  TBili  0.3<L>  /  DBili  x   /  AST  22  /  ALT  26  /  AlkPhos  131<H>  02-20    Lactate:            02-20 @ 17:44  0.90        IMAGING:

## 2023-02-20 NOTE — ED ADULT NURSE REASSESSMENT NOTE - NS ED NURSE REASSESS COMMENT FT1
pt care assumed at 1700, no apparent distress noted at this time, charting as noted. Pt received A&Ox4 resting in bed comfortably at this time. pt abdominal has staples in place and is red, warm and tender to the touch. pt awaiting ct at this time.

## 2023-02-20 NOTE — ED ADULT NURSE REASSESSMENT NOTE - NS ED NURSE REASSESS COMMENT FT1
Assumed care of patient at 1930 from RONNI Franco. Charting as noted. Pt A&Ox4, resp even/unlabored, continuos cardiac monitoring and  in place, NSR on monitor, rate 87, SpO2 98% on room air, Dr. Jim Baer at bed side. As per Dr. Jim Baer, CT is no longer indicated for pt and will be canceled. Pt denies any acute pain/discomfort at this current time. No acute distress noted, all safety measures maintained

## 2023-02-20 NOTE — ED PROVIDER NOTE - ATTENDING CONTRIBUTION TO CARE
I, Genesis Wyman, have personally seen and examined this patient. I have fully participated in the care of this patient. I have reviewed all pertinent clinical information, including history, physical exam, plan and the Resident's note and agree except as noted below.    78yo F with HTN, HLD, OA, ~2 weeks post op ex lap for SBO, last 5 days increasing pain/redness to surgical site. today with drainage. no fever. +chills   PE- prominent erythema from lower surgical site across lower abd and into vulva area with induration and warmth no draiange.     labs pending, ct ordered, Sx consulted Patient signed out to Dr. Dowling pending results of testing and reassesment.  All decisions regarding the progression of care will be made at their discretion.

## 2023-02-20 NOTE — ED PROVIDER NOTE - PROGRESS NOTE DETAILS
Resident Renea Rodrigues: Consulted surgery, who performed a bedside I&D of the incision, purulent drainage expressed, packing placed. Recommend antibiotics for 10d, antifungal for 5d, f/u with Dr. Rubio on Friday.

## 2023-02-20 NOTE — ED PROVIDER NOTE - NS ED ROS FT
Constitutional: no fever, no sweats, and no chills.  CV: no chest pain, no edema.  Resp: no cough, no dyspnea  GI: +abdominal pain, no nausea and no vomiting.  MSK: no msk pain, no weakness  Skin: +lesions, and +rashes.  Neuro: no headache, no dizziness  ROS otherwise negative except as noted in HPI.

## 2023-02-20 NOTE — ED PROVIDER NOTE - PHYSICAL EXAMINATION
General: well appearing, NAD  Head: NC/AT  Cardiac: RRR, no m/r/g  Respiratory: CTABL, equal chest wall expansions, no conversational dyspnea  Abdomen: soft, ND, midline incision s/p staple repair with large area of surrounding erythema (20cm x 20cm) with smaller area of induration (2cm on either side of the incision), tender to palpation  Neuro: AAOx3, coordinated movement of all 4 extremities  Psych: calm, cooperative, normal affect  Skin: abdomen as noted above, otherwise warm and dry

## 2023-02-20 NOTE — ED ADULT NURSE NOTE - CAS ELECT INFOMATION PROVIDED
Pt d/c'd by Dr. Renea LOPEZ All d/c instructions, education, and signatures obtained and provided by MD/HÉCTOR instructions Pt d/c'd by Dr. Renea Rodrigues. All d/c instructions, education, and signatures obtained and provided by MD/HÉCTOR instructions

## 2023-02-23 ENCOUNTER — NON-APPOINTMENT (OUTPATIENT)
Age: 78
End: 2023-02-23

## 2023-02-23 ENCOUNTER — APPOINTMENT (OUTPATIENT)
Dept: COLORECTAL SURGERY | Facility: CLINIC | Age: 78
End: 2023-02-23
Payer: MEDICARE

## 2023-02-23 VITALS
SYSTOLIC BLOOD PRESSURE: 151 MMHG | BODY MASS INDEX: 29.44 KG/M2 | DIASTOLIC BLOOD PRESSURE: 53 MMHG | HEIGHT: 62 IN | TEMPERATURE: 97 F | HEART RATE: 97 BPM | RESPIRATION RATE: 15 BRPM | WEIGHT: 160 LBS

## 2023-02-23 PROCEDURE — 99024 POSTOP FOLLOW-UP VISIT: CPT

## 2023-02-23 NOTE — HISTORY OF PRESENT ILLNESS
[FreeTextEntry1] : 77 year old female presenting for post-operative visit after s/p Laparoscopic sigmoidectomy for management of perforation after colonoscopy. Patient's course was complicated by a wound seroma that was drained by opening 2 cm of the midline incision at the inferior most aspect. Patient has been doing well otherwise. Patient is tolerating a diet, having bowel function, and pain is controlled. NO other complaints.

## 2023-02-23 NOTE — PHYSICAL EXAM
Pt here at the office and picked up completed form    [Alert] : alert [Oriented to Person] : oriented to person [Oriented to Place] : oriented to place [Oriented to Time] : oriented to time [Calm] : calm [de-identified] : Abdomen soft, nontender, nondistended. Wound minimal surrounding erythema, small 2cm opening at the inferior aspect with serous drainage. Wound with healthy tissue, fascia intact. wound repacked with iodoform. Most staples removed, the remainder will be removed on subsequent office visit [de-identified] : NAD [de-identified] : Nonlabored [de-identified] : Normal rate

## 2023-02-23 NOTE — ASSESSMENT
[FreeTextEntry1] : 77 year old female s/p Laparoscopic sigmoidectomy. Patient is doing well, will continue with daily wound care. Patient will follow up with me within 1 week. No dietary restrictions at this point. No heavy lifting for 6 more weeks.

## 2023-02-25 LAB
CULTURE RESULTS: SIGNIFICANT CHANGE UP
CULTURE RESULTS: SIGNIFICANT CHANGE UP
SPECIMEN SOURCE: SIGNIFICANT CHANGE UP
SPECIMEN SOURCE: SIGNIFICANT CHANGE UP

## 2023-02-27 ENCOUNTER — APPOINTMENT (OUTPATIENT)
Dept: COLORECTAL SURGERY | Facility: CLINIC | Age: 78
End: 2023-02-27
Payer: MEDICARE

## 2023-02-27 VITALS
SYSTOLIC BLOOD PRESSURE: 143 MMHG | OXYGEN SATURATION: 96 % | HEIGHT: 63 IN | TEMPERATURE: 98.6 F | BODY MASS INDEX: 28.35 KG/M2 | HEART RATE: 96 BPM | RESPIRATION RATE: 14 BRPM | WEIGHT: 160 LBS | DIASTOLIC BLOOD PRESSURE: 81 MMHG

## 2023-02-27 VITALS
RESPIRATION RATE: 14 BRPM | DIASTOLIC BLOOD PRESSURE: 81 MMHG | TEMPERATURE: 97.6 F | WEIGHT: 160 LBS | SYSTOLIC BLOOD PRESSURE: 143 MMHG | OXYGEN SATURATION: 96 % | BODY MASS INDEX: 28.35 KG/M2 | HEART RATE: 96 BPM | HEIGHT: 63 IN

## 2023-02-27 PROCEDURE — 99024 POSTOP FOLLOW-UP VISIT: CPT

## 2023-02-27 NOTE — HISTORY OF PRESENT ILLNESS
[FreeTextEntry1] : 77 year old female presenting for post-operative visit after s/p Laparoscopic sigmoidectomy for management of perforation after colonoscopy. Patient's course was complicated by a wound seroma that was drained by opening 2 cm of the midline incision at the inferior most aspect. Patient has been doing well otherwise. On last visit wound was evaluated and noted to be healthy, packing was removed and external dressing replaced. Patient is tolerating a diet, having bowel function, and pain is controlled. NO other complaints.

## 2023-02-27 NOTE — PHYSICAL EXAM
[Alert] : alert [Oriented to Person] : oriented to person [Oriented to Place] : oriented to place [Oriented to Time] : oriented to time [Calm] : calm [de-identified] : Abdomen soft, nontender, nondistended. Wound with resolved erythema, small 2cm opening at the inferior aspect with scant serous drainage. Wound with healthy tissue, fascia intact. Remaining staples removed. [de-identified] : NAD [de-identified] : Nonlabored [de-identified] : Normal rate

## 2023-02-27 NOTE — ASSESSMENT
[FreeTextEntry1] : 77 year old female s/p Laparoscopic sigmoidectomy. Patient is doing well, will continue with daily wound care. Patient will follow up with me in 4 weeks. No dietary restrictions at this point. No heavy lifting for 5 more weeks.

## 2023-03-09 NOTE — H&P PST ADULT - ALCOHOL USE HISTORY SINGLE SELECT
yes... Cyclosporine Pregnancy And Lactation Text: This medication is Pregnancy Category C and it isn't know if it is safe during pregnancy. This medication is excreted in breast milk.

## 2023-03-13 ENCOUNTER — APPOINTMENT (OUTPATIENT)
Dept: ORTHOPEDIC SURGERY | Facility: CLINIC | Age: 78
End: 2023-03-13
Payer: MEDICARE

## 2023-03-13 VITALS
HEART RATE: 77 BPM | HEIGHT: 63 IN | DIASTOLIC BLOOD PRESSURE: 77 MMHG | WEIGHT: 160 LBS | SYSTOLIC BLOOD PRESSURE: 126 MMHG | BODY MASS INDEX: 28.35 KG/M2

## 2023-03-13 PROCEDURE — 20610 DRAIN/INJ JOINT/BURSA W/O US: CPT | Mod: 50

## 2023-03-13 PROCEDURE — 99214 OFFICE O/P EST MOD 30 MIN: CPT | Mod: 25

## 2023-03-13 NOTE — HISTORY OF PRESENT ILLNESS
[Pain Location] : pain [Worsening] : worsening [5] : a current pain level of 5/10 [de-identified] : 76 y/o F presents with b/l knee pain. The pain on her knees switches.She has a known hx of bone on bone medial compartmental osteoarthritis of the right knee and near bone on bone medial compartmental osteoarthritis of the left knee. She notes medial side pain. She states her pain is worse with walking, bending, and standing for a long time. She gets relief from intermittent cortisone injections. Her last cortisone injection was in Dec, 2022. She received cortisone injection at her last appointment which helped for about 5 weeks. She has received PRP injections in the past from Dr. Stephan Chapa. \par \par \par STEPHEN MACK presents with pain. She states the symptoms are worsening. Pain levels include a current pain level of 7/10, a minimum pain level of 2/10 and a maximum pain level of 8/10. \par \par \par Modifying factors - worsened by bending, worsened by walking and worsened with knee flexion . stairs. Relieving factors include relieved by rest. \par \par

## 2023-03-13 NOTE — PHYSICAL EXAM
[de-identified] : GENERAL APPEARANCE: Well nourished and hydrated, pleasant, alert, and oriented x 3. Appears their stated age. \par HEENT: Normocephalic, extraocular eye motion intact. Nasal septum midline. Oral cavity clear. External auditory canal clear. \par RESPIRATORY: Breath sounds clear and audible in all lobes. No wheezing, No accessory muscle use.\par CARDIOVASCULAR: No apparent abnormalities. No lower leg edema. No varicosities. Pedal pulses are palpable.\par NEUROLOGIC: Sensation is normal, no muscle weakness in the upper or lower extremities.\par DERMATOLOGIC: No apparent skin lesions, moist, warm, no rash.\par SPINE: Cervical spine appears normal and moves freely; thoracic spine appears normal and moves freely; lumbosacral spine appears normal and moves freely, normal, nontender.\par MUSCULOSKELETAL: Hands, wrists, and elbows are normal and move freely, shoulders are normal and move freely. \par Musculoskeletal:. Right knee exam shows medial joint line tenderness, no effusion, ROM 10-90 degrees. \par Left knee exam shows medial joint line tenderness, no effusion. \par 5/5 motor strength in bilateral lower extremities. Sensory: Intact in bilateral lower extremities. DTRs: Biceps, brachioradialis, triceps, patellar, ankle and plantar 2+ and symmetric bilaterally. Pulses: dorsalis pedis, posterior tibial, femoral, popliteal, and radial 2+ and symmetric bilaterally. \par Constitutional: Alert and in no acute distress, but well-appearing. \par   [de-identified] : Bilateral knee 5 views showed medial compartment bone-on-bone osteoarthritis right greater than left with varus alignment there is significant osteophytes.  No pathologic lesions were identified.

## 2023-03-13 NOTE — DISCUSSION/SUMMARY
[Surgical risks reviewed] : Surgical risks reviewed [Medication Risks Reviewed] : Medication risks reviewed [de-identified] : 78 y/o F presents with bone on bone medial compartmental osteoarthritis of the right knee and near bone on bone medial compartmental osteoarthritis of the left knee. The nature of her condition and treatment options were discussed in detail. The pt is a candidate for a staged bilateral TKA. We discussed that she is not interested in surgery given her recent diagnosis of breast cancer. She is currently undergoing active treatment. For now she opted for bilateral knee cortisone injections which she tolerated well. She would like to exhaust conservative treatment. She will f/u in 3 months for a repeat injection if needed. All questions were answered. \par \par The patient is a 77 year individual with end stage arthritis of their b/l knee joint. Based upon the patient's continued symptoms and failure to respond to conservative treatment (including HA injections, cortisone injections, over the counter medications, and PT) I have recommended a b/l total knee arthroplasty for this patient. A long discussion took place with the patient describing what a total joint replacement is and what the procedure would entail. A total knee arthroplasty model, similar to the implant that was used during the operation, was utilized to demonstrate and to discuss the various bearing surfaces of the implants. The hospitalization and post-operative care and rehabilitation were also discussed. The use of perioperative antibiotics and DVT prophylaxis were discussed. The risk, benefits and alternatives to a surgical intervention were discussed at length with the patient. The patient was also advised of risks related to the medical comorbidities, elevated body mass index (BMI), and smoking where applicable. We discussed how to reduce modifiable risk factors and encouraged smoking cessation were applicable.. A lengthy discussion took place to review the most common complications including but not limited to: deep vein thrombosis, pulmonary embolus, heart attack, stroke, infection, wound breakdown, numbness, damage to nerves, tendon, muscles, arteries or other blood vessels, death and other possible complications from anesthesia. The patient was told that we will take steps to minimize these risks by using sterile technique, antibiotics and DVT prophylaxis when appropriate and follow the patient postoperatively in the office setting to monitor progress. The possibility of recurrent pain, no improvement in pain and actual worsening of pain were also discussed with the patient.\par The discharge plan of care focused on the patient going home following surgery. The patient was encouraged to make the necessary arrangements to have someone stay with them when they are discharged home. Following discharge, a home care nurse was to the patient. The home care nurse would open the patient’s home care case and request home physical therapy services. Home physical therapy was to commence following discharge provided it was appropriate and covered by the health insurance benefit plan. \par The benefits of surgery were discussed with the patient including the potential for improving her current clinical condition through operative intervention. Alternatives to surgical intervention including continued conservative management were also discussed in detail. All questions were answered to the satisfaction of the patient. The treatment plan of care, as well as a model of a total knee arthroplasty equivalent to the one that will be used for their total joint replacement, was shared with the patient. The patient agreed to the plan of care as well as the use of implants in their total joint replacement.

## 2023-03-13 NOTE — PROCEDURE
[de-identified] : I injected the patient's right knee today with cortisone for primary osteoarthritis.\par \par I discussed at length with the patient the planned steroid and lidocaine injection. The risks, benefits, convalescence and alternatives were reviewed. The possible side effects discussed included but were not limited to: pain, swelling, heat, bleeding, and redness. Symptoms are generally mild but if they are extensive then contact the office. Giving pain relievers by mouth such as NSAIDs or Tylenol can generally treat the reactions to steroid and lidocaine. Rare cases of infection have been noted. Rash, hives and itching may occur post injection. If you have muscle pain or cramps, flushing and or swelling of the face, rapid heart beat, nausea, dizziness, fever, chills, headache, difficulty breathing, swelling in the arms or legs, or have a prickly feeling of your skin, contact a health care provider immediately. Following this discussion, the knee was prepped with Alcohol and under sterile condition the 80 mg Depo-Medrol and 6 cc Lidocaine injection was performed with a 20 gauge needle through a superolateral injection site. The needle was introduced into the joint, aspiration was performed to ensure intra-articular placement and the medication was injected. Upon withdrawal of the needle the site was cleaned with alcohol and a band aid applied. The patient tolerated the injection well and there were no adverse effects. Post injection instructions included no strenuous activity for 24 hours, cryotherapy and if there are any adverse effects to contact the office.	 \par \par I injected the patient's left knee today with cortisone for primary osteoarthritis.\par \par I discussed at length with the patient the planned steroid and lidocaine injection. The risks, benefits, convalescence and alternatives were reviewed. The possible side effects discussed included but were not limited to: pain, swelling, heat, bleeding, and redness. Symptoms are generally mild but if they are extensive then contact the office. Giving pain relievers by mouth such as NSAIDs or Tylenol can generally treat the reactions to steroid and lidocaine. Rare cases of infection have been noted. Rash, hives and itching may occur post injection. If you have muscle pain or cramps, flushing and or swelling of the face, rapid heart beat, nausea, dizziness, fever, chills, headache, difficulty breathing, swelling in the arms or legs, or have a prickly feeling of your skin, contact a health care provider immediately. Following this discussion, the knee was prepped with Alcohol and under sterile condition the 80 mg Depo-Medrol and 6 cc Lidocaine injection was performed with a 20 gauge needle through a superolateral injection site. The needle was introduced into the joint, aspiration was performed to ensure intra-articular placement and the medication was injected. Upon withdrawal of the needle the site was cleaned with alcohol and a band aid applied. The patient tolerated the injection well and there were no adverse effects. Post injection instructions included no strenuous activity for 24 hours, cryotherapy and if there are any adverse effects to contact the office. \par

## 2023-03-27 ENCOUNTER — APPOINTMENT (OUTPATIENT)
Dept: COLORECTAL SURGERY | Facility: CLINIC | Age: 78
End: 2023-03-27
Payer: MEDICARE

## 2023-03-27 VITALS
WEIGHT: 158 LBS | DIASTOLIC BLOOD PRESSURE: 67 MMHG | BODY MASS INDEX: 28 KG/M2 | OXYGEN SATURATION: 97 % | SYSTOLIC BLOOD PRESSURE: 131 MMHG | HEIGHT: 63 IN | HEART RATE: 79 BPM | RESPIRATION RATE: 14 BRPM

## 2023-03-27 DIAGNOSIS — Z09 ENCOUNTER FOR FOLLOW-UP EXAMINATION AFTER COMPLETED TREATMENT FOR CONDITIONS OTHER THAN MALIGNANT NEOPLASM: ICD-10-CM

## 2023-03-27 PROCEDURE — 99024 POSTOP FOLLOW-UP VISIT: CPT

## 2023-03-27 NOTE — ASSESSMENT
[FreeTextEntry1] : 77 year old female s/p Laparoscopic sigmoidectomy. Patient is doing well, having normal bowel function, wound healed well. No dietary restrictions. Patient can follow up as needed.

## 2023-03-27 NOTE — HISTORY OF PRESENT ILLNESS
[FreeTextEntry1] : 77 year old female presenting for follow up post-operative visit after s/p Laparoscopic sigmoidectomy for management of perforation after colonoscopy. Patient's course was complicated by a wound seroma that was drained by opening 2 cm of the midline incision at the inferior most aspect. Patient has been doing well otherwise. On last visit wound was evaluated and noted to be healthy and healing well. Patient is tolerating a diet, having bowel function, and pain is controlled. NO other complaints.

## 2023-03-27 NOTE — PHYSICAL EXAM
[Alert] : alert [Oriented to Person] : oriented to person [Oriented to Place] : oriented to place [Oriented to Time] : oriented to time [Calm] : calm [de-identified] : Abdomen soft, nontender, nondistended. Wound fully healed [de-identified] : NAD [de-identified] : Nonlabored [de-identified] : Normal rate

## 2023-04-20 ENCOUNTER — OUTPATIENT (OUTPATIENT)
Dept: OUTPATIENT SERVICES | Facility: HOSPITAL | Age: 78
LOS: 1 days | Discharge: HOME | End: 2023-04-20

## 2023-04-20 DIAGNOSIS — Z98.890 OTHER SPECIFIED POSTPROCEDURAL STATES: Chronic | ICD-10-CM

## 2023-04-20 DIAGNOSIS — C34.90 MALIGNANT NEOPLASM OF UNSPECIFIED PART OF UNSPECIFIED BRONCHUS OR LUNG: ICD-10-CM

## 2023-04-20 DIAGNOSIS — Z90.2 ACQUIRED ABSENCE OF LUNG [PART OF]: Chronic | ICD-10-CM

## 2023-04-25 ENCOUNTER — APPOINTMENT (OUTPATIENT)
Dept: HEMATOLOGY ONCOLOGY | Facility: CLINIC | Age: 78
End: 2023-04-25
Payer: MEDICARE

## 2023-04-25 ENCOUNTER — RESULT REVIEW (OUTPATIENT)
Age: 78
End: 2023-04-25

## 2023-04-25 VITALS
TEMPERATURE: 97.1 F | HEART RATE: 70 BPM | OXYGEN SATURATION: 96 % | DIASTOLIC BLOOD PRESSURE: 71 MMHG | BODY MASS INDEX: 29.23 KG/M2 | HEIGHT: 63 IN | SYSTOLIC BLOOD PRESSURE: 180 MMHG | WEIGHT: 165 LBS

## 2023-04-25 LAB
BASOPHILS # BLD AUTO: 0.1 K/UL — SIGNIFICANT CHANGE UP (ref 0–0.2)
BASOPHILS NFR BLD AUTO: 1.4 % — SIGNIFICANT CHANGE UP (ref 0–2)
EOSINOPHIL # BLD AUTO: 0.1 K/UL — SIGNIFICANT CHANGE UP (ref 0–0.5)
EOSINOPHIL NFR BLD AUTO: 2.5 % — SIGNIFICANT CHANGE UP (ref 0–6)
HCT VFR BLD CALC: 33.3 % — LOW (ref 34.5–45)
HGB BLD-MCNC: 11.4 G/DL — LOW (ref 11.5–15.5)
LYMPHOCYTES # BLD AUTO: 0.8 K/UL — LOW (ref 1–3.3)
LYMPHOCYTES # BLD AUTO: 21.4 % — SIGNIFICANT CHANGE UP (ref 13–44)
MCHC RBC-ENTMCNC: 32.5 PG — SIGNIFICANT CHANGE UP (ref 27–34)
MCHC RBC-ENTMCNC: 34.2 G/DL — SIGNIFICANT CHANGE UP (ref 32–36)
MCV RBC AUTO: 95.2 FL — SIGNIFICANT CHANGE UP (ref 80–100)
MONOCYTES # BLD AUTO: 0.4 K/UL — SIGNIFICANT CHANGE UP (ref 0–0.9)
MONOCYTES NFR BLD AUTO: 10 % — SIGNIFICANT CHANGE UP (ref 2–14)
NEUTROPHILS # BLD AUTO: 2.5 K/UL — SIGNIFICANT CHANGE UP (ref 1.8–7.4)
NEUTROPHILS NFR BLD AUTO: 64.7 % — SIGNIFICANT CHANGE UP (ref 43–77)
PLATELET # BLD AUTO: 156 K/UL — SIGNIFICANT CHANGE UP (ref 150–400)
RBC # BLD: 3.5 M/UL — LOW (ref 3.8–5.2)
RBC # FLD: 12.9 % — SIGNIFICANT CHANGE UP (ref 10.3–14.5)
WBC # BLD: 3.8 K/UL — SIGNIFICANT CHANGE UP (ref 3.8–10.5)
WBC # FLD AUTO: 3.8 K/UL — SIGNIFICANT CHANGE UP (ref 3.8–10.5)

## 2023-04-25 PROCEDURE — 99214 OFFICE O/P EST MOD 30 MIN: CPT

## 2023-04-25 NOTE — ADDENDUM
[FreeTextEntry1] : Documented by Tiffanie Rivera acting as scribe for  on 04/25/2023 \par \par All Medical record entries made by the Scribe were at my, 's , direction and personally dictated by me on 04/25/2023 . I have reviewed the chart and agree that the record accurately reflects my personal performance of the history, physical exam, assessment and plan. I have also personally directed, reviewed, and agreed with the discharge instructions.\par

## 2023-04-25 NOTE — PHYSICAL EXAM
[de-identified] : supple [de-identified] : no edema [Normal] : affect appropriate [de-identified] : no palpable breast mass or axillary adenopathy

## 2023-04-25 NOTE — HISTORY OF PRESENT ILLNESS
[de-identified] : Ms. White is a 76 year old female with newly diagnosed right breast IDC grade 1 ER % RI 41-50% Her2 negative at age 76.\par \par Patient underwent screening mammogram on 11/29/21 demonstrating right focal asymmetry with queried architectural distortion is indeterminate and left upper asymmetry is indeterminate. Obtained diagnostic mammogram and sonogram on 12/27/21 demonstrating a 5mm hypoechoic mass with associated mild distortion in the right breast at 11:00, 2cm from the nipple with recommendation for biopsy; additional findings of probably benign asymmetry in the upper left breast, far posteriorly, most likely related to postsurgical changes from prior left lung surgery with recommendation for 6 month follow up diagnostic mammogram \par \par 1/11/2022 Underwent right breast 11:00, core biopsy- IDC, well differentiated, Denis score 5/9 (2+ 2 + 1), measuring at least 3 mm, DCIS with low \par nuclear grade with microcalcification, lymphovascular permeation by tumor not seen. ER: % RI: 41-50% Her-2:  Negative (1+) Ki67 10%\par \par 02/08/2022 [employer] Genetic testing- negative no clinically significant mutation identified. Variant of uncertain significance- BRIP1\par \par 02/09/2022 MR Breast- Biopsy-proven right breast malignancy (7 mm). No evidence of multifocal or multicentric disease.\par \par \par Planning for right breast lumpectomy with Dr. Pickett \par Recalls recent DEXA from 2021 was normal\par Has only received Hydroxychloroquine for rheumatoid arthritis. Has intermittent joint pain/stiffness currently \par Following up with pulmonologist Dr. Comer for imaging related to history of lung adenocarcinoma \par \par PMH: Left lower lobe adenocarcinoma s/p lobectomy on 5/4/21,Hypertension, Hyperlipidemia, Rheumatoid Arthritis\par PSH: Left lobectomy on 5/4/21\par SH: History of smoking, quit 30 years ago \par FH: Denies family history of cancer\par Rheumatologist- Dr. Davis \par Pulmonologist- Dr. Comer [de-identified] : Patient presents for follow-up. Began anastrazole on 7/14/22. \par Notes compliance to Anastrozole daily \par Taking vitamin D3 and K2 OTC daily \par Denies black stool, \par Reports weight loss 2/2 diverticulitis in 2/2023 with f/u colonoscopy which caused colonic perforation. S/p Laparoscopic sigmoidectomy for management of perforation. Patient's course was complicated by a wound seroma that was drained by opening 2 cm of the midline incision at the inferior most aspect. \par \par 3/7/23 Dexa: b/l femur neck osteopenia\par

## 2023-04-25 NOTE — CONSULT LETTER
[Consult Letter:] : I had the pleasure of evaluating your patient, [unfilled]. [Consult Closing:] : Thank you very much for allowing me to participate in the care of this patient.  If you have any questions, please do not hesitate to contact me. [Dear  ___] : Dear  [unfilled], [Courtesy Letter:] : I had the pleasure of seeing your patient, [unfilled], in my office today. [Please see my note below.] : Please see my note below. [Sincerely,] : Sincerely, [FreeTextEntry3] : Clarence Boone MD\par Medical Oncology/Hematology\par [facility] [place]\par \par [facility] at [facility]\par \par

## 2023-04-25 NOTE — ASSESSMENT
[FreeTextEntry1] : 77 year old female with stage IA (pT1bN0) right breast IDC grade 1, 9 mm, ER % ND 41-50% Her2 negative.\par Oncotype Dx RS is 14 with <1% benefit from chemo and risk of distant recurrence with Sinclair or AI is 4%.\par Completed adjuvant breast RT on 6/28/22. \par Began Anastrazole on 7/16/22 after completing RT\par 3/7/23 Dexa: b/l femur neck osteopenia\par \par Tolerating anastrazole well overall. S/p lab sigmoidectomy 2/2 colonic perforation after colonoscopy, h/o diverticulitis. Hgb 11.3 g/dL and improving.\par \par Plan:\par Continue Anastrazole daily, refilled \par Mammo/sono in 01/2023, Next due May 2023\par Continue Vitamin D3 OTC daily. Vitamin D level ordered\par Continue f/u with Dr. Mendez\par Osteopenia  - discussed Prolia, Zometa as AIs will further decrease BMD\par Discussed the use of Prolia/Zomata AE include flu like reaction, low calcium, osteonecrosis of jaw (dental clearance needed). Dental clearance form given.\par Advised to call with concerns/symptoms\par RTO in 4 months\par \par

## 2023-04-25 NOTE — RESULTS/DATA
[FreeTextEntry1] : 3/30/22 Pathology: R Breast Infiltrating ductal carcinoma (IDC), Grade 1 [place] Score (1,2,1). Ductal carcinoma in situ (DCIS), cribriform type, intermediate nuclear\par grade. Tumor size (IDC + DCIS) :  9 mm. IDC and DCIS are present less than 1 mm from the anterior margin of\par resection. Additional lateral and anterior margins were negative.  DCIS is focally present less than 1 mm from the lateral margin. Biopsy site changes. 0/2 lymph nodes with clear margins. pT1bN0

## 2023-04-26 LAB
25(OH)D3 SERPL-MCNC: 54.1 NG/ML
ALBUMIN SERPL ELPH-MCNC: 4.2 G/DL
ALP BLD-CCNC: 94 U/L
ALT SERPL-CCNC: 22 U/L
ANION GAP SERPL CALC-SCNC: 12 MMOL/L
AST SERPL-CCNC: 24 U/L
BILIRUB SERPL-MCNC: 0.3 MG/DL
BUN SERPL-MCNC: 24 MG/DL
CALCIUM SERPL-MCNC: 10.4 MG/DL
CHLORIDE SERPL-SCNC: 104 MMOL/L
CO2 SERPL-SCNC: 24 MMOL/L
CREAT SERPL-MCNC: 0.78 MG/DL
EGFR: 79 ML/MIN/1.73M2
GLUCOSE SERPL-MCNC: 98 MG/DL
POTASSIUM SERPL-SCNC: 4.8 MMOL/L
PROT SERPL-MCNC: 6.3 G/DL
SODIUM SERPL-SCNC: 141 MMOL/L

## 2023-05-04 NOTE — OCCUPATIONAL THERAPY INITIAL EVALUATION ADULT - LEVEL OF INDEPENDENCE: DRESS LOWER BODY, OT EVAL
Fax received from [facility] requested by the patient:    Phenytion Sodium Extended 100 MG Capsule.    Fax placed in [email or web address] file in back room.       independent

## 2023-05-08 ENCOUNTER — APPOINTMENT (OUTPATIENT)
Dept: HEMATOLOGY ONCOLOGY | Facility: CLINIC | Age: 78
End: 2023-05-08
Payer: MEDICARE

## 2023-05-08 ENCOUNTER — RESULT REVIEW (OUTPATIENT)
Age: 78
End: 2023-05-08

## 2023-05-08 VITALS
TEMPERATURE: 97.4 F | WEIGHT: 163 LBS | HEART RATE: 83 BPM | DIASTOLIC BLOOD PRESSURE: 72 MMHG | OXYGEN SATURATION: 96 % | HEIGHT: 63 IN | BODY MASS INDEX: 28.88 KG/M2 | SYSTOLIC BLOOD PRESSURE: 185 MMHG

## 2023-05-08 LAB
BASOPHILS # BLD AUTO: 0 K/UL — SIGNIFICANT CHANGE UP (ref 0–0.2)
BASOPHILS NFR BLD AUTO: 0.9 % — SIGNIFICANT CHANGE UP (ref 0–2)
EOSINOPHIL # BLD AUTO: 0.1 K/UL — SIGNIFICANT CHANGE UP (ref 0–0.5)
EOSINOPHIL NFR BLD AUTO: 1.9 % — SIGNIFICANT CHANGE UP (ref 0–6)
HCT VFR BLD CALC: 34 % — LOW (ref 34.5–45)
HGB BLD-MCNC: 11.8 G/DL — SIGNIFICANT CHANGE UP (ref 11.5–15.5)
LYMPHOCYTES # BLD AUTO: 0.7 K/UL — LOW (ref 1–3.3)
LYMPHOCYTES # BLD AUTO: 18.4 % — SIGNIFICANT CHANGE UP (ref 13–44)
MCHC RBC-ENTMCNC: 32 PG — SIGNIFICANT CHANGE UP (ref 27–34)
MCHC RBC-ENTMCNC: 34.8 G/DL — SIGNIFICANT CHANGE UP (ref 32–36)
MCV RBC AUTO: 92 FL — SIGNIFICANT CHANGE UP (ref 80–100)
MONOCYTES # BLD AUTO: 0.4 K/UL — SIGNIFICANT CHANGE UP (ref 0–0.9)
MONOCYTES NFR BLD AUTO: 9.8 % — SIGNIFICANT CHANGE UP (ref 2–14)
NEUTROPHILS # BLD AUTO: 2.8 K/UL — SIGNIFICANT CHANGE UP (ref 1.8–7.4)
NEUTROPHILS NFR BLD AUTO: 68.9 % — SIGNIFICANT CHANGE UP (ref 43–77)
PLATELET # BLD AUTO: 153 K/UL — SIGNIFICANT CHANGE UP (ref 150–400)
RBC # BLD: 3.7 M/UL — LOW (ref 3.8–5.2)
RBC # FLD: 12.6 % — SIGNIFICANT CHANGE UP (ref 10.3–14.5)
WBC # BLD: 4 K/UL — SIGNIFICANT CHANGE UP (ref 3.8–10.5)
WBC # FLD AUTO: 4 K/UL — SIGNIFICANT CHANGE UP (ref 3.8–10.5)

## 2023-05-08 PROCEDURE — 99214 OFFICE O/P EST MOD 30 MIN: CPT

## 2023-05-10 ENCOUNTER — APPOINTMENT (OUTPATIENT)
Dept: PULMONOLOGY | Facility: CLINIC | Age: 78
End: 2023-05-10
Payer: MEDICARE

## 2023-05-10 VITALS — DIASTOLIC BLOOD PRESSURE: 76 MMHG | HEART RATE: 71 BPM | OXYGEN SATURATION: 98 % | SYSTOLIC BLOOD PRESSURE: 140 MMHG

## 2023-05-10 LAB
25(OH)D3 SERPL-MCNC: 63.6 NG/ML
ALBUMIN SERPL ELPH-MCNC: 4.4 G/DL
ALP BLD-CCNC: 116 U/L
ALT SERPL-CCNC: 43 U/L
ANION GAP SERPL CALC-SCNC: 10 MMOL/L
AST SERPL-CCNC: 35 U/L
BILIRUB SERPL-MCNC: 0.3 MG/DL
BUN SERPL-MCNC: 15 MG/DL
CALCIUM SERPL-MCNC: 10.5 MG/DL
CHLORIDE SERPL-SCNC: 107 MMOL/L
CO2 SERPL-SCNC: 28 MMOL/L
CREAT SERPL-MCNC: 0.68 MG/DL
EGFR: 90 ML/MIN/1.73M2
GLUCOSE SERPL-MCNC: 103 MG/DL
POTASSIUM SERPL-SCNC: 4.5 MMOL/L
PROT SERPL-MCNC: 6.6 G/DL
SODIUM SERPL-SCNC: 145 MMOL/L

## 2023-05-10 PROCEDURE — 99214 OFFICE O/P EST MOD 30 MIN: CPT

## 2023-05-10 RX ORDER — METHYLDOPA/HYDROCHLOROTHIAZIDE 250MG-15MG
TABLET ORAL
Refills: 0 | Status: ACTIVE | COMMUNITY

## 2023-05-10 NOTE — CONSULT LETTER
[Dear  ___] : Dear  [unfilled], [Consult Letter:] : I had the pleasure of evaluating your patient, [unfilled]. [Please see my note below.] : Please see my note below. [Consult Closing:] : Thank you very much for allowing me to participate in the care of this patient.  If you have any questions, please do not hesitate to contact me. [Sincerely,] : Sincerely, [FreeTextEntry3] : Vladimir Comer MD, FCCP, D. ABSM\par Pulmonary and Sleep Medicine\par [facility] at [place]

## 2023-05-10 NOTE — PHYSICAL EXAM
[No Acute Distress] : no acute distress [Normal Oropharynx] : normal oropharynx [Normal Appearance] : normal appearance [No Neck Mass] : no neck mass [Normal Rate/Rhythm] : normal rate/rhythm [Murmur ___ / 6] : murmur [unfilled] / 6 [Normal S1, S2] : normal s1, s2 [No Resp Distress] : no resp distress [Clear to Auscultation Bilaterally] : clear to auscultation bilaterally [No Abnormalities] : no abnormalities [Benign] : benign [Normal Gait] : normal gait [No Clubbing] : no clubbing [No Cyanosis] : no cyanosis [No Edema] : no edema [FROM] : FROM [Normal Color/ Pigmentation] : normal color/ pigmentation [No Focal Deficits] : no focal deficits [Oriented x3] : oriented x3 [Normal Affect] : normal affect [TextBox_68] : Improved breath sounds LLL but somewhat diminished.

## 2023-05-10 NOTE — DISCUSSION/SUMMARY
[FreeTextEntry1] : \par #1. PFTs performed previously were essentially normal though subsequent spirometry was reduced post ARIANA lobectomy for adenoCa and now remains stable; f/u PFTs intermittently\par #2. The patient does not appear to require chronic BD therapy at this time as pt does not have obstruction on spirometry and restriction is likely due to lobectomy and XRT changes from breast cancer therapy as now seen on CT\par #3. SOBOE is likely related to weight or deconditioning given normal PFTs and limited ambulation due to knee pain; Albtuerol inhaler for wheeze as needed\par #4. Diet and exercise for weight loss \par #5. Oncology f/u for lung cancer and breast cancer\par #6. ENT f/u for hoarseness\par #7. Cardiology f/u \par #8. S/p J&J Covid vaccine; refusing other vaccines\par #9. Chest CT with small effusion and shift to right but improvement in aeration and no obvious parenchymal lung disease otherwise; will repeat CT with next visit\par #10. F/u in 3 months with repeat CT and PFTs\par #11. Pt to complete Amoxicillin for sinusitis but will add Medrol dose maribeth for mild wheeze\par Reviewed risks of exposure and symptoms of Covid-19 virus, including how the virus is spread and precautions to avoid jered virus.\par \par The patient expressed understanding and agreement with the above recommendations/plan and accepts responsibility to be compliant with recommended testing, therapies, and f/u visits.\par All relevant questions and concerns were addressed.

## 2023-05-10 NOTE — HISTORY OF PRESENT ILLNESS
[On ___] : performed on [unfilled] [Indication ___] : for an indication of [unfilled] [TextBox_4] : Patient with complaints of exercise intolerance post op since resection of lung cancer in 5/2021 and then dx'd with breast cancer in 3/2022\par Some wheeze on awakening but dissipates\par Chest wall tenderness since surgery.\par C/o hoarseness but follows with ENT. \par Pt on Montelukast nightly for ? allergies.\par Pt reports Covid infection in 12/2020 with H/a, fevers, and loss of taste. She did not require therapy and symptoms resolved.\par Pt seen by oncology but no further therapy appears to be required for her lung cancer.\par She is now following with oncology for her recently dx'd breast cancer.\par ? murmur - follows with Dr. Holt\par For XRT per Rad-onc for breast cancer\par On Amoxicillin of sinusitis. [FreeTextEntry9] : Chest CT [FreeTextEntry8] : s/p ARIANA lobectomy

## 2023-05-22 ENCOUNTER — APPOINTMENT (OUTPATIENT)
Dept: SURGERY | Facility: CLINIC | Age: 78
End: 2023-05-22
Payer: MEDICARE

## 2023-05-22 VITALS
BODY MASS INDEX: 28.88 KG/M2 | SYSTOLIC BLOOD PRESSURE: 144 MMHG | TEMPERATURE: 97.4 F | DIASTOLIC BLOOD PRESSURE: 75 MMHG | HEIGHT: 63 IN | WEIGHT: 163 LBS | HEART RATE: 99 BPM | OXYGEN SATURATION: 98 %

## 2023-05-22 DIAGNOSIS — R92.1 MAMMOGRAPHIC CALCIFICATION FOUND ON DIAGNOSTIC IMAGING OF BREAST: ICD-10-CM

## 2023-05-22 PROCEDURE — 99213 OFFICE O/P EST LOW 20 MIN: CPT

## 2023-05-22 NOTE — PHYSICAL EXAM
[Normocephalic] : normocephalic [Atraumatic] : atraumatic [EOMI] : extra ocular movement intact [PERRL] : pupils equal, round and reactive to light [Sclera nonicteric] : sclera nonicteric [Supple] : supple [No Cervical Adenopathy] : no cervical adenopathy [No Supraclavicular Adenopathy] : no supraclavicular adenopathy [Examined in the supine and seated position] : examined in the supine and seated position [Symmetrical] : symmetrical [Grade 1] : Ptosis Grade 1 [No dominant masses] : no dominant masses in right breast  [No dominant masses] : no dominant masses left breast [No Nipple Retraction] : no left nipple retraction [No Nipple Discharge] : no left nipple discharge [No Axillary Lymphadenopathy] : no left axillary lymphadenopathy [No Edema] : no edema [No Rashes] : no rashes [No Ulceration] : no ulceration [de-identified] : Well-healed periareolar incision. Post-surgical and post-radiation changes. There is notable skin thickening/edema to the lower inner quadrant of the right breast. No erythema, no discrete palpable masses.

## 2023-05-22 NOTE — HISTORY OF PRESENT ILLNESS
[FreeTextEntry1] : Oncology History:\par 1. Right breast 11:00 2cm FN IDC grade 1 ER  VT 41-50 [id number] negative\par  -Underwent right breast raul  wide lumpectomy, SLNB on 3/30/2022\par  FINAL PATH: Invasive tumor to measure 9 mm and 0/2 lymph nodes. Margins are clear after having additional margins taken.\par  -Radiation completed 6/28/22\par  -Oncotype 14 \par  -Anastrozole started 7/16/22\par 2. [employer] Genetic Results - No clinically significant mutation identified. VUS BRIP 1 gene\par 3. Right breast calcifications; stable\par 4. Right LIQ palpable lump --> post-treatment changes; BIRADS 3\par     \par \par \par CARE TEAM\par Med Onc - [facility]\par Rad Onc - [place]\par PCP - [place]\par GYN - [facility]\par \par INTERVAL HISTORY:\par (01/30/2023) Patient presents for 6 month followup and review of her recent mammogram/US. She denies breast complaints. She is tolerating the the Anastrozole well. \par \par (05/22/2023): Patient presents for followup. She complains of  a new palpable mass in the lower inner quadrant of her right breast. She has noticed it for about 2 weeks. She went for mammogram/US demonstrating skin thickening /post-treatment changes. \par \par \par HPI: \par Patient initially presented to Dr. Pickett in February 2022 for a newly diagnosed right breast cancer. She had surgery LL lobectomy with Dr Garcia in May 2021 for Lung Cancer. She underwent imaging demonstrating bilateral asymmetries with recommendation of targeted imaging. Left breast asymmetry was thought to be post surgical in nature and recommended for 6 month followup US. The right asymmetry correlated with a 5 mm mass with recommendation of biopsy. Biopsy was performed on 1/12/2022 demonstrating right 11:00 2 cmfn IDC with DCIS grade 1 ER % VT 41-50% Her2 negative ki67 10%.\par \par \par IMAGING:\par 01/11/22 - [facility]: Ultrasound Guided Percutaneous Biopsy of the Right Breast w/ Clip Placement and Post Procedural Diagnostic Right Mammography - FINAL PATHOLOGY: Right breast 11:00, 2 cm from the nipple (coil-shaped marker clip) - Invasive ductal carcinoma, well differentiated. Ductal carcinoma in situ - Solid, Cribriform. Associated calcifications present. The pathology is malignant and is concordant with the imaging findings. Surgical and oncologic consultation and management is recommended. - BIRADS 6: Known Biopsy-Proven Malignancy.\par \par 02/09/22 - [facility]: MRI - IMPRESSION: Biopsy-proven right breast malignancy. No evidence of multifocal or multicentric disease. - BIRADS 6: Known Biopsy-Proven Malignancy.\par \par 01/20/23 - [facility]: Diagnostic Mammogram & Bilateral Breast Ultrasound - IMPRESSION: Right breast post-treatment changes. Probably benign right breast calcifications for which a six-month follow-up right diagnostic mammogram is recommended. No sonographic evidence of malignancy in the right breast. No mammographic or sonographic evidence of malignancy in the left breast. - MAMMOGRAM BIRADS 3: Probably benign. - ULTRASOUND BIRADS 2: Benign.\par \par 05/09/23: [facility]: Diagnostic Mammogram - Impression: Stable lumpectomy scarring in the right superior central breast with multiple associated surgical clips. Stable associated calcifications. Slightly more pronounced skin and trabecular thickening involving the right breast, notable involving the area of palpable concern in the right lower quadrant. No new suspicious microcalcifications, spiculated masses or areas of architectural distortion of either breast. BIRADS 3 \par \par 05/09/23: [facility]: Bilateral Breast Ultrasound - Impression: Mildly more pronounced post-treatment changes, including skin/trabecular thickening and edema at the site of palpable concern in the right lower inner quadrant. skin thickening measuring up to 5 mm at the site of palpable concern in the right breast at 4:00-5:00, 7 cm from the nipple. Short term follow up repeat in 3 months recommended. BIRADS 3

## 2023-05-22 NOTE — ASSESSMENT
[FreeTextEntry1] : 77 year old underwent right breast raul  localized wide lumpectomy with SLNB on 3/30/2022. Final surgical pathology demonstrated the invasive tumor to measure 9 mm and 0/2 lymph nodes. Margins are clear after having additional margins taken. \par \par \par CBE is benign. I reassured her that the findings on exam are consistent with post-treatment changes and imaging suggests this as well. Will repeat right mammogram/US in three months to ensure stability. May consider lymphedema therapy with persistent changes after repeat imaging. \par \par Patient verbalized understanding for all treatment plans discussed. All of her questions were answered to the best of my ability. She was encouraged to call the office if any questions or concerns or come in sooner if needed.\par \par \par \par PLAN\par 1. Right diagnostic mammogram/US August 2023\par 2. Followup in 6 months \par 3. Followup w/med onc and rad onc

## 2023-05-22 NOTE — PAST MEDICAL HISTORY
[Postmenopausal] : The patient is postmenopausal [Total Preg ___] : G[unfilled] [Live Births ___] : P[unfilled]  [Age At Live Birth ___] : Age at live birth: [unfilled] [de-identified] : 53

## 2023-05-23 ENCOUNTER — NON-APPOINTMENT (OUTPATIENT)
Age: 78
End: 2023-05-23

## 2023-05-23 ENCOUNTER — APPOINTMENT (OUTPATIENT)
Dept: RADIATION ONCOLOGY | Facility: CLINIC | Age: 78
End: 2023-05-23
Payer: MEDICARE

## 2023-05-23 PROCEDURE — 99212 OFFICE O/P EST SF 10 MIN: CPT

## 2023-05-24 NOTE — LETTER GREETING
[Dear Doctor] : Dear Doctor, [Follow-Up] : Your patient, [unfilled] was seen in my office today for follow-up [Please see my note below.] : Please see my note below. [FreeTextEntry2] : Rae Mendez DO\par Clarence Boone MD

## 2023-05-24 NOTE — ASSESSMENT
[No evidence of disease] : No evidence of disease [FreeTextEntry1] : area of concern in right breast most likely benign and possible a focus of fat necrosis (which correlated with the boost site).

## 2023-05-24 NOTE — VITALS
[Maximal Pain Intensity: 1/10] : 1/10 [Least Pain Intensity: 0/10] : 0/10 [90: Able to carry normal activity; minor signs or symptoms of disease.] : 90: Able to carry normal activity; minor signs or symptoms of disease.  [Maximal Pain Intensity: 3/10] : 3/10 [Pain Location: ___] : Pain Location: [unfilled] [80: Normal activity with effort; some signs or symptoms of disease.] : 80: Normal activity with effort; some signs or symptoms of disease.  [ECOG Performance Status: 1 - Restricted in physically strenuous activity but ambulatory and able to carry out work of a light or sedentary nature] : Performance Status: 1 - Restricted in physically strenuous activity but ambulatory and able to carry out work of a light or sedentary nature, e.g., light house work, office work

## 2023-05-24 NOTE — HISTORY OF PRESENT ILLNESS
[FreeTextEntry1] : 78 year old woman returns today s/p 5,240 cGy to the right breast for an ER/VT+ Her2- IDC completed 6/28/22\par She notes a palpable  breast pain sore nodule in lower inner quadrant of the right breast -had a mammogram that was consistent with a benign finding. \par Otherwise denies , breast edema, pruritus, arm edema, fatigue & weight loss.\par \par Dr.. Mendez 7/17/23\par Dr. Boone 8/17/23 Anastrozole \par Dr. Comer 5/10/23\par Dr Rubio([place]o) 3/27/23\par \par 1/20/23 Mammo US:\par Impression:\par right breast post treatment changes\par no evidence of disease bilateral breasts\par BIRADS 3 probably benign\par \par 5/9/23 Mammogram /US\par IMPRESSION: right breast post treatment changes. Probably benign right breast calcifications for which  6 month follow up diagnostic mammo is recommended.\par Mammogram BIRADS 3, US BIRADS 2

## 2023-05-24 NOTE — LETTER CLOSING
[Sincerely yours,] : Sincerely yours, [FreeTextEntry3] : Benigno Daniel MD\par Physician in Chief\par Department of Radiation Medicine\par [facility] [place]\par [facility] [place]\par \par  of Radiation Medicine\par [facility]\par at  [facility]\par \par Radiation \par [facility]/\par [facility] at [place]\par [address]\par [place], [place] [address]\par \par Tel: [phone]\par Fax: [phone]\par

## 2023-05-31 ENCOUNTER — NON-APPOINTMENT (OUTPATIENT)
Age: 78
End: 2023-05-31

## 2023-05-31 NOTE — PHYSICAL EXAM
[Normal] : no JVD, no calf tenderness, venous stasis changes, varices [de-identified] : no palpable axillary adenopathy bilaterally.  right breast lower inner quadrant 1cm soft nodularity. No skin changes. No other palpable breast findings. No palpable left breast findings.  [de-identified] : soft, nontender, nondistended

## 2023-05-31 NOTE — ASSESSMENT
[FreeTextEntry1] : 78 year old female with stage IA (pT1bN0) right breast IDC grade 1, 9 mm, ER % UT 41-50% Her2 negative.\par Oncotype Dx RS is 14 with <1% benefit from chemo and risk of distant recurrence with Sinclair or AI is 4%.\par Completed adjuvant breast RT on 6/28/22. \par Began Anastrazole on 7/16/22 after completing RT\par 3/7/23 Dexa: b/l femur neck osteopenia\par \par Tolerating anastrazole well overall. S/p lab sigmoidectomy 2/2 colonic perforation after colonoscopy, h/o diverticulitis. Hgb 11.3 g/dL and improving.\par \par Plan:\par Continue Anastrazole daily\par New right breast finding: Diagnostic bilateral mammo/sono STAT, advised to obtain now and f/u with breast surgeon Dr Andrea miles for further evaluation/management/treatment \par Continue Vitamin D3 OTC daily. Vitamin D level ordered\par Osteopenia  - discussed Prolia. Pending dental clearance. \par Advised to call with concerns/symptoms\par RTO in 3 months with Dr Boone or sooner if needed depending on above.\par

## 2023-05-31 NOTE — CONSULT LETTER
[Dear  ___] : Dear  [unfilled], [Courtesy Letter:] : I had the pleasure of seeing your patient, [unfilled], in my office today. [Please see my note below.] : Please see my note below. [Sincerely,] : Sincerely, [FreeTextEntry3] : Clarence Boone MD\par Medical Oncology/Hematology\par [facility] [place], [facility]\par \par [facility]\par \par

## 2023-05-31 NOTE — HISTORY OF PRESENT ILLNESS
[de-identified] : Ms. White is a 76 year old female with newly diagnosed right breast IDC grade 1 ER % MO 41-50% Her2 negative at age 76.\par \par Patient underwent screening mammogram on 11/29/21 demonstrating right focal asymmetry with queried architectural distortion is indeterminate and left upper asymmetry is indeterminate. Obtained diagnostic mammogram and sonogram on 12/27/21 demonstrating a 5mm hypoechoic mass with associated mild distortion in the right breast at 11:00, 2cm from the nipple with recommendation for biopsy; additional findings of probably benign asymmetry in the upper left breast, far posteriorly, most likely related to postsurgical changes from prior left lung surgery with recommendation for 6 month follow up diagnostic mammogram \par \par 1/11/2022 Underwent right breast 11:00, core biopsy- IDC, well differentiated, Denis score 5/9 (2+ 2 + 1), measuring at least 3 mm, DCIS with low \par nuclear grade with microcalcification, lymphovascular permeation by tumor not seen. ER: % MO: 41-50% Her-2:  Negative (1+) Ki67 10%\par \par 02/08/2022 [employer] Genetic testing- negative no clinically significant mutation identified. Variant of uncertain significance- BRIP1\par \par 02/09/2022 MR Breast- Biopsy-proven right breast malignancy (7 mm). No evidence of multifocal or multicentric disease.\par \par \par Planning for right breast lumpectomy with Dr. Pickett \par Recalls recent DEXA from 2021 was normal\par Has only received Hydroxychloroquine for rheumatoid arthritis. Has intermittent joint pain/stiffness currently \par Following up with pulmonologist Dr. Comer for imaging related to history of lung adenocarcinoma \par \par PMH: Left lower lobe adenocarcinoma s/p lobectomy on 5/4/21,Hypertension, Hyperlipidemia, Rheumatoid Arthritis\par PSH: Left lobectomy on 5/4/21\par SH: History of smoking, quit 30 years ago \par FH: Denies family history of cancer\par Rheumatologist- Dr. Davis \par Pulmonologist- Dr. Comer [de-identified] : Patient presents for follow-up. Started anastrazole on 7/14/22. \par \par Notes evaluated by Dr Boone on 4/25/23 and since then has new palpable finding of right breast lower inner quadrant that appeared since office visit \par Denies trauma/injury \par Denies pain, erythema, skin changes, discharge, wound of breast \par Denies ha, dizziness, pain\par Denies dyspnea, cough\par Denies abdominal pain, nausea, vomiting\par Feels well

## 2023-05-31 NOTE — RESULTS/DATA
[FreeTextEntry1] : 3/30/22 Pathology: R Breast Infiltrating ductal carcinoma (IDC), Grade 1 [place] Score (1,2,1). Ductal carcinoma in situ (DCIS), cribriform type, intermediate nuclear\par grade. Tumor size (IDC + DCIS) :  9 mm. IDC and DCIS are present less than 1 mm from the anterior margin of\par resection. Additional lateral and anterior margins were negative.  DCIS is focally present less than 1 mm from the lateral margin. Biopsy site changes. 0/2 lymph nodes with clear margins. pT1bN0

## 2023-06-13 ENCOUNTER — APPOINTMENT (OUTPATIENT)
Dept: ORTHOPEDIC SURGERY | Facility: CLINIC | Age: 78
End: 2023-06-13
Payer: MEDICARE

## 2023-06-13 VITALS — HEART RATE: 72 BPM | DIASTOLIC BLOOD PRESSURE: 74 MMHG | SYSTOLIC BLOOD PRESSURE: 162 MMHG

## 2023-06-13 PROCEDURE — 20610 DRAIN/INJ JOINT/BURSA W/O US: CPT | Mod: 50

## 2023-06-13 RX ORDER — HYALURONATE SODIUM 20 MG/2 ML
20 SYRINGE (ML) INTRAARTICULAR
Qty: 12 | Refills: 0 | Status: ACTIVE | OUTPATIENT
Start: 2023-06-13

## 2023-06-13 NOTE — PROCEDURE
[de-identified] : I injected the patient's b/l knee today with cortisone 80mg for o.a\par \par I discussed at length with the patient the planned steroid and lidocaine injection. The risks, benefits, convalescence and alternatives were reviewed. The possible side effects discussed included but were not limited to: pain, swelling, heat, bleeding, and redness. Symptoms are generally mild but if they are extensive then contact the office. Giving pain relievers by mouth such as NSAIDs or Tylenol can generally treat the reactions to steroid and lidocaine. Rare cases of infection have been noted. Rash, hives and itching may occur post injection. If you have muscle pain or cramps, flushing and or swelling of the face, rapid heart beat, nausea, dizziness, fever, chills, headache, difficulty breathing, swelling in the arms or legs, or have a prickly feeling of your skin, contact a health care provider immediately. Following this discussion, the knee was prepped with Alcohol and under sterile condition the 80 mg Depo-Medrol and 6 cc Lidocaine injection was performed with a 20 gauge needle through a superolateral injection site. The needle was introduced into the joint, aspiration was performed to ensure intra-articular placement and the medication was injected. Upon withdrawal of the needle the site was cleaned with alcohol and a band aid applied. The patient tolerated the injection well and there were no adverse effects. Post injection instructions included no strenuous activity for 24 hours, cryotherapy and if there are any adverse effects to contact the office. \par

## 2023-06-13 NOTE — HISTORY OF PRESENT ILLNESS
[Pain Location] : pain [Stable] : stable [___ yrs] : [unfilled] year(s) ago [5] : a current pain level of 5/10 [de-identified] : 79 y/o F presents with b/l knee pain. She has more pain in the right knee than in the left knee Last appointment she bilateral knee cortisone injections which gave her lasting relief. Today she is interested in a repeat injections. She has a known hx of bone on bone medial compartmental osteoarthritis of the right knee and near bone on bone medial compartmental osteoarthritis of the left knee. She received cortisone injection at her last appointment which helped for about 5 weeks. She has received PRP injections in the past from Dr. Stephan Chapa. She has pain with bending, walking, and standing. She was recently diagnosed with breast cancer and lung CA\par pt is interested trying h.a injection. \par \par \par \par STEPHEN MACK presents with pain. She states the symptoms are worsening. Pain levels include a current pain level of 7/10, a minimum pain level of 2/10 and a maximum pain level of 8/10. \par \par \par Modifying factors - worsened by bending, worsened by walking and worsened with knee flexion . stairs. Relieving factors include relieved by rest. \par \par

## 2023-06-13 NOTE — DISCUSSION/SUMMARY
[Surgical risks reviewed] : Surgical risks reviewed [de-identified] : Medication risks reviewed. Surgical risks reviewed. 77 y/o F with bone on bone medial compartmental osteoarthritis of the right knee and near bone on bone medial compartmental osteoarthritis of the left knee.  The x-ray showed progression of the disease. The patient is a candidate for a TKA; however, last appointment she discussed that she is not interested in surgery given her recent diagnosis of breast and lung cancer. She is currently undergoing active treatment. For now the pt opted for a repeat bilateral knee cortisone injections which she tolerated well. \par Patient is interested in receiving HA injection which I ordered today. patient will return to office for Euflexxa when it is available.\par \par The patient is a 78 year individual with end stage arthritis of their right knee joint. Based upon the patient's continued symptoms and failure to respond to conservative treatment I have recommended a right total knee arthroplasty for this patient. A long discussion took place with the patient describing what a total joint replacement is and what the procedure would entail. A total knee arthroplasty model, similar to the implant that was used during the operation, was utilized to demonstrate and to discuss the various bearing surfaces of the implants. The hospitalization and post-operative care and rehabilitation were also discussed. The use of perioperative antibiotics and DVT prophylaxis were discussed. The risk, benefits and alternatives to a surgical intervention were discussed at length with the patient. The patient was also advised of risks related to the medical comorbidities, elevated body mass index (BMI), and smoking where applicable. We discussed how to reduce modifiable risk factors and encouraged smoking cessation were applicable.. A lengthy discussion took place to review the most common complications including but not limited to: deep vein thrombosis, pulmonary embolus, heart attack, stroke, infection, wound breakdown, numbness, damage to nerves, tendon, muscles, arteries or other blood vessels, death and other possible complications from anesthesia. The patient was told that we will take steps to minimize these risks by using sterile technique, antibiotics and DVT prophylaxis when appropriate and follow the patient postoperatively in the office setting to monitor progress. The possibility of recurrent pain, no improvement in pain and actual worsening of pain were also discussed with the patient.\par The discharge plan of care focused on the patient going home following surgery. The patient was encouraged to make the necessary arrangements to have someone stay with them when they are discharged home. Following discharge, a home care nurse was to the patient. The home care nurse would open the patient’s home care case and request home physical therapy services. Home physical therapy was to commence following discharge provided it was appropriate and covered by the health insurance benefit plan. \par The benefits of surgery were discussed with the patient including the potential for improving her current clinical condition through operative intervention. Alternatives to surgical intervention including continued conservative management were also discussed in detail. All questions were answered to the satisfaction of the patient. The treatment plan of care, as well as a model of a total knee arthroplasty equivalent to the one that will be used for their total joint replacement, was shared with the patient. The patient agreed to the plan of care as well as the use of implants in their total joint replacement. \par

## 2023-06-13 NOTE — PHYSICAL EXAM
[de-identified] : GENERAL APPEARANCE: Well nourished and hydrated, pleasant, alert, and oriented x 3. Appears their stated age. \par HEENT: Normocephalic, extraocular eye motion intact. Nasal septum midline. Oral cavity clear. External auditory canal clear. \par RESPIRATORY: Breath sounds clear and audible in all lobes. No wheezing, No accessory muscle use.\par CARDIOVASCULAR: No apparent abnormalities. No lower leg edema. No varicosities. Pedal pulses are palpable.\par NEUROLOGIC: Sensation is normal, no muscle weakness in the upper or lower extremities.\par DERMATOLOGIC: No apparent skin lesions, moist, warm, no rash.\par SPINE: Cervical spine appears normal and moves freely; thoracic spine appears normal and moves freely; lumbosacral spine appears normal and moves freely, normal, nontender.\par MUSCULOSKELETAL: Hands, wrists, and elbows are normal and move freely, shoulders are normal and move freely. \par Musculoskeletal:. Right knee exam shows medial joint line tenderness, no effusion, ROM 10-90 degrees. \par Left knee exam shows medial joint line tenderness, no effusion. \par 5/5 motor strength in bilateral lower extremities. Sensory: Intact in bilateral lower extremities. DTRs: Biceps, brachioradialis, triceps, patellar, ankle and plantar 2+ and symmetric bilaterally. Pulses: dorsalis pedis, posterior tibial, femoral, popliteal, and radial 2+ and symmetric bilaterally. \par Constitutional: Alert and in no acute distress, but well-appearing. \par

## 2023-08-01 ENCOUNTER — APPOINTMENT (OUTPATIENT)
Dept: ORTHOPEDIC SURGERY | Facility: CLINIC | Age: 78
End: 2023-08-01
Payer: MEDICARE

## 2023-08-01 PROCEDURE — 99214 OFFICE O/P EST MOD 30 MIN: CPT | Mod: 25

## 2023-08-01 PROCEDURE — 20610 DRAIN/INJ JOINT/BURSA W/O US: CPT | Mod: 50

## 2023-08-01 NOTE — DISCUSSION/SUMMARY
[Surgical risks reviewed] : Surgical risks reviewed [de-identified] : Medication risks reviewed. Surgical risks reviewed. 79 y/o F with bone on bone medial compartmental osteoarthritis of the right knee and near bone on bone medial compartmental osteoarthritis of the left knee.  The x-ray showed progression of the disease. The patient is a candidate for a TKA; however, last appointment she discussed that she is not interested in surgery given her recent diagnosis of breast and lung cancer. She is currently undergoing active cancer treatment.patient is having 5 to 7 weeks of pain relieved with cortisone injection she is interested in HA injection for longer relief I provided 1 out of 3 Euflexxa injection today and she tolerated the procedure well   she will return to office in 1 week for second injection.  The patient is a 78 year individual with end stage arthritis of their right knee joint. Based upon the patient's continued symptoms and failure to respond to conservative treatment I have recommended a right total knee arthroplasty for this patient. A long discussion took place with the patient describing what a total joint replacement is and what the procedure would entail. A total knee arthroplasty model, similar to the implant that was used during the operation, was utilized to demonstrate and to discuss the various bearing surfaces of the implants. The hospitalization and post-operative care and rehabilitation were also discussed. The use of perioperative antibiotics and DVT prophylaxis were discussed. The risk, benefits and alternatives to a surgical intervention were discussed at length with the patient. The patient was also advised of risks related to the medical comorbidities, elevated body mass index (BMI), and smoking where applicable. We discussed how to reduce modifiable risk factors and encouraged smoking cessation were applicable.. A lengthy discussion took place to review the most common complications including but not limited to: deep vein thrombosis, pulmonary embolus, heart attack, stroke, infection, wound breakdown, numbness, damage to nerves, tendon, muscles, arteries or other blood vessels, death and other possible complications from anesthesia. The patient was told that we will take steps to minimize these risks by using sterile technique, antibiotics and DVT prophylaxis when appropriate and follow the patient postoperatively in the office setting to monitor progress. The possibility of recurrent pain, no improvement in pain and actual worsening of pain were also discussed with the patient. The discharge plan of care focused on the patient going home following surgery. The patient was encouraged to make the necessary arrangements to have someone stay with them when they are discharged home. Following discharge, a home care nurse was to the patient. The home care nurse would open the patient's home care case and request home physical therapy services. Home physical therapy was to commence following discharge provided it was appropriate and covered by the health insurance benefit plan.  The benefits of surgery were discussed with the patient including the potential for improving her current clinical condition through operative intervention. Alternatives to surgical intervention including continued conservative management were also discussed in detail. All questions were answered to the satisfaction of the patient. The treatment plan of care, as well as a model of a total knee arthroplasty equivalent to the one that will be used for their total joint replacement, was shared with the patient. The patient agreed to the plan of care as well as the use of implants in their total joint replacement.

## 2023-08-01 NOTE — HISTORY OF PRESENT ILLNESS
[de-identified] : 77 y/o F presents with b/l knee pain. She has more pain in the right knee than in the left knee Last appointment she bilateral knee cortisone injections which gave her lasting relief. Today she is interested in a repeat injections. She has a known hx of bone on bone medial compartmental osteoarthritis of the right knee and near bone on bone medial compartmental osteoarthritis of the left knee. She received cortisone injection at her last appointment which helped for about 5 - 7 weeks. She has received PRP injections in the past from Dr. Stephan Chapa. She has pain with bending, walking, and standing. She was recently diagnosed with breast cancer and lung CA pt is interested trying h.a injection.     STEPHEN SANCHEZHEIDIADALGISA presents with pain. She states the symptoms are worsening. Pain levels include a current pain level of 7/10, a minimum pain level of 2/10 and a maximum pain level of 8/10.    Modifying factors - worsened by bending, worsened by walking and worsened with knee flexion . stairs. Relieving factors include relieved by rest.    [Pain Location] : pain [5] : a current pain level of 5/10

## 2023-08-01 NOTE — PHYSICAL EXAM
[de-identified] : GENERAL APPEARANCE: Well nourished and hydrated, pleasant, alert, and oriented x 3. Appears their stated age.  HEENT: Normocephalic, extraocular eye motion intact. Nasal septum midline. Oral cavity clear. External auditory canal clear.  RESPIRATORY: Breath sounds clear and audible in all lobes. No wheezing, No accessory muscle use. CARDIOVASCULAR: No apparent abnormalities. No lower leg edema. No varicosities. Pedal pulses are palpable. NEUROLOGIC: Sensation is normal, no muscle weakness in the upper or lower extremities. DERMATOLOGIC: No apparent skin lesions, moist, warm, no rash. SPINE: Cervical spine appears normal and moves freely; thoracic spine appears normal and moves freely; lumbosacral spine appears normal and moves freely, normal, nontender. MUSCULOSKELETAL: Hands, wrists, and elbows are normal and move freely, shoulders are normal and move freely.  Musculoskeletal:. Right knee exam shows medial joint line tenderness, no effusion, ROM 10-90 degrees.  Left knee exam shows medial joint line tenderness, no effusion.  5/5 motor strength in bilateral lower extremities. Sensory: Intact in bilateral lower extremities. DTRs: Biceps, brachioradialis, triceps, patellar, ankle and plantar 2+ and symmetric bilaterally. Pulses: dorsalis pedis, posterior tibial, femoral, popliteal, and radial 2+ and symmetric bilaterally.  Constitutional: Alert and in no acute distress, but well-appearing.

## 2023-08-01 NOTE — PROCEDURE
[de-identified] : Pt received bilateral knee Euflexxa injection for o.a Knee injection viscosupplementation: I discussed at length with the patient the planned H.A injection for primary osteoarthritis. The risks, benefits, convalescence and alternatives were reviewed and pt consented for injection. The possible side effects discussed included but were not limited to: pain, swelling, heat, stiffness and fullness. There symptoms are generally mild but if they are extensive then contact the office. Giving pain relievers by mouth such as NSAID's or Tylenol can generally treat the reactions to injection. Rare cases of infection have been noted. Rash, hives and itching may occur post injection. If you have muscle pain or cramps, flushing and or swelling of the face, rapid heart beat, nausea, dizziness, fever, chills, headache, difficulty breathing, swelling in the arms or legs, or have a prickly feeling of your skin, contact a health care provider immediately. Following this discussion, the knee was prepped with alcohol and under sterile condition the injection was performed through a superolateral injection site with a 20 gauge needle. The needle was introduced into the joint, aspiration was performed to ensure intra-articular placement and the medication was injected. Upon withdrawal of the needle the site was cleaned with alcohol and a band aid applied. The patient tolerated the injection well and there were no adverse effects. Post injection instructions included no strenuous activity for 24 hours, cryotherapy and if there are any adverse effects to contact the office.

## 2023-08-08 ENCOUNTER — APPOINTMENT (OUTPATIENT)
Dept: ORTHOPEDIC SURGERY | Facility: CLINIC | Age: 78
End: 2023-08-08
Payer: MEDICARE

## 2023-08-08 PROCEDURE — 20610 DRAIN/INJ JOINT/BURSA W/O US: CPT | Mod: 50

## 2023-08-08 NOTE — REASON FOR VISIT
[Follow-Up Visit] : a follow-up visit for [Other: ____] : [unfilled] [FreeTextEntry2] : b/l knee inj#2 euflexxa LOT#[id number] EXP 09/01/2024

## 2023-08-08 NOTE — PROCEDURE
[de-identified] : Pt received bilateral knee Euflexxa injection for o.a Knee injection viscosupplementation: I discussed at length with the patient the planned H.A injection for primary osteoarthritis. The risks, benefits, convalescence and alternatives were reviewed and pt consented for injection. The possible side effects discussed included but were not limited to: pain, swelling, heat, stiffness and fullness. There symptoms are generally mild but if they are extensive then contact the office. Giving pain relievers by mouth such as NSAID's or Tylenol can generally treat the reactions to injection. Rare cases of infection have been noted. Rash, hives and itching may occur post injection. If you have muscle pain or cramps, flushing and or swelling of the face, rapid heart beat, nausea, dizziness, fever, chills, headache, difficulty breathing, swelling in the arms or legs, or have a prickly feeling of your skin, contact a health care provider immediately. Following this discussion, the knee was prepped with alcohol and under sterile condition the injection was performed through a superolateral injection site with a 20 gauge needle. The needle was introduced into the joint, aspiration was performed to ensure intra-articular placement and the medication was injected. Upon withdrawal of the needle the site was cleaned with alcohol and a band aid applied. The patient tolerated the injection well and there were no adverse effects. Post injection instructions included no strenuous activity for 24 hours, cryotherapy and if there are any adverse effects to contact the office.

## 2023-08-08 NOTE — DISCUSSION/SUMMARY
[Surgical risks reviewed] : Surgical risks reviewed [de-identified] : Medication risks reviewed. Surgical risks reviewed. 77 y/o F with bone on bone medial compartmental osteoarthritis of the right knee and near bone on bone medial compartmental osteoarthritis of the left knee.  The x-ray showed progression of the disease. The patient is a candidate for a TKA; however, last appointment she discussed that she is not interested in surgery given her recent diagnosis of breast and lung cancer. She is currently undergoing active cancer treatment.patient is having 5 to 7 weeks of pain relieved with cortisone injection she is interested in HA injection for longer relief I provided 2 out of 3 Euflexxa injection today and she tolerated the procedure well   she will return to office in 1 week for 3rdinjection. I provided rx for diclofanec   The patient is a 78 year individual with end stage arthritis of their right knee joint. Based upon the patient's continued symptoms and failure to respond to conservative treatment I have recommended a right total knee arthroplasty for this patient. A long discussion took place with the patient describing what a total joint replacement is and what the procedure would entail. A total knee arthroplasty model, similar to the implant that was used during the operation, was utilized to demonstrate and to discuss the various bearing surfaces of the implants. The hospitalization and post-operative care and rehabilitation were also discussed. The use of perioperative antibiotics and DVT prophylaxis were discussed. The risk, benefits and alternatives to a surgical intervention were discussed at length with the patient. The patient was also advised of risks related to the medical comorbidities, elevated body mass index (BMI), and smoking where applicable. We discussed how to reduce modifiable risk factors and encouraged smoking cessation were applicable.. A lengthy discussion took place to review the most common complications including but not limited to: deep vein thrombosis, pulmonary embolus, heart attack, stroke, infection, wound breakdown, numbness, damage to nerves, tendon, muscles, arteries or other blood vessels, death and other possible complications from anesthesia. The patient was told that we will take steps to minimize these risks by using sterile technique, antibiotics and DVT prophylaxis when appropriate and follow the patient postoperatively in the office setting to monitor progress. The possibility of recurrent pain, no improvement in pain and actual worsening of pain were also discussed with the patient. The discharge plan of care focused on the patient going home following surgery. The patient was encouraged to make the necessary arrangements to have someone stay with them when they are discharged home. Following discharge, a home care nurse was to the patient. The home care nurse would open the patient's home care case and request home physical therapy services. Home physical therapy was to commence following discharge provided it was appropriate and covered by the health insurance benefit plan.  The benefits of surgery were discussed with the patient including the potential for improving her current clinical condition through operative intervention. Alternatives to surgical intervention including continued conservative management were also discussed in detail. All questions were answered to the satisfaction of the patient. The treatment plan of care, as well as a model of a total knee arthroplasty equivalent to the one that will be used for their total joint replacement, was shared with the patient. The patient agreed to the plan of care as well as the use of implants in their total joint replacement.

## 2023-08-11 ENCOUNTER — OUTPATIENT (OUTPATIENT)
Dept: OUTPATIENT SERVICES | Facility: HOSPITAL | Age: 78
LOS: 1 days | Discharge: HOME | End: 2023-08-11

## 2023-08-11 DIAGNOSIS — C34.90 MALIGNANT NEOPLASM OF UNSPECIFIED PART OF UNSPECIFIED BRONCHUS OR LUNG: ICD-10-CM

## 2023-08-11 DIAGNOSIS — Z90.2 ACQUIRED ABSENCE OF LUNG [PART OF]: Chronic | ICD-10-CM

## 2023-08-11 DIAGNOSIS — Z98.890 OTHER SPECIFIED POSTPROCEDURAL STATES: Chronic | ICD-10-CM

## 2023-08-15 ENCOUNTER — NON-APPOINTMENT (OUTPATIENT)
Age: 78
End: 2023-08-15

## 2023-08-15 ENCOUNTER — APPOINTMENT (OUTPATIENT)
Dept: ORTHOPEDIC SURGERY | Facility: CLINIC | Age: 78
End: 2023-08-15
Payer: MEDICARE

## 2023-08-15 PROCEDURE — 20610 DRAIN/INJ JOINT/BURSA W/O US: CPT | Mod: 50

## 2023-08-15 NOTE — REASON FOR VISIT
[Follow-Up Visit] : a follow-up visit for [Other: ____] : [unfilled] [FreeTextEntry2] : . b/l knee inj#3 euflexxa LOT#[id number] EXP 09/01/2024.

## 2023-08-15 NOTE — PROCEDURE
[de-identified] : Pt received bilateral knee Euflexxa injection for o.a Knee injection viscosupplementation: I discussed at length with the patient the planned H.A injection for primary osteoarthritis. The risks, benefits, convalescence and alternatives were reviewed and pt consented for injection. The possible side effects discussed included but were not limited to: pain, swelling, heat, stiffness and fullness. There symptoms are generally mild but if they are extensive then contact the office. Giving pain relievers by mouth such as NSAID's or Tylenol can generally treat the reactions to injection. Rare cases of infection have been noted. Rash, hives and itching may occur post injection. If you have muscle pain or cramps, flushing and or swelling of the face, rapid heart beat, nausea, dizziness, fever, chills, headache, difficulty breathing, swelling in the arms or legs, or have a prickly feeling of your skin, contact a health care provider immediately. Following this discussion, the knee was prepped with alcohol and under sterile condition the injection was performed through a superolateral injection site with a 20 gauge needle. The needle was introduced into the joint, aspiration was performed to ensure intra-articular placement and the medication was injected. Upon withdrawal of the needle the site was cleaned with alcohol and a band aid applied. The patient tolerated the injection well and there were no adverse effects. Post injection instructions included no strenuous activity for 24 hours, cryotherapy and if there are any adverse effects to contact the office.

## 2023-08-15 NOTE — DISCUSSION/SUMMARY
[Surgical risks reviewed] : Surgical risks reviewed [de-identified] : Medication risks reviewed. Surgical risks reviewed. 77 y/o F with bone on bone medial compartmental osteoarthritis of the right knee and near bone on bone medial compartmental osteoarthritis of the left knee.  The x-ray showed progression of the disease. The patient is a candidate for a TKA; however, last appointment she discussed that she is not interested in surgery given her recent diagnosis of breast and lung cancer. She is currently undergoing active cancer treatment.patient is having 5 to 7 weeks of pain relieved with cortisone injection she is interested in HA injection for longer relief I provided 3 out of 3 Euflexxa injection today and she tolerated the procedure well   she will return to office for cortisone injection if needed.  I provided PT rx  The patient is a 78 year individual with end stage arthritis of their right knee joint. Based upon the patient's continued symptoms and failure to respond to conservative treatment I have recommended a right total knee arthroplasty for this patient. A long discussion took place with the patient describing what a total joint replacement is and what the procedure would entail. A total knee arthroplasty model, similar to the implant that was used during the operation, was utilized to demonstrate and to discuss the various bearing surfaces of the implants. The hospitalization and post-operative care and rehabilitation were also discussed. The use of perioperative antibiotics and DVT prophylaxis were discussed. The risk, benefits and alternatives to a surgical intervention were discussed at length with the patient. The patient was also advised of risks related to the medical comorbidities, elevated body mass index (BMI), and smoking where applicable. We discussed how to reduce modifiable risk factors and encouraged smoking cessation were applicable.. A lengthy discussion took place to review the most common complications including but not limited to: deep vein thrombosis, pulmonary embolus, heart attack, stroke, infection, wound breakdown, numbness, damage to nerves, tendon, muscles, arteries or other blood vessels, death and other possible complications from anesthesia. The patient was told that we will take steps to minimize these risks by using sterile technique, antibiotics and DVT prophylaxis when appropriate and follow the patient postoperatively in the office setting to monitor progress. The possibility of recurrent pain, no improvement in pain and actual worsening of pain were also discussed with the patient. The discharge plan of care focused on the patient going home following surgery. The patient was encouraged to make the necessary arrangements to have someone stay with them when they are discharged home. Following discharge, a home care nurse was to the patient. The home care nurse would open the patient's home care case and request home physical therapy services. Home physical therapy was to commence following discharge provided it was appropriate and covered by the health insurance benefit plan.  The benefits of surgery were discussed with the patient including the potential for improving her current clinical condition through operative intervention. Alternatives to surgical intervention including continued conservative management were also discussed in detail. All questions were answered to the satisfaction of the patient. The treatment plan of care, as well as a model of a total knee arthroplasty equivalent to the one that will be used for their total joint replacement, was shared with the patient. The patient agreed to the plan of care as well as the use of implants in their total joint replacement.

## 2023-08-17 ENCOUNTER — APPOINTMENT (OUTPATIENT)
Dept: HEMATOLOGY ONCOLOGY | Facility: CLINIC | Age: 78
End: 2023-08-17
Payer: MEDICARE

## 2023-08-17 ENCOUNTER — RESULT REVIEW (OUTPATIENT)
Age: 78
End: 2023-08-17

## 2023-08-17 VITALS
OXYGEN SATURATION: 97 % | BODY MASS INDEX: 29.3 KG/M2 | WEIGHT: 165.35 LBS | SYSTOLIC BLOOD PRESSURE: 156 MMHG | HEART RATE: 70 BPM | TEMPERATURE: 97 F | DIASTOLIC BLOOD PRESSURE: 74 MMHG | HEIGHT: 63 IN

## 2023-08-17 LAB
BASOPHILS # BLD AUTO: 0.1 K/UL — SIGNIFICANT CHANGE UP (ref 0–0.2)
BASOPHILS NFR BLD AUTO: 1.4 % — SIGNIFICANT CHANGE UP (ref 0–2)
EOSINOPHIL # BLD AUTO: 0.1 K/UL — SIGNIFICANT CHANGE UP (ref 0–0.5)
EOSINOPHIL NFR BLD AUTO: 1.9 % — SIGNIFICANT CHANGE UP (ref 0–6)
HCT VFR BLD CALC: 37.2 % — SIGNIFICANT CHANGE UP (ref 34.5–45)
HGB BLD-MCNC: 12.7 G/DL — SIGNIFICANT CHANGE UP (ref 11.5–15.5)
LYMPHOCYTES # BLD AUTO: 0.7 K/UL — LOW (ref 1–3.3)
LYMPHOCYTES # BLD AUTO: 12 % — LOW (ref 13–44)
MCHC RBC-ENTMCNC: 31.9 PG — SIGNIFICANT CHANGE UP (ref 27–34)
MCHC RBC-ENTMCNC: 34.1 G/DL — SIGNIFICANT CHANGE UP (ref 32–36)
MCV RBC AUTO: 93.4 FL — SIGNIFICANT CHANGE UP (ref 80–100)
MONOCYTES # BLD AUTO: 0.5 K/UL — SIGNIFICANT CHANGE UP (ref 0–0.9)
MONOCYTES NFR BLD AUTO: 7.7 % — SIGNIFICANT CHANGE UP (ref 2–14)
NEUTROPHILS # BLD AUTO: 4.7 K/UL — SIGNIFICANT CHANGE UP (ref 1.8–7.4)
NEUTROPHILS NFR BLD AUTO: 77 % — SIGNIFICANT CHANGE UP (ref 43–77)
PLATELET # BLD AUTO: 172 K/UL — SIGNIFICANT CHANGE UP (ref 150–400)
RBC # BLD: 3.98 M/UL — SIGNIFICANT CHANGE UP (ref 3.8–5.2)
RBC # FLD: 11.3 % — SIGNIFICANT CHANGE UP (ref 10.3–14.5)
WBC # BLD: 6.1 K/UL — SIGNIFICANT CHANGE UP (ref 3.8–10.5)
WBC # FLD AUTO: 6.1 K/UL — SIGNIFICANT CHANGE UP (ref 3.8–10.5)

## 2023-08-17 PROCEDURE — 99214 OFFICE O/P EST MOD 30 MIN: CPT

## 2023-08-17 NOTE — ASSESSMENT
[FreeTextEntry1] : 78 year old female with stage IA (pT1bN0) right breast IDC grade 1, 9 mm, ER % CA 41-50% Her2 negative. Oncotype Dx RS is 14 with <1% benefit from chemo and risk of distant recurrence with Sinclair or AI is 4%. Completed adjuvant breast RT on 6/28/22.  Began Anastrazole on 7/16/22 after completing RT 3/7/23 Dexa: b/l femur neck osteopenia    Reviewed:  5/9/23 Mammo/Sono - Mildly more pronounced post-treatment changes. including skin/trabecular thickening and edema at the site of palpable concern in the right lower inner quadrant. Short-term follow- up right diagnostic mammogram/targeted ultrasound is recommended in 3 months. clinical f/u is also recommended. BIRADS 3.  8/1/23 US Right Breast- No sonographic evidence of malignancy in the lower inner right breast. Of note, a right mammogram was recommended as per prior report dated 5/9/23. Right diagnostic mammogram thus advised.   Tolerating anastrozole well overall.   Plan: Continue Anastrazole daily 8/1/23 US -  benign, and advised she get R diagnostic mammo done as well which was recommended both on this US and previous imaging in 5/2023.  Follow up with Breast Surgeon, Dr. Mendez Continue Vitamin D3 OTC daily. Vitamin D level ordered, repeat DEXA 3/2025  Osteopenia  - declined Prolia at this time after discussing with her rheumatologist. NP f/u in 4 months

## 2023-08-17 NOTE — PHYSICAL EXAM
[Normal] : affect appropriate [de-identified] : no palpable axillary adenopathy bilaterally.right breask skin thickening.

## 2023-08-17 NOTE — CONSULT LETTER
[Dear  ___] : Dear  [unfilled], [Courtesy Letter:] : I had the pleasure of seeing your patient, [unfilled], in my office today. [Please see my note below.] : Please see my note below. [Sincerely,] : Sincerely, [FreeTextEntry3] : Clarence Boone MD\par  Medical Oncology/Hematology\par  [facility] [place]\par  \par  [facility] [employer] [facility]\par  \par

## 2023-08-17 NOTE — RESULTS/DATA
[FreeTextEntry1] : 3/30/22 Pathology: R Breast Infiltrating ductal carcinoma (IDC), Grade 1 [place] Score (1,2,1). Ductal carcinoma in situ (DCIS), cribriform type, intermediate nuclear\par  grade. Tumor size (IDC + DCIS) :  9 mm. IDC and DCIS are present less than 1 mm from the anterior margin of\par  resection. Additional lateral and anterior margins were negative.  DCIS is focally present less than 1 mm from the lateral margin. Biopsy site changes. 0/2 lymph nodes with clear margins. pT1bN0

## 2023-08-17 NOTE — HISTORY OF PRESENT ILLNESS
[de-identified] : Patient presents for follow-up.  Started Anastrozole on 7/14/22. Continues on Anastrozole, tolerating well  Reports + intermittent fatigue + occasional hot flashes, not progressed or impacting QOL  Pending root canal, has not received dental clearance  Following with Rheumatology Followed with Dr. Mendez   [de-identified] : Ms. White is a 76 year old female with newly diagnosed right breast IDC grade 1 ER % MT 41-50% Her2 negative at age 76.  Patient underwent screening mammogram on 11/29/21 demonstrating right focal asymmetry with queried architectural distortion is indeterminate and left upper asymmetry is indeterminate. Obtained diagnostic mammogram and sonogram on 12/27/21 demonstrating a 5mm hypoechoic mass with associated mild distortion in the right breast at 11:00, 2cm from the nipple with recommendation for biopsy; additional findings of probably benign asymmetry in the upper left breast, far posteriorly, most likely related to postsurgical changes from prior left lung surgery with recommendation for 6 month follow up diagnostic mammogram   1/11/2022 Underwent right breast 11:00, core biopsy- IDC, well differentiated, Axis score 5/9 (2+ 2 + 1), measuring at least 3 mm, DCIS with low  nuclear grade with microcalcification, lymphovascular permeation by tumor not seen. ER: % MT: 41-50% Her-2:  Negative (1+) Ki67 10%  02/08/2022 [place] Genetic testing- negative no clinically significant mutation identified. Variant of uncertain significance- BRIP1  02/09/2022 MR Breast- Biopsy-proven right breast malignancy (7 mm). No evidence of multifocal or multicentric disease.   Planning for right breast lumpectomy with Dr. Pickett  Recalls recent DEXA from 2021 was normal Has only received Hydroxychloroquine for rheumatoid arthritis. Has intermittent joint pain/stiffness currently  Following up with pulmonologist Dr. Comer for imaging related to history of lung adenocarcinoma   PMH: Left lower lobe adenocarcinoma s/p lobectomy on 5/4/21,Hypertension, Hyperlipidemia, Rheumatoid Arthritis PSH: Left lobectomy on 5/4/2, lab sigmoidectomy 2/2 colonic perforation after colonoscopy, h/o diverticulitis.  SH: History of smoking, quit 30 years ago  FH: Denies family history of cancer Rheumatologist- Dr. Davis  Pulmonologist- Dr. Comer

## 2023-08-17 NOTE — ADDENDUM
[FreeTextEntry1] : Documented by Prudence Slater acting as scribe for Dr. Boone on 08/17/2023.  All Medical record entries made by the Scribe were at my, Dr. Boone, direction and personally dictated by me on 08/17/2023. I have reviewed the chart and agree that the record accurately reflects my personal performance of the history, physical exam, assessment and plan. I have also personally directed, reviewed, and agreed with the discharge instructions.

## 2023-08-18 LAB
25(OH)D3 SERPL-MCNC: 75.6 NG/ML
ALBUMIN SERPL ELPH-MCNC: 4.2 G/DL
ALP BLD-CCNC: 111 U/L
ALT SERPL-CCNC: 23 U/L
ANION GAP SERPL CALC-SCNC: 11 MMOL/L
AST SERPL-CCNC: 25 U/L
BILIRUB SERPL-MCNC: 0.3 MG/DL
BUN SERPL-MCNC: 18 MG/DL
CALCIUM SERPL-MCNC: 10.6 MG/DL
CHLORIDE SERPL-SCNC: 104 MMOL/L
CO2 SERPL-SCNC: 27 MMOL/L
CREAT SERPL-MCNC: 0.67 MG/DL
EGFR: 91 ML/MIN/1.73M2
GLUCOSE SERPL-MCNC: 96 MG/DL
POTASSIUM SERPL-SCNC: 4.7 MMOL/L
PROT SERPL-MCNC: 6.5 G/DL
SODIUM SERPL-SCNC: 142 MMOL/L

## 2023-08-22 ENCOUNTER — NON-APPOINTMENT (OUTPATIENT)
Age: 78
End: 2023-08-22

## 2023-08-29 ENCOUNTER — APPOINTMENT (OUTPATIENT)
Dept: PULMONOLOGY | Facility: CLINIC | Age: 78
End: 2023-08-29
Payer: MEDICARE

## 2023-08-29 VITALS
HEART RATE: 65 BPM | DIASTOLIC BLOOD PRESSURE: 78 MMHG | RESPIRATION RATE: 16 BRPM | SYSTOLIC BLOOD PRESSURE: 126 MMHG | OXYGEN SATURATION: 97 %

## 2023-08-29 VITALS — WEIGHT: 161 LBS | HEIGHT: 63 IN | BODY MASS INDEX: 28.53 KG/M2

## 2023-08-29 DIAGNOSIS — Z87.09 PERSONAL HISTORY OF OTHER DISEASES OF THE RESPIRATORY SYSTEM: ICD-10-CM

## 2023-08-29 DIAGNOSIS — Z87.891 PERSONAL HISTORY OF NICOTINE DEPENDENCE: ICD-10-CM

## 2023-08-29 DIAGNOSIS — Z90.2 ACQUIRED ABSENCE OF LUNG [PART OF]: ICD-10-CM

## 2023-08-29 PROCEDURE — 85018 HEMOGLOBIN: CPT | Mod: QW

## 2023-08-29 PROCEDURE — 94727 GAS DIL/WSHOT DETER LNG VOL: CPT

## 2023-08-29 PROCEDURE — 99214 OFFICE O/P EST MOD 30 MIN: CPT | Mod: 25

## 2023-08-29 PROCEDURE — 94010 BREATHING CAPACITY TEST: CPT

## 2023-08-29 PROCEDURE — 94729 DIFFUSING CAPACITY: CPT

## 2023-08-29 NOTE — CONSULT LETTER
[Dear  ___] : Dear  [unfilled], [Consult Letter:] : I had the pleasure of evaluating your patient, [unfilled]. [Please see my note below.] : Please see my note below. [Consult Closing:] : Thank you very much for allowing me to participate in the care of this patient.  If you have any questions, please do not hesitate to contact me. [Sincerely,] : Sincerely, [FreeTextEntry3] : Vladimir Comer MD, FCCP, D. ABSM\par  Pulmonary and Sleep Medicine\par  [facility] at [place]

## 2023-08-29 NOTE — RESULTS/DATA
[TextEntry] : Chest CT imaging as above.  CXR from 1/4/22 revealed no acute process Echo from 4/22/22 revealed normal EF with trace MR and mild TR and borderline mild pulm HTN

## 2023-08-29 NOTE — DISCUSSION/SUMMARY
[FreeTextEntry1] : #1. PFTs performed previously were essentially normal though subsequent spirometry was reduced post ARIANA lobectomy for adenoCa and now remains stable; f/u PFTs intermittently #2. The patient does not appear to require chronic BD therapy at this time as pt does not have obstruction on spirometry and restriction is likely due to lobectomy and XRT changes from breast cancer therapy as now seen on CT #3. SOBOE is likely related to weight or deconditioning given normal PFTs and limited ambulation due to knee pain; Albtuerol inhaler for wheeze as needed #4. Diet and exercise for weight loss  #5. Oncology f/u for lung cancer and breast cancer #6. ENT f/u for hoarseness #7. Cardiology f/u  #8. S/p J&J Covid vaccine; refusing other vaccines. #9. Chest CT with small effusion and shift to right but improvement in aeration and no obvious parenchymal lung disease otherwise; repeat with stable changes and resolution of effusion. #10. F/u in 6 months with repeat hailee.  The patient expressed understanding and agreement with the above recommendations/plan and accepts responsibility to be compliant with recommended testing, therapies, and f/u visits. All relevant questions and concerns were addressed.

## 2023-08-29 NOTE — HISTORY OF PRESENT ILLNESS
[On ___] : performed on [unfilled] [Indication ___] : for an indication of [unfilled] [TextBox_4] : Patient with complaints of exercise intolerance post op since resection of lung cancer in 5/2021 and then dx'd with breast cancer in 3/2022\par  Some wheeze on awakening but dissipates\par  Chest wall tenderness since surgery.\par  C/o hoarseness but follows with ENT. \par  Pt on Montelukast nightly for ? allergies.\par  Pt reports Covid infection in 12/2020 with H/a, fevers, and loss of taste. She did not require therapy and symptoms resolved.\par  Pt seen by oncology but no further therapy appears to be required for her lung cancer.\par  She is now following with oncology for her recently dx'd breast cancer.\par  ? murmur - follows with Dr. Holt\par  For XRT per Rad-onc for breast cancer\par  On Amoxicillin of sinusitis. [FreeTextEntry9] : Chest CT [FreeTextEntry8] : s/p ARIANA lobectomy [TextEntry] : Chest CT from 5/17/22 revealed essentially stable changes including stable L effusion and stable nodules. Chest CT from 11/15/22 revealed essentially stable changes and ? L effusion vs pleural thickening. Chest CT from 8/15/23 revealed stable likely XRT changes c/w priors with small stable nodules per report.

## 2023-08-29 NOTE — PROCEDURE
[FreeTextEntry1] : PFTs 12/10/21 - normal Spirometry 7/29/21 - normal but reduced from previous due to ARIANA lobectomy Spirometry 2/11/22 - near normal and unchanged from previous PFTs 11/22/22 - Mild restriction likely due to prior ARIANA lobectomy and now XRT changes seen on CT PFTs 8/29/23 - Borderline reduced FVC and mildly reduced FEV1 without obstruction but mildly reduced lung volumes and mildly reduced diffusion capacity which corrected for alveolar volume; overall near baseline.

## 2023-08-29 NOTE — END OF VISIT
[Time Spent: ___ minutes] : I have spent [unfilled] minutes of time on the encounter. [TextEntry] : Discussed with pt at length regarding smoking hx, lung cancer, abnormal CT, soboe (ngozi with stairs); reviewed prior w/u with pt as above.

## 2023-09-05 ENCOUNTER — RX RENEWAL (OUTPATIENT)
Age: 78
End: 2023-09-05

## 2023-09-05 RX ORDER — DICLOFENAC SODIUM 75 MG/1
75 TABLET, DELAYED RELEASE ORAL
Qty: 60 | Refills: 0 | Status: ACTIVE | COMMUNITY
Start: 2023-08-08 | End: 1900-01-01

## 2023-09-19 ENCOUNTER — APPOINTMENT (OUTPATIENT)
Dept: ORTHOPEDIC SURGERY | Facility: CLINIC | Age: 78
End: 2023-09-19
Payer: MEDICARE

## 2023-09-19 VITALS
HEART RATE: 82 BPM | BODY MASS INDEX: 28.53 KG/M2 | SYSTOLIC BLOOD PRESSURE: 125 MMHG | DIASTOLIC BLOOD PRESSURE: 72 MMHG | WEIGHT: 161 LBS | HEIGHT: 63 IN

## 2023-09-19 DIAGNOSIS — R29.898 OTHER SYMPTOMS AND SIGNS INVOLVING THE MUSCULOSKELETAL SYSTEM: ICD-10-CM

## 2023-09-19 PROCEDURE — 20610 DRAIN/INJ JOINT/BURSA W/O US: CPT | Mod: 50

## 2023-09-19 PROCEDURE — 99214 OFFICE O/P EST MOD 30 MIN: CPT | Mod: 25

## 2023-11-10 ENCOUNTER — OUTPATIENT (OUTPATIENT)
Dept: OUTPATIENT SERVICES | Facility: HOSPITAL | Age: 78
LOS: 1 days | Discharge: HOME | End: 2023-11-10

## 2023-11-10 DIAGNOSIS — C34.90 MALIGNANT NEOPLASM OF UNSPECIFIED PART OF UNSPECIFIED BRONCHUS OR LUNG: ICD-10-CM

## 2023-11-10 DIAGNOSIS — Z98.890 OTHER SPECIFIED POSTPROCEDURAL STATES: Chronic | ICD-10-CM

## 2023-11-10 DIAGNOSIS — Z90.2 ACQUIRED ABSENCE OF LUNG [PART OF]: Chronic | ICD-10-CM

## 2023-11-20 ENCOUNTER — APPOINTMENT (OUTPATIENT)
Dept: HEMATOLOGY ONCOLOGY | Facility: CLINIC | Age: 78
End: 2023-11-20

## 2023-11-20 ENCOUNTER — APPOINTMENT (OUTPATIENT)
Dept: SURGERY | Facility: CLINIC | Age: 78
End: 2023-11-20
Payer: MEDICARE

## 2023-11-20 ENCOUNTER — APPOINTMENT (OUTPATIENT)
Dept: RADIATION ONCOLOGY | Facility: CLINIC | Age: 78
End: 2023-11-20
Payer: MEDICARE

## 2023-11-20 VITALS
WEIGHT: 161 LBS | DIASTOLIC BLOOD PRESSURE: 72 MMHG | HEIGHT: 63 IN | SYSTOLIC BLOOD PRESSURE: 156 MMHG | BODY MASS INDEX: 28.53 KG/M2

## 2023-11-20 DIAGNOSIS — Z92.3 PERSONAL HISTORY OF IRRADIATION: ICD-10-CM

## 2023-11-20 DIAGNOSIS — Z98.890 OTHER SPECIFIED POSTPROCEDURAL STATES: ICD-10-CM

## 2023-11-20 PROCEDURE — 99213 OFFICE O/P EST LOW 20 MIN: CPT

## 2023-11-25 PROBLEM — Z98.890 S/P LUMPECTOMY, RIGHT BREAST: Status: ACTIVE | Noted: 2022-04-13

## 2023-11-25 PROBLEM — Z92.3 PERSONAL HISTORY OF RADIATION THERAPY: Status: ACTIVE | Noted: 2022-12-08

## 2023-11-25 PROBLEM — Z98.890 S/P LYMPH NODE BIOPSY: Status: ACTIVE | Noted: 2022-08-13

## 2023-11-27 NOTE — ED ADULT TRIAGE NOTE - MODE OF ARRIVAL
Plan: If areas do not respond appropriately to medications, will consider ILK. Private Auto Detail Level: Zone Continue Regimen: Ketoconazole shampoo- let sit for five minutes then wash, use BIW-TIW. Betamethasone lotion- use BIW-TIW for flare ups as needed. Initiate Treatment: Triamcinolone 0.1% cream- apply BID to aa x 2 weeks then TIW PRN Plan: Recommend switching laundry detergent to sensitive skin, fragrance free brand Otc Regimen: Soaps and emollients handout given and reviewed Walk in

## 2023-12-07 NOTE — HISTORY OF PRESENT ILLNESS
[Pain Location] : pain [Walking] : worsened by walking [Knee Flexion] : worsened with knee flexion [Worsening] : worsening [7] : a current pain level of 7/10 [2] : a minimum pain level of 2/10 [8] : a maximum pain level of 8/10 [Bending] : worsened by bending [Rest] : relieved by rest [de-identified] : 78 y/o F presents with b/l knee pain. She was previously having more pain in the right knee than in the left knee, but she now has more pain in the left knee. She has a known hx of bone on bone medial compartmental osteoarthritis of the right knee and near bone on bone medial compartmental osteoarthritis of the left knee. She received cortisone injection at her last appointment which helped for about 5 weeks. She has received PRP injections in the past from Dr. Stephan Chapa. She has pain with bending, walking, and standing. She was recently diagnosed with breast cancer and is undergoing RT.  [de-identified] : stairs Yes

## 2023-12-11 ENCOUNTER — APPOINTMENT (OUTPATIENT)
Dept: HEMATOLOGY ONCOLOGY | Facility: CLINIC | Age: 78
End: 2023-12-11

## 2023-12-15 ENCOUNTER — APPOINTMENT (OUTPATIENT)
Dept: ORTHOPEDIC SURGERY | Facility: CLINIC | Age: 78
End: 2023-12-15
Payer: MEDICARE

## 2023-12-15 VITALS
SYSTOLIC BLOOD PRESSURE: 163 MMHG | HEART RATE: 88 BPM | WEIGHT: 161 LBS | BODY MASS INDEX: 28.53 KG/M2 | HEIGHT: 63 IN | DIASTOLIC BLOOD PRESSURE: 79 MMHG

## 2023-12-15 PROCEDURE — 99213 OFFICE O/P EST LOW 20 MIN: CPT | Mod: 25

## 2023-12-15 PROCEDURE — 20610 DRAIN/INJ JOINT/BURSA W/O US: CPT | Mod: 50

## 2023-12-15 NOTE — DISCUSSION/SUMMARY
[Surgical risks reviewed] : Surgical risks reviewed [de-identified] : Medication risks reviewed. Surgical risks reviewed. 79 y/o F with bone on bone medial compartmental osteoarthritis of the right knee and near bone on bone medial compartmental osteoarthritis of the left knee.  The x-ray showed progression of the disease. The patient is a candidate for a TKA; She started to show interest doing right total knee arthroplasty for severe rated pain and limited activity She is currently undergoing active  treatment Of the breast cancer and lung cancer. .patient is having 5 to 7 weeks of pain relieved with cortisone injection she failed on  Euflexxa injection . she opted in for b/l knee cortisone injection today and she tolerated the procedure well   She has been using meloxicam given by primary care physician I educated her not to take with diclofenac. Patient will return to office in 3 months for repeat evaluation  The patient is a 78 year individual with end stage arthritis of their right knee joint. Based upon the patient's continued symptoms and failure to respond to conservative treatment I have recommended a right total knee arthroplasty for this patient. A long discussion took place with the patient describing what a total joint replacement is and what the procedure would entail. A total knee arthroplasty model, similar to the implant that was used during the operation, was utilized to demonstrate and to discuss the various bearing surfaces of the implants. The hospitalization and post-operative care and rehabilitation were also discussed. The use of perioperative antibiotics and DVT prophylaxis were discussed. The risk, benefits and alternatives to a surgical intervention were discussed at length with the patient. The patient was also advised of risks related to the medical comorbidities, elevated body mass index (BMI), and smoking where applicable. We discussed how to reduce modifiable risk factors and encouraged smoking cessation were applicable.. A lengthy discussion took place to review the most common complications including but not limited to: deep vein thrombosis, pulmonary embolus, heart attack, stroke, infection, wound breakdown, numbness, damage to nerves, tendon, muscles, arteries or other blood vessels, death and other possible complications from anesthesia. The patient was told that we will take steps to minimize these risks by using sterile technique, antibiotics and DVT prophylaxis when appropriate and follow the patient postoperatively in the office setting to monitor progress. The possibility of recurrent pain, no improvement in pain and actual worsening of pain were also discussed with the patient. The discharge plan of care focused on the patient going home following surgery. The patient was encouraged to make the necessary arrangements to have someone stay with them when they are discharged home. Following discharge, a home care nurse was to the patient. The home care nurse would open the patient's home care case and request home physical therapy services. Home physical therapy was to commence following discharge provided it was appropriate and covered by the health insurance benefit plan.  The benefits of surgery were discussed with the patient including the potential for improving her current clinical condition through operative intervention. Alternatives to surgical intervention including continued conservative management were also discussed in detail. All questions were answered to the satisfaction of the patient. The treatment plan of care, as well as a model of a total knee arthroplasty equivalent to the one that will be used for their total joint replacement, was shared with the patient. The patient agreed to the plan of care as well as the use of implants in their total joint replacement.

## 2023-12-15 NOTE — PROCEDURE
[de-identified] : Patient received b/l  knee 80mg cortisone injection for osteoarthritis  I discussed at length with the patient the planned steroid and lidocaine injection. The risks, benefits, convalescence and alternatives were reviewed. The possible side effects discussed included but were not limited to: pain, swelling, heat, bleeding, and redness. Symptoms are generally mild but if they are extensive then contact the office. Giving pain relievers by mouth such as NSAIDs or Tylenol can generally treat the reactions to steroid and lidocaine. Rare cases of infection have been noted. Rash, hives and itching may occur post injection. If you have muscle pain or cramps, flushing and or swelling of the face, rapid heart beat, nausea, dizziness, fever, chills, headache, difficulty breathing, swelling in the arms or legs, or have a prickly feeling of your skin, contact a health care provider immediately. Following this discussion, the knee was prepped with Alcohol and under sterile condition the 80 mg Depo-Medrol and 6 cc Lidocaine injection was performed with a 20 gauge needle through a superolateral injection site. The needle was introduced into the joint, aspiration was performed to ensure intra-articular placement and the medication was injected. Upon withdrawal of the needle the site was cleaned with alcohol and a band aid applied. The patient tolerated the injection well and there were no adverse effects. Post injection instructions included no strenuous activity for 24 hours, cryotherapy and if there are any adverse effects to contact the office.

## 2023-12-15 NOTE — HISTORY OF PRESENT ILLNESS
[de-identified] : 77 y/o F presents with b/l knee pain. She has more pain in the right knee than in the left knee  she tried h.a injection but it did not help she c/o heaviness and lack of balance. PT for the knee alone is not helping. She has a known hx of bone on bone medial compartmental osteoarthritis of the right knee and near bone on bone medial compartmental osteoarthritis of the left knee. She received cortisone injection at her last appointment which helped for about 5 - 7 weeks. She has received PRP injections in the past from Dr. Stephan Chapa. She has pain with bending, walking, and standing. She was recently diagnosed with breast cancer and lung CA  Patient reports her  passed away 3 days ago, She will be attending  tomorrow hoping she feels better with injection   STEPHEN SANCHEZHEIDIADALGISA presents with pain. She states the symptoms are worsening. Pain levels include a current pain level of 7/10, a minimum pain level of 2/10 and a maximum pain level of 8/10.    Modifying factors - worsened by bending, worsened by walking and worsened with knee flexion . stairs. Relieving factors include relieved by rest.

## 2023-12-15 NOTE — PHYSICAL EXAM
[de-identified] : GENERAL APPEARANCE: Well nourished and hydrated, pleasant, alert, and oriented x 3. Appears their stated age.  HEENT: Normocephalic, extraocular eye motion intact. Nasal septum midline. Oral cavity clear. External auditory canal clear.  RESPIRATORY: Breath sounds clear and audible in all lobes. No wheezing, No accessory muscle use. CARDIOVASCULAR: No apparent abnormalities. No lower leg edema. No varicosities. Pedal pulses are palpable. NEUROLOGIC: Sensation is normal, no muscle weakness in the upper or lower extremities. DERMATOLOGIC: No apparent skin lesions, moist, warm, no rash. SPINE: Cervical spine appears normal and moves freely; thoracic spine appears normal and moves freely; lumbosacral spine appears normal and moves freely, normal, nontender. MUSCULOSKELETAL: Hands, wrists, and elbows are normal and move freely, shoulders are normal and move freely.  Musculoskeletal:. Right knee exam shows medial joint line tenderness, no effusion, ROM 10-90 degrees.  Left knee exam shows medial joint line tenderness, no effusion.  5/5 motor strength in bilateral lower extremities. Sensory: Intact in bilateral lower extremities. DTRs: Biceps, brachioradialis, triceps, patellar, ankle and plantar 2+ and symmetric bilaterally. Pulses: dorsalis pedis, posterior tibial, femoral, popliteal, and radial 2+ and symmetric bilaterally.  Constitutional: Alert and in no acute distress, but well-appearing.

## 2023-12-26 NOTE — ED ADULT NURSE NOTE - CHIEF COMPLAINT QUOTE
Update History & Physical    The patient's History and Physical of   December 13, 2023     Patient will be having : Procedure(s):  KNEE ARTHROSCOPY WITH PARTIAL MEDIAL MENISCECTOMY RIGHT  The surgical site was confirmed by the  patient and me. There was no change. Or updates at this time. Patient did not require any medical  clearance. Patient has been NPO since midnight. No blood thinners in the past 14 days. (Motrin, vitamins)      Patient took  metoprolol and prilosec this am with sip of water. Patient has hx of PONV, denies any personal or family problems with anesthesia. Patient was physically assessed, including cardiovascular and respiratory. Patient understands and wants to proceed with the procedure.      Electronically signed by LOU Angel CNP on 12/26/2023 at 7:20 AM    Note marked for cosign by provider Pt arrives to ED c/o chest heaviness onset few days ago. Pt had thoracic surgery done  by dr. Garcia , had check up in his office yesterday ,  was told has fluid in the lung , symptoms got worse today

## 2024-02-01 ENCOUNTER — APPOINTMENT (OUTPATIENT)
Dept: PULMONOLOGY | Facility: CLINIC | Age: 79
End: 2024-02-01
Payer: MEDICARE

## 2024-02-01 VITALS — HEART RATE: 77 BPM | SYSTOLIC BLOOD PRESSURE: 126 MMHG | DIASTOLIC BLOOD PRESSURE: 66 MMHG | OXYGEN SATURATION: 98 %

## 2024-02-01 VITALS — WEIGHT: 162 LBS | HEIGHT: 62 IN | BODY MASS INDEX: 29.81 KG/M2

## 2024-02-01 DIAGNOSIS — C34.90 MALIGNANT NEOPLASM OF UNSPECIFIED PART OF UNSPECIFIED BRONCHUS OR LUNG: ICD-10-CM

## 2024-02-01 DIAGNOSIS — R06.02 SHORTNESS OF BREATH: ICD-10-CM

## 2024-02-01 DIAGNOSIS — R91.8 OTHER NONSPECIFIC ABNORMAL FINDING OF LUNG FIELD: ICD-10-CM

## 2024-02-01 DIAGNOSIS — R05.9 COUGH, UNSPECIFIED: ICD-10-CM

## 2024-02-01 DIAGNOSIS — E66.9 OBESITY, UNSPECIFIED: ICD-10-CM

## 2024-02-01 DIAGNOSIS — E66.3 OVERWEIGHT: ICD-10-CM

## 2024-02-01 PROCEDURE — 99215 OFFICE O/P EST HI 40 MIN: CPT | Mod: 25

## 2024-02-01 PROCEDURE — 94010 BREATHING CAPACITY TEST: CPT

## 2024-02-01 RX ORDER — METHYLPREDNISOLONE 4 MG/1
4 TABLET ORAL
Qty: 1 | Refills: 1 | Status: DISCONTINUED | COMMUNITY
Start: 2023-05-10 | End: 2024-02-01

## 2024-02-01 RX ORDER — CHROMIUM 200 MCG
1000 TABLET ORAL
Refills: 0 | Status: DISCONTINUED | COMMUNITY
End: 2024-02-01

## 2024-02-01 NOTE — CONSULT LETTER
[Dear  ___] : Dear  [unfilled], [Consult Letter:] : I had the pleasure of evaluating your patient, [unfilled]. [Please see my note below.] : Please see my note below. [Consult Closing:] : Thank you very much for allowing me to participate in the care of this patient.  If you have any questions, please do not hesitate to contact me. [Sincerely,] : Sincerely, [FreeTextEntry3] : lVadimir Comer MD, FCCP, D. ABSM\par  Pulmonary and Sleep Medicine\par  [facility] at [place]

## 2024-02-01 NOTE — DISCUSSION/SUMMARY
[FreeTextEntry1] : #1. PFTs performed previously were essentially normal though subsequent spirometry was reduced post ARIANA lobectomy for adenoCa and now remains stable; f/u PFTs intermittently. #2. The patient does not appear to require chronic BD therapy at this time as pt does not have obstruction on spirometry and restriction is likely due to lobectomy and XRT changes from breast cancer therapy as now seen on CT. Follow imaging studies as needed. #3. SOBOE is likely related to weight or deconditioning given normal PFTs and limited ambulation due to knee pain; Albtuerol inhaler for wheeze as needed. #4. Diet and exercise for weight loss. #5. Oncology f/u for lung cancer and breast cancer. #6. ENT f/u for hoarseness. #7. Cardiology f/u. #8. S/p J&J Covid vaccine; refusing other vaccines. #9. Chest CT with small effusion and shift to right but improvement in aeration and no obvious parenchymal lung disease otherwise; repeat with stable changes and resolution of effusion. Follow chest imaging as needed. #10. F/u in 6 months with repeat PFTs.  The patient expressed understanding and agreement with the above recommendations/plan and accepts responsibility to be compliant with recommended testing, therapies, and f/u visits. All relevant questions and concerns were addressed.

## 2024-02-01 NOTE — PROCEDURE
[FreeTextEntry1] : PFTs 12/10/21 - normal Spirometry 7/29/21 - normal but reduced from previous due to ARIANA lobectomy. Spirometry 2/11/22 - near normal and unchanged from previous PFTs 11/22/22 - Mild restriction likely due to prior ARIANA lobectomy and now XRT changes seen on CT. PFTs 8/29/23 - Borderline reduced FVC and mildly reduced FEV1 without obstruction but mildly reduced lung volumes and mildly reduced diffusion capacity which corrected for alveolar volume; overall near baseline. [place] 2/1/24 - Near normal FVC and FEV1 without obstruction; near baseline.

## 2024-02-09 ENCOUNTER — OUTPATIENT (OUTPATIENT)
Dept: OUTPATIENT SERVICES | Facility: HOSPITAL | Age: 79
LOS: 1 days | Discharge: HOME | End: 2024-02-09

## 2024-02-09 DIAGNOSIS — C34.90 MALIGNANT NEOPLASM OF UNSPECIFIED PART OF UNSPECIFIED BRONCHUS OR LUNG: ICD-10-CM

## 2024-02-09 DIAGNOSIS — Z90.2 ACQUIRED ABSENCE OF LUNG [PART OF]: Chronic | ICD-10-CM

## 2024-02-09 DIAGNOSIS — Z98.890 OTHER SPECIFIED POSTPROCEDURAL STATES: Chronic | ICD-10-CM

## 2024-02-14 ENCOUNTER — RESULT REVIEW (OUTPATIENT)
Age: 79
End: 2024-02-14

## 2024-02-14 ENCOUNTER — APPOINTMENT (OUTPATIENT)
Dept: HEMATOLOGY ONCOLOGY | Facility: CLINIC | Age: 79
End: 2024-02-14
Payer: MEDICARE

## 2024-02-14 VITALS
HEART RATE: 79 BPM | WEIGHT: 161 LBS | SYSTOLIC BLOOD PRESSURE: 149 MMHG | OXYGEN SATURATION: 98 % | HEIGHT: 62 IN | BODY MASS INDEX: 29.63 KG/M2 | DIASTOLIC BLOOD PRESSURE: 80 MMHG

## 2024-02-14 DIAGNOSIS — C50.911 MALIGNANT NEOPLASM OF UNSPECIFIED SITE OF RIGHT FEMALE BREAST: ICD-10-CM

## 2024-02-14 DIAGNOSIS — R92.8 OTHER ABNORMAL AND INCONCLUSIVE FINDINGS ON DIAGNOSTIC IMAGING OF BREAST: ICD-10-CM

## 2024-02-14 DIAGNOSIS — M85.851 OTHER SPECIFIED DISORDERS OF BONE DENSITY AND STRUCTURE, RIGHT THIGH: ICD-10-CM

## 2024-02-14 DIAGNOSIS — Z79.811 LONG TERM (CURRENT) USE OF AROMATASE INHIBITORS: ICD-10-CM

## 2024-02-14 DIAGNOSIS — M85.852 OTHER SPECIFIED DISORDERS OF BONE DENSITY AND STRUCTURE, RIGHT THIGH: ICD-10-CM

## 2024-02-14 LAB
BASOPHILS # BLD AUTO: 0.1 K/UL — SIGNIFICANT CHANGE UP (ref 0–0.2)
BASOPHILS NFR BLD AUTO: 1.3 % — SIGNIFICANT CHANGE UP (ref 0–2)
EOSINOPHIL # BLD AUTO: 0.1 K/UL — SIGNIFICANT CHANGE UP (ref 0–0.5)
EOSINOPHIL NFR BLD AUTO: 2.3 % — SIGNIFICANT CHANGE UP (ref 0–6)
HCT VFR BLD CALC: 36 % — SIGNIFICANT CHANGE UP (ref 34.5–45)
HGB BLD-MCNC: 12.5 G/DL — SIGNIFICANT CHANGE UP (ref 11.5–15.5)
LYMPHOCYTES # BLD AUTO: 1.1 K/UL — SIGNIFICANT CHANGE UP (ref 1–3.3)
LYMPHOCYTES # BLD AUTO: 18.1 % — SIGNIFICANT CHANGE UP (ref 13–44)
MCHC RBC-ENTMCNC: 32.1 PG — SIGNIFICANT CHANGE UP (ref 27–34)
MCHC RBC-ENTMCNC: 34.6 G/DL — SIGNIFICANT CHANGE UP (ref 32–36)
MCV RBC AUTO: 92.7 FL — SIGNIFICANT CHANGE UP (ref 80–100)
MONOCYTES # BLD AUTO: 0.5 K/UL — SIGNIFICANT CHANGE UP (ref 0–0.9)
MONOCYTES NFR BLD AUTO: 7.8 % — SIGNIFICANT CHANGE UP (ref 2–14)
NEUTROPHILS # BLD AUTO: 4.2 K/UL — SIGNIFICANT CHANGE UP (ref 1.8–7.4)
NEUTROPHILS NFR BLD AUTO: 70.5 % — SIGNIFICANT CHANGE UP (ref 43–77)
PLATELET # BLD AUTO: 158 K/UL — SIGNIFICANT CHANGE UP (ref 150–400)
RBC # BLD: 3.88 M/UL — SIGNIFICANT CHANGE UP (ref 3.8–5.2)
RBC # FLD: 11.5 % — SIGNIFICANT CHANGE UP (ref 10.3–14.5)
WBC # BLD: 6 K/UL — SIGNIFICANT CHANGE UP (ref 3.8–10.5)
WBC # FLD AUTO: 6 K/UL — SIGNIFICANT CHANGE UP (ref 3.8–10.5)

## 2024-02-14 PROCEDURE — 99214 OFFICE O/P EST MOD 30 MIN: CPT

## 2024-02-14 NOTE — CONSULT LETTER
[Dear  ___] : Dear  [unfilled], [Courtesy Letter:] : I had the pleasure of seeing your patient, [unfilled], in my office today. [Please see my note below.] : Please see my note below. [Sincerely,] : Sincerely, [FreeTextEntry3] : Clarence Boone MD\par  Medical Oncology/Hematology\par  [facility] [place], [facility]\par  \par  [facility] [employer] at [facility]\par  \par

## 2024-02-14 NOTE — ASSESSMENT
[FreeTextEntry1] : 78 year old female with stage IA (pT1bN0) right breast IDC grade 1, 9 mm, ER % MD 41-50% Her2 negative. Oncotype Dx RS is 14 with <1% benefit from chemo and risk of distant recurrence with Sinclair or AI is 4%. Completed adjuvant breast RT on 6/28/22.  Began Anastrazole on 7/16/22 after completing RT 3/7/23 Dexa: b/l femur neck osteopenia  8/21/23 Mammo Right - Post treatment changes. No mammographic evidence of malignancy  Tolerating anastrozole well overall.   Plan: Continue Anastrazole daily Breast imaging per Dr. Mendez Osteopenia - Stopped Vitamin D3 per PCP and on Vit K2 for high calcium?. Vitamin D level ordered, repeat DEXA 3/2025   f/u in 6 months

## 2024-02-14 NOTE — PHYSICAL EXAM
[Normal] : affect appropriate [de-identified] : no palpable axillary adenopathy bilaterally. No palpable breast mass.

## 2024-02-14 NOTE — RESULTS/DATA
[FreeTextEntry1] : 3/30/22 Pathology: R Breast Infiltrating ductal carcinoma (IDC), Grade 1 Devine Score (1,2,1). Ductal carcinoma in situ (DCIS), cribriform type, intermediate nuclear\par  grade. Tumor size (IDC + DCIS) :  9 mm. IDC and DCIS are present less than 1 mm from the anterior margin of\par  resection. Additional lateral and anterior margins were negative.  DCIS is focally present less than 1 mm from the lateral margin. Biopsy site changes. 0/2 lymph nodes with clear margins. pT1bN0

## 2024-02-14 NOTE — HISTORY OF PRESENT ILLNESS
[de-identified] : Ms. White is a 76 year old female with newly diagnosed right breast IDC grade 1 ER % OK 41-50% Her2 negative at age 76.  Patient underwent screening mammogram on 11/29/21 demonstrating right focal asymmetry with queried architectural distortion is indeterminate and left upper asymmetry is indeterminate. Obtained diagnostic mammogram and sonogram on 12/27/21 demonstrating a 5mm hypoechoic mass with associated mild distortion in the right breast at 11:00, 2cm from the nipple with recommendation for biopsy; additional findings of probably benign asymmetry in the upper left breast, far posteriorly, most likely related to postsurgical changes from prior left lung surgery with recommendation for 6 month follow up diagnostic mammogram   1/11/2022 Underwent right breast 11:00, core biopsy- IDC, well differentiated, Claremont score 5/9 (2+ 2 + 1), measuring at least 3 mm, DCIS with low  nuclear grade with microcalcification, lymphovascular permeation by tumor not seen. ER: % OK: 41-50% Her-2:  Negative (1+) Ki67 10%  02/08/2022 [place] Genetic testing- negative no clinically significant mutation identified. Variant of uncertain significance- BRIP1  02/09/2022 MR Breast- Biopsy-proven right breast malignancy (7 mm). No evidence of multifocal or multicentric disease.   Planning for right breast lumpectomy with Dr. Pickett  Recalls recent DEXA from 2021 was normal Has only received Hydroxychloroquine for rheumatoid arthritis. Has intermittent joint pain/stiffness currently  Following up with pulmonologist Dr. Comer for imaging related to history of lung adenocarcinoma   PMH: Left lower lobe adenocarcinoma s/p lobectomy on 5/4/21,Hypertension, Hyperlipidemia, Rheumatoid Arthritis PSH: Left lobectomy on 5/4/2, lab sigmoidectomy 2/2 colonic perforation after colonoscopy, h/o diverticulitis.  SH: History of smoking, quit 30 years ago  FH: Denies family history of cancer Rheumatologist- Dr. Davis  Pulmonologist- Dr. Comer [de-identified] : Patient presents for follow-up.  Started Anastrozole on 7/14/22. Continues on Anastrozole, tolerating well  Pt reports feeling fine denies complications with endocrine therapy reports some fatigue Reports some arthritic pain on knees, not changed from baseline Denies hot flashes, vaginal dryness Last saw RHEUM 1 month ago Continues f/u with Dr. Mendez Per PCP she was taking Vit D, but changed to Vit K2

## 2024-02-14 NOTE — ADDENDUM
[FreeTextEntry1] : Documented by Delmis Rodriguez acting as scribe for Dr. Boone on  02/14/2024.    All Medical record entries made by the Scribe were at my, Dr. Boone's, direction and personally dictated by me on  02/14/2024. I have reviewed the chart and agree that the record accurately reflects my personal performance of the history, physical exam, assessment and plan. I have also personally directed, reviewed, and agreed with the discharge instructions.

## 2024-02-15 LAB
25(OH)D3 SERPL-MCNC: 38.4 NG/ML
ALBUMIN SERPL ELPH-MCNC: 4.2 G/DL
ALP BLD-CCNC: 113 U/L
ALT SERPL-CCNC: 17 U/L
ANION GAP SERPL CALC-SCNC: 9 MMOL/L
AST SERPL-CCNC: 20 U/L
BILIRUB SERPL-MCNC: 0.3 MG/DL
BUN SERPL-MCNC: 25 MG/DL
CALCIUM SERPL-MCNC: 10.3 MG/DL
CHLORIDE SERPL-SCNC: 106 MMOL/L
CO2 SERPL-SCNC: 28 MMOL/L
CREAT SERPL-MCNC: 0.66 MG/DL
EGFR: 91 ML/MIN/1.73M2
GLUCOSE SERPL-MCNC: 97 MG/DL
POTASSIUM SERPL-SCNC: 4.5 MMOL/L
PROT SERPL-MCNC: 6.3 G/DL
SODIUM SERPL-SCNC: 142 MMOL/L

## 2024-02-26 NOTE — H&P ADULT - VTE RISK ASSESSMENT
[FreeTextEntry3] :   I, Nicole Schilling MD, personally performed the evaluation and management (E/M) services for this patient. That E/M includes conducting the clinically appropriate initial history &/or exam, assessing all conditions, and establishing the plan of care. I have also independently reviewed the medical records and imaging at the time of this office visit, and discussed the above mentioned images with interpretations with the patient. Today, GHADA Trimble was here to participate in the visit & follow plan of care established by me. <<--- Click to launch

## 2024-03-15 ENCOUNTER — APPOINTMENT (OUTPATIENT)
Dept: ORTHOPEDIC SURGERY | Facility: CLINIC | Age: 79
End: 2024-03-15
Payer: MEDICARE

## 2024-03-15 VITALS
BODY MASS INDEX: 29.63 KG/M2 | HEART RATE: 83 BPM | HEIGHT: 62 IN | WEIGHT: 161 LBS | DIASTOLIC BLOOD PRESSURE: 75 MMHG | SYSTOLIC BLOOD PRESSURE: 145 MMHG

## 2024-03-15 DIAGNOSIS — M17.0 BILATERAL PRIMARY OSTEOARTHRITIS OF KNEE: ICD-10-CM

## 2024-03-15 PROCEDURE — 20610 DRAIN/INJ JOINT/BURSA W/O US: CPT | Mod: 50

## 2024-03-15 NOTE — DISCUSSION/SUMMARY
[Surgical risks reviewed] : Surgical risks reviewed [de-identified] : repeat injection  Medication risks reviewed. Surgical risks reviewed. 79 y/o F with bone on bone medial compartmental osteoarthritis of the right knee and near bone on bone medial compartmental osteoarthritis of the left knee.  The x-ray showed progression of the disease. The patient is a candidate for a TKA; She started to show interest doing right total knee arthroplasty for severe rated pain and limited activity She is currently undergoing active  treatment Of the breast cancer and lung cancer. .patient is having 5 to 7 weeks of pain relieved with cortisone injection she failed on  Euflexxa injection . she opted in for b/l knee cortisone injection today and she tolerated the procedure well   She has been using meloxicam given by primary care physician I educated her not to take with diclofenac. Patient will return to office in 3 months for repeat evaluation  The patient is a 78 year individual with end stage arthritis of their right knee joint. Based upon the patient's continued symptoms and failure to respond to conservative treatment I have recommended a right total knee arthroplasty for this patient. A long discussion took place with the patient describing what a total joint replacement is and what the procedure would entail. A total knee arthroplasty model, similar to the implant that was used during the operation, was utilized to demonstrate and to discuss the various bearing surfaces of the implants. The hospitalization and post-operative care and rehabilitation were also discussed. The use of perioperative antibiotics and DVT prophylaxis were discussed. The risk, benefits and alternatives to a surgical intervention were discussed at length with the patient. The patient was also advised of risks related to the medical comorbidities, elevated body mass index (BMI), and smoking where applicable. We discussed how to reduce modifiable risk factors and encouraged smoking cessation were applicable.. A lengthy discussion took place to review the most common complications including but not limited to: deep vein thrombosis, pulmonary embolus, heart attack, stroke, infection, wound breakdown, numbness, damage to nerves, tendon, muscles, arteries or other blood vessels, death and other possible complications from anesthesia. The patient was told that we will take steps to minimize these risks by using sterile technique, antibiotics and DVT prophylaxis when appropriate and follow the patient postoperatively in the office setting to monitor progress. The possibility of recurrent pain, no improvement in pain and actual worsening of pain were also discussed with the patient. The discharge plan of care focused on the patient going home following surgery. The patient was encouraged to make the necessary arrangements to have someone stay with them when they are discharged home. Following discharge, a home care nurse was to the patient. The home care nurse would open the patient's home care case and request home physical therapy services. Home physical therapy was to commence following discharge provided it was appropriate and covered by the health insurance benefit plan.  The benefits of surgery were discussed with the patient including the potential for improving her current clinical condition through operative intervention. Alternatives to surgical intervention including continued conservative management were also discussed in detail. All questions were answered to the satisfaction of the patient. The treatment plan of care, as well as a model of a total knee arthroplasty equivalent to the one that will be used for their total joint replacement, was shared with the patient. The patient agreed to the plan of care as well as the use of implants in their total joint replacement.

## 2024-03-15 NOTE — PROCEDURE
[de-identified] : Patient received b/l  knee 80mg cortisone injection for osteoarthritis  I discussed at length with the patient the planned steroid and lidocaine injection. The risks, benefits, convalescence and alternatives were reviewed. The possible side effects discussed included but were not limited to: pain, swelling, heat, bleeding, and redness. Symptoms are generally mild but if they are extensive then contact the office. Giving pain relievers by mouth such as NSAIDs or Tylenol can generally treat the reactions to steroid and lidocaine. Rare cases of infection have been noted. Rash, hives and itching may occur post injection. If you have muscle pain or cramps, flushing and or swelling of the face, rapid heart beat, nausea, dizziness, fever, chills, headache, difficulty breathing, swelling in the arms or legs, or have a prickly feeling of your skin, contact a health care provider immediately. Following this discussion, the knee was prepped with Alcohol and under sterile condition the 80 mg Depo-Medrol and 6 cc Lidocaine injection was performed with a 20 gauge needle through a superolateral injection site. The needle was introduced into the joint, aspiration was performed to ensure intra-articular placement and the medication was injected. Upon withdrawal of the needle the site was cleaned with alcohol and a band aid applied. The patient tolerated the injection well and there were no adverse effects. Post injection instructions included no strenuous activity for 24 hours, cryotherapy and if there are any adverse effects to contact the office.

## 2024-03-15 NOTE — HISTORY OF PRESENT ILLNESS
[de-identified] : 79 y/o F presents with b/l knee pain. She has more pain in the right knee than in the left knee  she tried h.a injection but it did not help she c/o heaviness and lack of balance. PT for the knee alone is not helping. She has a known hx of bone on bone medial compartmental osteoarthritis of the right knee and near bone on bone medial compartmental osteoarthritis of the left knee. She received cortisone injection at her last appointment which helped for about 5 - 7 weeks. She has received PRP injections in the past from Dr. Stephan Chapa. She has pain with bending, walking, and standing. She was recently diagnosed with breast cancer and lung CA  Patient reports her  passed away 3 days ago, She will be attending  tomorrow hoping she feels better with injection   STEPHEN SANCHEZHEIDIADALGISA presents with pain. She states the symptoms are worsening. Pain levels include a current pain level of 7/10, a minimum pain level of 2/10 and a maximum pain level of 8/10.    Modifying factors - worsened by bending, worsened by walking and worsened with knee flexion . stairs. Relieving factors include relieved by rest.    [Pain Location] : pain [3] : a current pain level of 3/10

## 2024-03-15 NOTE — REVIEW OF SYSTEMS
[Joint Pain] : joint pain [Joint Stiffness] : joint stiffness [Negative] : Psychiatric [FreeTextEntry9] : b/l knee

## 2024-03-15 NOTE — PHYSICAL EXAM
[de-identified] : GENERAL APPEARANCE: Well nourished and hydrated, pleasant, alert, and oriented x 3. Appears their stated age.  HEENT: Normocephalic, extraocular eye motion intact. Nasal septum midline. Oral cavity clear. External auditory canal clear.  RESPIRATORY: Breath sounds clear and audible in all lobes. No wheezing, No accessory muscle use. CARDIOVASCULAR: No apparent abnormalities. No lower leg edema. No varicosities. Pedal pulses are palpable. NEUROLOGIC: Sensation is normal, no muscle weakness in the upper or lower extremities. DERMATOLOGIC: No apparent skin lesions, moist, warm, no rash. SPINE: Cervical spine appears normal and moves freely; thoracic spine appears normal and moves freely; lumbosacral spine appears normal and moves freely, normal, nontender. MUSCULOSKELETAL: Hands, wrists, and elbows are normal and move freely, shoulders are normal and move freely.  Musculoskeletal:. Right knee exam shows medial joint line tenderness, no effusion, ROM 10-90 degrees.  Left knee exam shows medial joint line tenderness, no effusion.  5/5 motor strength in bilateral lower extremities. Sensory: Intact in bilateral lower extremities. DTRs: Biceps, brachioradialis, triceps, patellar, ankle and plantar 2+ and symmetric bilaterally. Pulses: dorsalis pedis, posterior tibial, femoral, popliteal, and radial 2+ and symmetric bilaterally.  Constitutional: Alert and in no acute distress, but well-appearing.

## 2024-04-02 RX ORDER — ANASTROZOLE TABLETS 1 MG/1
1 TABLET ORAL
Qty: 90 | Refills: 3 | Status: ACTIVE | COMMUNITY
Start: 2022-04-28 | End: 1900-01-01

## 2024-05-09 NOTE — ED ADULT NURSE NOTE - HIV OFFER
Opt out Recognizes 2 fingers or can read (II)/Smiles, shows teeth, opens mouth, sticks out tongue (V, VII, XI)/Normal speech (IX, X, XII)/Extraocular Eye Movement Intact  (III, IV, VI)/Shoulder shrug (XI)/Hearing intact (VIII)

## 2024-05-20 ENCOUNTER — APPOINTMENT (OUTPATIENT)
Dept: RADIATION ONCOLOGY | Facility: CLINIC | Age: 79
End: 2024-05-20

## 2024-05-21 ENCOUNTER — APPOINTMENT (OUTPATIENT)
Dept: RADIATION ONCOLOGY | Facility: CLINIC | Age: 79
End: 2024-05-21
Payer: MEDICARE

## 2024-05-21 VITALS
OXYGEN SATURATION: 94 % | BODY MASS INDEX: 30.18 KG/M2 | RESPIRATION RATE: 16 BRPM | DIASTOLIC BLOOD PRESSURE: 77 MMHG | HEIGHT: 62 IN | WEIGHT: 164 LBS | SYSTOLIC BLOOD PRESSURE: 166 MMHG | HEART RATE: 75 BPM

## 2024-05-21 PROCEDURE — 99213 OFFICE O/P EST LOW 20 MIN: CPT

## 2024-05-21 RX ORDER — ALBUTEROL SULFATE 90 UG/1
108 (90 BASE) INHALANT RESPIRATORY (INHALATION)
Qty: 1 | Refills: 2 | Status: DISCONTINUED | COMMUNITY
Start: 2023-05-10 | End: 2024-05-21

## 2024-05-24 NOTE — ASSESSMENT
[No evidence of disease] : No evidence of disease [FreeTextEntry1] : No appreciable treatment related sequelae.

## 2024-05-24 NOTE — PHYSICAL EXAM
[Normal] : oriented to person, place and time, the affect was normal, the mood was normal and not anxious [de-identified] : very good cosmesis

## 2024-05-24 NOTE — REVIEW OF SYSTEMS
[Fatigue] : fatigue [Fatigue: Grade 1 - Fatigue relieved by rest] : Fatigue: Grade 1 - Fatigue relieved by rest [Negative] : Musculoskeletal [FreeTextEntry9] : b/l knees

## 2024-05-24 NOTE — LETTER GREETING
[Dear Doctor] : Dear Doctor, [Follow-Up] : Your patient, [unfilled] was seen in my office today for follow-up [Please see my note below.] : Please see my note below. [FreeTextEntry2] : MD Rae Arellano, DO

## 2024-05-24 NOTE — LETTER CLOSING
[Sincerely yours,] : Sincerely yours, [FreeTextEntry3] : Benigno Daniel MD Physician in Chief Department of Radiation Medicine [facility]   of Radiation Medicine [employer] at  [facility]  Radiation  [facility]/ [facility] at [place] [address]  Tel: [phone] Fax: [phone]

## 2024-05-24 NOTE — HISTORY OF PRESENT ILLNESS
[FreeTextEntry1] : 78 year old woman returns today in follow up visit. She is s/p 5,240 cGy to the right breast for an ER/UT+ Her2- IDC completed 6/28/22  Dr.. Mendez 11/20/23 Dr. Boone 2/14/24 Anastrozole  Dr. Comer 2/1/24 Dr Rubio(colo) 3/27/23  1/20/23 Mammo US: Impression: right breast post treatment changes. No evidence of disease bilateral breasts. BIRADS 3 probably benign.  8/21/23 Mammogram /US. Showed post treatment changes. No mammographic evidence of malignancy. Bl-Rads 2 Benign. Follow up recommend bilateral diagnostic Mammo in one year.  5/21/24; Today she presents in follow up, reports feeling well. Reports intermittent breast tenderness and fatigue when she physically over worked. No nipple discharge or lumps/bumps. Has an appointment coming up for annual breast Mammo.

## 2024-05-24 NOTE — DISEASE MANAGEMENT
[Clinical] : TNM Stage: c [IA] : IA [FreeTextEntry4] : invasive ductal carcinoma [TTNM] : 1b [NTNM] : 0 [MTNM] : 0 [de-identified] : 5240cGy [de-identified] : right breast

## 2024-06-18 ENCOUNTER — APPOINTMENT (OUTPATIENT)
Dept: ORTHOPEDIC SURGERY | Facility: CLINIC | Age: 79
End: 2024-06-18
Payer: MEDICARE

## 2024-06-18 VITALS
WEIGHT: 161 LBS | HEIGHT: 62 IN | HEART RATE: 78 BPM | BODY MASS INDEX: 29.63 KG/M2 | SYSTOLIC BLOOD PRESSURE: 130 MMHG | DIASTOLIC BLOOD PRESSURE: 71 MMHG

## 2024-06-18 DIAGNOSIS — M17.11 UNILATERAL PRIMARY OSTEOARTHRITIS, RIGHT KNEE: ICD-10-CM

## 2024-06-18 DIAGNOSIS — M17.12 UNILATERAL PRIMARY OSTEOARTHRITIS, LEFT KNEE: ICD-10-CM

## 2024-06-18 PROCEDURE — 20610 DRAIN/INJ JOINT/BURSA W/O US: CPT | Mod: 50

## 2024-06-18 NOTE — PHYSICAL EXAM
Unscheduled/Repeat [de-identified] : GENERAL APPEARANCE: Well nourished and hydrated, pleasant, alert, and oriented x 3. Appears their stated age.  HEENT: Normocephalic, extraocular eye motion intact. Nasal septum midline. Oral cavity clear. External auditory canal clear.  RESPIRATORY: Breath sounds clear and audible in all lobes. No wheezing, No accessory muscle use. CARDIOVASCULAR: No apparent abnormalities. No lower leg edema. No varicosities. Pedal pulses are palpable. NEUROLOGIC: Sensation is normal, no muscle weakness in the upper or lower extremities. DERMATOLOGIC: No apparent skin lesions, moist, warm, no rash. SPINE: Cervical spine appears normal and moves freely; thoracic spine appears normal and moves freely; lumbosacral spine appears normal and moves freely, normal, nontender. MUSCULOSKELETAL: Hands, wrists, and elbows are normal and move freely, shoulders are normal and move freely.  Musculoskeletal:. Right knee exam shows medial joint line tenderness, no effusion, ROM 10-90 degrees.  Left knee exam shows medial joint line tenderness, no effusion.  5/5 motor strength in bilateral lower extremities. Sensory: Intact in bilateral lower extremities. DTRs: Biceps, brachioradialis, triceps, patellar, ankle and plantar 2+ and symmetric bilaterally. Pulses: dorsalis pedis, posterior tibial, femoral, popliteal, and radial 2+ and symmetric bilaterally.  Constitutional: Alert and in no acute distress, but well-appearing.

## 2024-06-18 NOTE — HISTORY OF PRESENT ILLNESS
[de-identified] : 80 y/o F presents with b/l knee pain. She has more pain in the right knee than in the left knee  she tried h.a injection but it did not help she c/o heaviness and lack of balance. PT for the knee alone is not helping. She has a known hx of bone on bone medial compartmental osteoarthritis of the right knee and near bone on bone medial compartmental osteoarthritis of the left knee. She received cortisone injection at her last appointment which helped for about 5 - 7 weeks. She has received PRP injections in the past from Dr. Stephan Chapa. She has pain with bending, walking, and standing. She was diagnosed with breast cancer and lung CA. she is note interested in surgical treatment of the knees.    STEPHEN SANCHEZHEIDIADALGISA presents with pain. She states the symptoms are worsening. Pain levels include a current pain level of 7/10, a minimum pain level of 2/10 and a maximum pain level of 8/10.    Modifying factors - worsened by bending, worsened by walking and worsened with knee flexion . stairs. Relieving factors include relieved by rest.    [Pain Location] : pain [Stable] : stable [5] : a current pain level of 5/10

## 2024-06-18 NOTE — DISCUSSION/SUMMARY
[Surgical risks reviewed] : Surgical risks reviewed [de-identified] : repeat injection  Medication risks reviewed. Surgical risks reviewed. 77 y/o F with bone on bone medial compartmental osteoarthritis of the right knee and near bone on bone medial compartmental osteoarthritis of the left knee.  The x-ray showed progression of the disease. The patient is a candidate for a TKA; She started to show interest doing right total knee arthroplasty for severe rated pain and limited activity She is currently undergoing active  treatment Of the breast cancer and lung cancer. .patient is having 5 to 7 weeks of pain relieved with cortisone injection she failed on  Euflexxa injection . she opted in for b/l knee cortisone injection today and she tolerated the procedure well   She has been using meloxicam given by primary care physician I educated her not to take with diclofenac. Patient will return to office in 3 months for repeat evaluation  The patient is a 78 year individual with end stage arthritis of their right knee joint. Based upon the patient's continued symptoms and failure to respond to conservative treatment I have recommended a right total knee arthroplasty for this patient. A long discussion took place with the patient describing what a total joint replacement is and what the procedure would entail. A total knee arthroplasty model, similar to the implant that was used during the operation, was utilized to demonstrate and to discuss the various bearing surfaces of the implants. The hospitalization and post-operative care and rehabilitation were also discussed. The use of perioperative antibiotics and DVT prophylaxis were discussed. The risk, benefits and alternatives to a surgical intervention were discussed at length with the patient. The patient was also advised of risks related to the medical comorbidities, elevated body mass index (BMI), and smoking where applicable. We discussed how to reduce modifiable risk factors and encouraged smoking cessation were applicable.. A lengthy discussion took place to review the most common complications including but not limited to: deep vein thrombosis, pulmonary embolus, heart attack, stroke, infection, wound breakdown, numbness, damage to nerves, tendon, muscles, arteries or other blood vessels, death and other possible complications from anesthesia. The patient was told that we will take steps to minimize these risks by using sterile technique, antibiotics and DVT prophylaxis when appropriate and follow the patient postoperatively in the office setting to monitor progress. The possibility of recurrent pain, no improvement in pain and actual worsening of pain were also discussed with the patient. The discharge plan of care focused on the patient going home following surgery. The patient was encouraged to make the necessary arrangements to have someone stay with them when they are discharged home. Following discharge, a home care nurse was to the patient. The home care nurse would open the patient's home care case and request home physical therapy services. Home physical therapy was to commence following discharge provided it was appropriate and covered by the health insurance benefit plan.  The benefits of surgery were discussed with the patient including the potential for improving her current clinical condition through operative intervention. Alternatives to surgical intervention including continued conservative management were also discussed in detail. All questions were answered to the satisfaction of the patient. The treatment plan of care, as well as a model of a total knee arthroplasty equivalent to the one that will be used for their total joint replacement, was shared with the patient. The patient agreed to the plan of care as well as the use of implants in their total joint replacement.

## 2024-06-24 ENCOUNTER — APPOINTMENT (OUTPATIENT)
Dept: SURGERY | Facility: CLINIC | Age: 79
End: 2024-06-24
Payer: MEDICARE

## 2024-06-24 VITALS
DIASTOLIC BLOOD PRESSURE: 80 MMHG | SYSTOLIC BLOOD PRESSURE: 154 MMHG | WEIGHT: 161.01 LBS | BODY MASS INDEX: 29.63 KG/M2 | HEIGHT: 62 IN | OXYGEN SATURATION: 97 % | HEART RATE: 75 BPM

## 2024-06-24 DIAGNOSIS — C50.911 MALIGNANT NEOPLASM OF UNSPECIFIED SITE OF RIGHT FEMALE BREAST: ICD-10-CM

## 2024-06-24 DIAGNOSIS — Z98.890 OTHER SPECIFIED POSTPROCEDURAL STATES: ICD-10-CM

## 2024-06-24 PROCEDURE — 99213 OFFICE O/P EST LOW 20 MIN: CPT

## 2024-08-01 ENCOUNTER — APPOINTMENT (OUTPATIENT)
Dept: PULMONOLOGY | Facility: CLINIC | Age: 79
End: 2024-08-01
Payer: MEDICARE

## 2024-08-01 VITALS
BODY MASS INDEX: 29.63 KG/M2 | HEIGHT: 62 IN | RESPIRATION RATE: 16 BRPM | SYSTOLIC BLOOD PRESSURE: 130 MMHG | HEART RATE: 78 BPM | OXYGEN SATURATION: 96 % | WEIGHT: 161 LBS | DIASTOLIC BLOOD PRESSURE: 76 MMHG

## 2024-08-01 DIAGNOSIS — E66.3 OVERWEIGHT: ICD-10-CM

## 2024-08-01 DIAGNOSIS — R91.8 OTHER NONSPECIFIC ABNORMAL FINDING OF LUNG FIELD: ICD-10-CM

## 2024-08-01 DIAGNOSIS — Z87.891 PERSONAL HISTORY OF NICOTINE DEPENDENCE: ICD-10-CM

## 2024-08-01 DIAGNOSIS — R05.9 COUGH, UNSPECIFIED: ICD-10-CM

## 2024-08-01 DIAGNOSIS — E66.9 OBESITY, UNSPECIFIED: ICD-10-CM

## 2024-08-01 DIAGNOSIS — R06.02 SHORTNESS OF BREATH: ICD-10-CM

## 2024-08-01 DIAGNOSIS — C34.90 MALIGNANT NEOPLASM OF UNSPECIFIED PART OF UNSPECIFIED BRONCHUS OR LUNG: ICD-10-CM

## 2024-08-01 PROCEDURE — 99214 OFFICE O/P EST MOD 30 MIN: CPT

## 2024-08-01 PROCEDURE — G2211 COMPLEX E/M VISIT ADD ON: CPT

## 2024-08-01 RX ORDER — ALBUTEROL SULFATE 90 UG/1
108 (90 BASE) INHALANT RESPIRATORY (INHALATION)
Qty: 1 | Refills: 2 | Status: ACTIVE | COMMUNITY
Start: 2024-08-01 | End: 1900-01-01

## 2024-08-01 NOTE — CONSULT LETTER
[Dear  ___] : Dear  [unfilled], [Consult Letter:] : I had the pleasure of evaluating your patient, [unfilled]. [Please see my note below.] : Please see my note below. [Consult Closing:] : Thank you very much for allowing me to participate in the care of this patient.  If you have any questions, please do not hesitate to contact me. [Sincerely,] : Sincerely, [FreeTextEntry3] : Vladimir Comer MD, FCCP, D. ABSM\par  Pulmonary and Sleep Medicine\par  [facility] at [place]

## 2024-08-01 NOTE — REASON FOR VISIT
[Follow-Up] : a follow-up visit [Lung Cancer] : lung cancer [Shortness of Breath] : shortness of breath [Abnormal CXR/ Chest CT] : an abnormal CXR/ chest CT [Pulmonary Nodules] : pulmonary nodules

## 2024-08-01 NOTE — DISCUSSION/SUMMARY
[FreeTextEntry1] : #1. PFTs performed previously were essentially normal though subsequent spirometry was reduced post ARIANA lobectomy for adenoCa and now remains stable; f/u PFTs intermittently. #2. The patient does not appear to require chronic BD therapy at this time as pt does not have obstruction on spirometry and restriction is likely due to lobectomy and XRT changes from breast cancer therapy as now seen on CT. Follow imaging studies as needed but will repeat for one year f/u. #3. SOBOE is likely related to weight or deconditioning given normal PFTs and limited ambulation due to knee pain; Albtuerol inhaler for wheeze as needed. #4. Diet and exercise for weight loss. #5. Oncology f/u for lung cancer and breast cancer. #6. ENT f/u for hoarseness. #7. Cardiology f/u. #8. S/p J&J Covid vaccine; refusing other vaccines. #9. Chest CT with small effusion and shift to right but improvement in aeration and no obvious parenchymal lung disease otherwise; repeat with stable changes and resolution of effusion. Follow chest imaging as needed. #10. F/u in 2 months with repeat PFTs and chest CT.  The patient expressed understanding and agreement with the above recommendations/plan and accepts responsibility to be compliant with recommended testing, therapies, and f/u visits. All relevant questions and concerns were addressed.

## 2024-08-01 NOTE — HISTORY OF PRESENT ILLNESS
[On ___] : performed on [unfilled] [Indication ___] : for an indication of [unfilled] [TextBox_4] : Patient with complaints of exercise intolerance post op since resection of lung cancer in 5/2021 and then dx'd with breast cancer in 3/2022 Some wheeze on awakening but dissipates with cough and Zyrtec. Chest wall tenderness since surgery. C/o hoarseness but follows with ENT.  Pt on Montelukast nightly for ? allergies. Pt reports Covid infection in 12/2020 with H/a, fevers, and loss of taste. She did not require therapy and symptoms resolved. Pt seen by oncology but no further therapy appears to be required for her lung cancer and breast cancer. ? murmur - follows with Dr. Holt. For XRT per Rad-onc for breast cancer.  recently passed 12/2023. No new respiratory complaints. [FreeTextEntry9] : Chest CT [FreeTextEntry8] : s/p ARIANA lobectomy [TextEntry] : Chest CT from 5/17/22 revealed essentially stable changes including stable L effusion and stable nodules. Chest CT from 11/15/22 revealed essentially stable changes and ? L effusion vs pleural thickening. Chest CT from 8/15/23 revealed stable likely XRT changes c/w priors with small stable nodules per report.

## 2024-08-14 NOTE — ASU PATIENT PROFILE, ADULT - PATIENT'S PREFERRED PRONOUN
REBECCA has not received a return call from , Madison Dockery, and made additional attempt to reach . REBECCA was able to reach Madison and introduce self and role. Per Madison she sees pt at least once a week, she also confirmed pt is keeping all of her OP MH appts and the therapist is aware of pt current concerns. Madison also shared pts housing concerns are being resolved as pt landlord was trying to increase pt rent. Madison will continue to support pt as her ongoing  and Madison will contact myself if any additional support is needed for pt. REBECCA will remain available.   
Her/She

## 2024-08-26 DIAGNOSIS — M79.643 PAIN IN UNSPECIFIED HAND: ICD-10-CM

## 2024-08-26 DIAGNOSIS — M79.646 PAIN IN UNSPECIFIED HAND: ICD-10-CM

## 2024-08-27 ENCOUNTER — APPOINTMENT (OUTPATIENT)
Dept: ORTHOPEDIC SURGERY | Facility: CLINIC | Age: 79
End: 2024-08-27

## 2024-08-27 VITALS
BODY MASS INDEX: 28.53 KG/M2 | TEMPERATURE: 98.1 F | DIASTOLIC BLOOD PRESSURE: 84 MMHG | SYSTOLIC BLOOD PRESSURE: 148 MMHG | HEIGHT: 63 IN | WEIGHT: 161 LBS | HEART RATE: 77 BPM

## 2024-08-27 DIAGNOSIS — M79.641 PAIN IN RIGHT HAND: ICD-10-CM

## 2024-08-27 DIAGNOSIS — G56.00 CARPAL TUNNEL SYNDROME, UNSPECIFIED UPPER LIMB: ICD-10-CM

## 2024-08-27 DIAGNOSIS — M25.532 PAIN IN RIGHT WRIST: ICD-10-CM

## 2024-08-27 DIAGNOSIS — M25.531 PAIN IN RIGHT WRIST: ICD-10-CM

## 2024-08-27 DIAGNOSIS — M65.30 TRIGGER FINGER, UNSPECIFIED FINGER: ICD-10-CM

## 2024-08-27 PROCEDURE — 20526 THER INJECTION CARP TUNNEL: CPT | Mod: 50,59

## 2024-08-27 PROCEDURE — 99215 OFFICE O/P EST HI 40 MIN: CPT | Mod: 25

## 2024-08-27 PROCEDURE — 20550 NJX 1 TENDON SHEATH/LIGAMENT: CPT | Mod: RT

## 2024-08-27 PROCEDURE — 73130 X-RAY EXAM OF HAND: CPT | Mod: 50

## 2024-08-27 NOTE — PHYSICAL EXAM
[de-identified] : Examination of the [bilateral] wrist reveals discomfort with compression at the level of the volar carpal tunnel eliciting numbness/tingling throughout the fingertips Examination of the hand(s)  particularly at the A1 of the right index reveals tenderness with a palpable click. [de-identified] : [4] views of [bilateral hands and wrists] were obtained today in my office and were seen by me and discussed with the patient.  These [show findings consistent with bilateral basal joint OA and findings of IP joint OA]

## 2024-08-27 NOTE — HISTORY OF PRESENT ILLNESS
[FreeTextEntry1] : Gracie is a very pleasant 79-year-old female who presents today with a history of bilateral wrist and hand discomfort describes numbness and tingling.  Describes stiffness and triggering of digits.  These are inhibiting ADLs

## 2024-08-27 NOTE — ASSESSMENT
[FreeTextEntry1] : ASSESSMENT: The patient comes in today with chronic recurring worsening symptoms of bilateral wrist and hand discomfort we have discussed carpal tunnel symptoms as well as tendinopathy.  With this in mind we have discussed treatment options and she elects for injections   The patient was adequately and thoroughly informed of my assessment of their current condition(s).  - This may diminish bodily function for the extremity. We discussed prognosis, tx modalities including operative and nonoperative options for the above diagnostic assessment. As always, 2nd opinion is always provided as an option.  When accessible, I was able to review other physicians note(s) including reviewing other tests, imaging results as well as personally view these results for my own interpretation.   Injection:   The risks and benefits of a steroid injection were discussed in detail. The risks include but are not limited to: pain, infection, swelling, flare response, bleeding, subcutaneous fat atrophy, skin depigmentation and/or elevation of blood sugar. The risk of incomplete resolution of symptoms, recurrence and additional intervention was reviewed and considered by the patient. The patient agreed to proceed and under a sterile prep, I injected 1 unit 6mg into 1 cc of a combination of Celestone and Lidocaine into the bilateral carpal tunnel, right index A1. The patient tolerated the injection well.  The patient was adequately and thoroughly informed of my assessment of their current condition(s).  DISCUSSION: 1.  Injections as above.  Activity modification follow-up 6 weeks 2. [x] 3. [x]

## 2024-08-29 ENCOUNTER — OUTPATIENT (OUTPATIENT)
Dept: OUTPATIENT SERVICES | Facility: HOSPITAL | Age: 79
LOS: 1 days | Discharge: HOME | End: 2024-08-29

## 2024-08-29 DIAGNOSIS — Z98.890 OTHER SPECIFIED POSTPROCEDURAL STATES: Chronic | ICD-10-CM

## 2024-08-29 DIAGNOSIS — C34.90 MALIGNANT NEOPLASM OF UNSPECIFIED PART OF UNSPECIFIED BRONCHUS OR LUNG: ICD-10-CM

## 2024-08-29 DIAGNOSIS — Z90.2 ACQUIRED ABSENCE OF LUNG [PART OF]: Chronic | ICD-10-CM

## 2024-09-10 ENCOUNTER — RESULT REVIEW (OUTPATIENT)
Age: 79
End: 2024-09-10

## 2024-09-10 ENCOUNTER — APPOINTMENT (OUTPATIENT)
Dept: HEMATOLOGY ONCOLOGY | Facility: CLINIC | Age: 79
End: 2024-09-10
Payer: MEDICARE

## 2024-09-10 VITALS
OXYGEN SATURATION: 99 % | HEIGHT: 62 IN | WEIGHT: 166.89 LBS | TEMPERATURE: 97.8 F | BODY MASS INDEX: 30.71 KG/M2 | HEART RATE: 84 BPM | DIASTOLIC BLOOD PRESSURE: 83 MMHG | SYSTOLIC BLOOD PRESSURE: 169 MMHG

## 2024-09-10 DIAGNOSIS — M85.851 OTHER SPECIFIED DISORDERS OF BONE DENSITY AND STRUCTURE, RIGHT THIGH: ICD-10-CM

## 2024-09-10 DIAGNOSIS — C50.911 MALIGNANT NEOPLASM OF UNSPECIFIED SITE OF RIGHT FEMALE BREAST: ICD-10-CM

## 2024-09-10 DIAGNOSIS — Z79.811 LONG TERM (CURRENT) USE OF AROMATASE INHIBITORS: ICD-10-CM

## 2024-09-10 DIAGNOSIS — M85.852 OTHER SPECIFIED DISORDERS OF BONE DENSITY AND STRUCTURE, RIGHT THIGH: ICD-10-CM

## 2024-09-10 LAB
BASOPHILS # BLD AUTO: 0.1 K/UL — SIGNIFICANT CHANGE UP (ref 0–0.2)
BASOPHILS NFR BLD AUTO: 1 % — SIGNIFICANT CHANGE UP (ref 0–2)
EOSINOPHIL # BLD AUTO: 0.1 K/UL — SIGNIFICANT CHANGE UP (ref 0–0.5)
EOSINOPHIL NFR BLD AUTO: 1.6 % — SIGNIFICANT CHANGE UP (ref 0–6)
HCT VFR BLD CALC: 37.8 % — SIGNIFICANT CHANGE UP (ref 34.5–45)
HGB BLD-MCNC: 13.1 G/DL — SIGNIFICANT CHANGE UP (ref 11.5–15.5)
LYMPHOCYTES # BLD AUTO: 1 K/UL — SIGNIFICANT CHANGE UP (ref 1–3.3)
LYMPHOCYTES # BLD AUTO: 17.9 % — SIGNIFICANT CHANGE UP (ref 13–44)
MCHC RBC-ENTMCNC: 32.7 PG — SIGNIFICANT CHANGE UP (ref 27–34)
MCHC RBC-ENTMCNC: 34.6 G/DL — SIGNIFICANT CHANGE UP (ref 32–36)
MCV RBC AUTO: 94.7 FL — SIGNIFICANT CHANGE UP (ref 80–100)
MONOCYTES # BLD AUTO: 0.4 K/UL — SIGNIFICANT CHANGE UP (ref 0–0.9)
MONOCYTES NFR BLD AUTO: 7.1 % — SIGNIFICANT CHANGE UP (ref 2–14)
NEUTROPHILS # BLD AUTO: 4.1 K/UL — SIGNIFICANT CHANGE UP (ref 1.8–7.4)
NEUTROPHILS NFR BLD AUTO: 72.4 % — SIGNIFICANT CHANGE UP (ref 43–77)
PLATELET # BLD AUTO: 171 K/UL — SIGNIFICANT CHANGE UP (ref 150–400)
RBC # BLD: 4 M/UL — SIGNIFICANT CHANGE UP (ref 3.8–5.2)
RBC # FLD: 11.4 % — SIGNIFICANT CHANGE UP (ref 10.3–14.5)
WBC # BLD: 5.7 K/UL — SIGNIFICANT CHANGE UP (ref 3.8–10.5)
WBC # FLD AUTO: 5.7 K/UL — SIGNIFICANT CHANGE UP (ref 3.8–10.5)

## 2024-09-10 PROCEDURE — 99214 OFFICE O/P EST MOD 30 MIN: CPT

## 2024-09-10 NOTE — RESULTS/DATA
[FreeTextEntry1] : 3/30/22 Pathology: R Breast Infiltrating ductal carcinoma (IDC), Grade 1 [place] Score (1,2,1). Ductal carcinoma in situ (DCIS), cribriform type, intermediate nuclear\par  grade. Tumor size (IDC + DCIS) :  9 mm. IDC and DCIS are present less than 1 mm from the anterior margin of\par  resection. Additional lateral and anterior margins were negative.  DCIS is focally present less than 1 mm from the lateral margin. Biopsy site changes. 0/2 lymph nodes with clear margins. pT1bN0

## 2024-09-10 NOTE — HISTORY OF PRESENT ILLNESS
[de-identified] : Ms. White is a 76 year old female with newly diagnosed right breast IDC grade 1 ER % IA 41-50% Her2 negative at age 76.  Patient underwent screening mammogram on 11/29/21 demonstrating right focal asymmetry with queried architectural distortion is indeterminate and left upper asymmetry is indeterminate. Obtained diagnostic mammogram and sonogram on 12/27/21 demonstrating a 5mm hypoechoic mass with associated mild distortion in the right breast at 11:00, 2cm from the nipple with recommendation for biopsy; additional findings of probably benign asymmetry in the upper left breast, far posteriorly, most likely related to postsurgical changes from prior left lung surgery with recommendation for 6 month follow up diagnostic mammogram   1/11/2022 Underwent right breast 11:00, core biopsy- IDC, well differentiated, Kermit score 5/9 (2+ 2 + 1), measuring at least 3 mm, DCIS with low  nuclear grade with microcalcification, lymphovascular permeation by tumor not seen. ER: % IA: 41-50% Her-2:  Negative (1+) Ki67 10%  02/08/2022 LUMOback Genetic testing- negative no clinically significant mutation identified. Variant of uncertain significance- BRIP1  02/09/2022 MR Breast- Biopsy-proven right breast malignancy (7 mm). No evidence of multifocal or multicentric disease.   Planning for right breast lumpectomy with Dr. Pickett  Recalls recent DEXA from 2021 was normal Has only received Hydroxychloroquine for rheumatoid arthritis. Has intermittent joint pain/stiffness currently  Following up with pulmonologist Dr. Comer for imaging related to history of lung adenocarcinoma   PMH: Left lower lobe adenocarcinoma s/p lobectomy on 5/4/21,Hypertension, Hyperlipidemia, Rheumatoid Arthritis PSH: Left lobectomy on 5/4/2, lab sigmoidectomy 2/2 colonic perforation after colonoscopy, h/o diverticulitis.  SH: History of smoking, quit 30 years ago  FH: Denies family history of cancer Rheumatologist- Dr. Davis  Pulmonologist- Dr. Comer [de-identified] : Patient presents for follow-up visit today Continues on Anastrozole, tolerating well  Continues on Vit K2 (per PCP) Reports preexisting arthritic pain on knees, not changed from baseline Reports difficulty in falling sleep Reports eating well, increase in appetite Denies hot flashes, vaginal dryness, mood changes Denies headache, dizziness, palpitation, sob, bone pain, chest pain, abdominal pain, nausea, vomiting, change in bowel or bladder pattern. Denies recent illness or hospitalization

## 2024-09-10 NOTE — ASSESSMENT
[FreeTextEntry1] : 78 year old female with stage IA (pT1bN0) right breast IDC grade 1, 9 mm, ER % ID 41-50% Her2 negative. Oncotype Dx RS is 14 with <1% benefit from chemo and risk of distant recurrence with Sinclair or AI is 4%. Completed adjuvant breast RT on 6/28/22.  Began Anastrazole on 7/16/22 after completing RT 3/7/23 Dexa: b/l femur neck osteopenia 8/21/23 Mammo Right - Post treatment changes. No mammographic evidence of malignancy 5/29/2024 bilateral diagnostic mammo: MICHAEL BI-RADS 2 6/13/24 enedina. breast sono-MICHAEL, right breast 12:00 post surgical scarring  BI-RAD2 CBE-MICHAEL Tolerating anastrozole well overall.   Plan: -Continue Anastrazole daily -Continue follow up with Breast surgery Dr. Mendez, mammo/sono due in 5/25 -Osteopenia - Stopped Vitamin D3 per PCP and on Vit K2 for high calcium? repeat DEXA 3/2025 -Lab today-CB, CMP, Vitamin D level  -continue age- appropriate preventive care, f/u with PCP, f/u with pulmonology-pending CT Chest 9/24 for pulmonary nodule  RTO in 6 months

## 2024-09-10 NOTE — PHYSICAL EXAM
[Normal] : no peripheral adenopathy appreciated [de-identified] : No bilateral palpable breast mass. no palpable axillary adenopathy bilaterally

## 2024-09-11 LAB
25(OH)D3 SERPL-MCNC: 37.6 NG/ML
ALBUMIN SERPL ELPH-MCNC: 4.4 G/DL
ALP BLD-CCNC: 124 U/L
ALT SERPL-CCNC: 18 U/L
ANION GAP SERPL CALC-SCNC: 11 MMOL/L
AST SERPL-CCNC: 20 U/L
BILIRUB SERPL-MCNC: 0.4 MG/DL
BUN SERPL-MCNC: 18 MG/DL
CALCIUM SERPL-MCNC: 10.6 MG/DL
CHLORIDE SERPL-SCNC: 102 MMOL/L
CO2 SERPL-SCNC: 27 MMOL/L
CREAT SERPL-MCNC: 0.62 MG/DL
EGFR: 92 ML/MIN/1.73M2
GLUCOSE SERPL-MCNC: 110 MG/DL
POTASSIUM SERPL-SCNC: 4.9 MMOL/L
PROT SERPL-MCNC: 6.6 G/DL
SODIUM SERPL-SCNC: 140 MMOL/L

## 2024-09-17 ENCOUNTER — APPOINTMENT (OUTPATIENT)
Dept: ORTHOPEDIC SURGERY | Facility: CLINIC | Age: 79
End: 2024-09-17
Payer: MEDICARE

## 2024-09-17 VITALS
HEIGHT: 62 IN | WEIGHT: 166 LBS | DIASTOLIC BLOOD PRESSURE: 74 MMHG | BODY MASS INDEX: 30.55 KG/M2 | HEART RATE: 84 BPM | SYSTOLIC BLOOD PRESSURE: 132 MMHG

## 2024-09-17 DIAGNOSIS — M17.0 BILATERAL PRIMARY OSTEOARTHRITIS OF KNEE: ICD-10-CM

## 2024-09-17 PROCEDURE — 20610 DRAIN/INJ JOINT/BURSA W/O US: CPT | Mod: 50

## 2024-09-17 NOTE — HISTORY OF PRESENT ILLNESS
[de-identified] : 78 y/o F presents with b/l knee pain. She has more pain in the right knee than in the left knee  she tried h.a injection but it did not help she c/o heaviness and lack of balance. PT for the knee alone is not helping. She has a known hx of bone on bone medial compartmental osteoarthritis of the right knee and near bone on bone medial compartmental osteoarthritis of the left knee. She received cortisone injection at her last appointment which helped for about 5 - 7 weeks. She has received PRP injections in the past from Dr. Stephan Chapa. She has pain with bending, walking, and standing. She was diagnosed with breast cancer and lung CA. she is note interested in surgical treatment of the knees.    STEPHEN SANCHEZHEIDIADALGISA presents with pain. She states the symptoms are worsening. Pain levels include a current pain level of 7/10, a minimum pain level of 2/10 and a maximum pain level of 8/10.    Modifying factors - worsened by bending, worsened by walking and worsened with knee flexion . stairs. Relieving factors include relieved by rest.

## 2024-09-17 NOTE — DISCUSSION/SUMMARY
[Surgical risks reviewed] : Surgical risks reviewed [de-identified] : repeat injection  Medication risks reviewed. Surgical risks reviewed. 77 y/o F with bone on bone medial compartmental osteoarthritis of the right knee and near bone on bone medial compartmental osteoarthritis of the left knee.  The x-ray showed progression of the disease. The patient is a candidate for a TKA; She started to show interest doing right total knee arthroplasty for severe rated pain and limited activity She is currently undergoing active  treatment Of the breast cancer and lung cancer. .patient is having 5 to 7 weeks of pain relieved with cortisone injection she failed on  Euflexxa injection . she opted in for b/l knee cortisone injection today and she tolerated the procedure well   . Patient will return to office in 3 months for repeat evaluation  The patient is a 78 year individual with end stage arthritis of their right knee joint. Based upon the patient's continued symptoms and failure to respond to conservative treatment I have recommended a right total knee arthroplasty for this patient. A long discussion took place with the patient describing what a total joint replacement is and what the procedure would entail. A total knee arthroplasty model, similar to the implant that was used during the operation, was utilized to demonstrate and to discuss the various bearing surfaces of the implants. The hospitalization and post-operative care and rehabilitation were also discussed. The use of perioperative antibiotics and DVT prophylaxis were discussed. The risk, benefits and alternatives to a surgical intervention were discussed at length with the patient. The patient was also advised of risks related to the medical comorbidities, elevated body mass index (BMI), and smoking where applicable. We discussed how to reduce modifiable risk factors and encouraged smoking cessation were applicable.. A lengthy discussion took place to review the most common complications including but not limited to: deep vein thrombosis, pulmonary embolus, heart attack, stroke, infection, wound breakdown, numbness, damage to nerves, tendon, muscles, arteries or other blood vessels, death and other possible complications from anesthesia. The patient was told that we will take steps to minimize these risks by using sterile technique, antibiotics and DVT prophylaxis when appropriate and follow the patient postoperatively in the office setting to monitor progress. The possibility of recurrent pain, no improvement in pain and actual worsening of pain were also discussed with the patient. The discharge plan of care focused on the patient going home following surgery. The patient was encouraged to make the necessary arrangements to have someone stay with them when they are discharged home. Following discharge, a home care nurse was to the patient. The home care nurse would open the patient's home care case and request home physical therapy services. Home physical therapy was to commence following discharge provided it was appropriate and covered by the health insurance benefit plan.  The benefits of surgery were discussed with the patient including the potential for improving her current clinical condition through operative intervention. Alternatives to surgical intervention including continued conservative management were also discussed in detail. All questions were answered to the satisfaction of the patient. The treatment plan of care, as well as a model of a total knee arthroplasty equivalent to the one that will be used for their total joint replacement, was shared with the patient. The patient agreed to the plan of care as well as the use of implants in their total joint replacement.

## 2024-09-17 NOTE — PROCEDURE
[de-identified] : Patient received b/l  knee 80mg cortisone injection for osteoarthritis  I discussed at length with the patient the planned steroid and lidocaine injection. The risks, benefits, convalescence and alternatives were reviewed. The possible side effects discussed included but were not limited to: pain, swelling, heat, bleeding, and redness. Symptoms are generally mild but if they are extensive then contact the office. Giving pain relievers by mouth such as NSAIDs or Tylenol can generally treat the reactions to steroid and lidocaine. Rare cases of infection have been noted. Rash, hives and itching may occur post injection. If you have muscle pain or cramps, flushing and or swelling of the face, rapid heart beat, nausea, dizziness, fever, chills, headache, difficulty breathing, swelling in the arms or legs, or have a prickly feeling of your skin, contact a health care provider immediately. Following this discussion, the knee was prepped with Alcohol and under sterile condition the 80 mg Depo-Medrol and 6 cc Lidocaine injection was performed with a 20 gauge needle through a superolateral injection site. The needle was introduced into the joint, aspiration was performed to ensure intra-articular placement and the medication was injected. Upon withdrawal of the needle the site was cleaned with alcohol and a band aid applied. The patient tolerated the injection well and there were no adverse effects. Post injection instructions included no strenuous activity for 24 hours, cryotherapy and if there are any adverse effects to contact the office.

## 2024-09-17 NOTE — PHYSICAL EXAM
[de-identified] : GENERAL APPEARANCE: Well nourished and hydrated, pleasant, alert, and oriented x 3. Appears their stated age.  HEENT: Normocephalic, extraocular eye motion intact. Nasal septum midline. Oral cavity clear. External auditory canal clear.  RESPIRATORY: Breath sounds clear and audible in all lobes. No wheezing, No accessory muscle use. CARDIOVASCULAR: No apparent abnormalities. No lower leg edema. No varicosities. Pedal pulses are palpable. NEUROLOGIC: Sensation is normal, no muscle weakness in the upper or lower extremities. DERMATOLOGIC: No apparent skin lesions, moist, warm, no rash. SPINE: Cervical spine appears normal and moves freely; thoracic spine appears normal and moves freely; lumbosacral spine appears normal and moves freely, normal, nontender. MUSCULOSKELETAL: Hands, wrists, and elbows are normal and move freely, shoulders are normal and move freely.  Musculoskeletal:. Right knee exam shows medial joint line tenderness, mild effusion, ROM 10-90 degrees.  Left knee exam shows medial joint line tenderness, no effusion. ROM is 5-100 degree 5/5 motor strength in bilateral lower extremities. Sensory: Intact in bilateral lower extremities. DTRs: Biceps, brachioradialis, triceps, patellar, ankle and plantar 2+ and symmetric bilaterally. Pulses: dorsalis pedis, posterior tibial, femoral, popliteal, and radial 2+ and symmetric bilaterally.  Constitutional: Alert and in no acute distress, but well-appearing.

## 2024-10-10 ENCOUNTER — APPOINTMENT (OUTPATIENT)
Dept: PULMONOLOGY | Facility: CLINIC | Age: 79
End: 2024-10-10
Payer: MEDICARE

## 2024-10-10 ENCOUNTER — APPOINTMENT (OUTPATIENT)
Dept: ORTHOPEDIC SURGERY | Facility: CLINIC | Age: 79
End: 2024-10-10

## 2024-10-10 VITALS
DIASTOLIC BLOOD PRESSURE: 64 MMHG | SYSTOLIC BLOOD PRESSURE: 120 MMHG | HEART RATE: 78 BPM | RESPIRATION RATE: 16 BRPM | OXYGEN SATURATION: 98 %

## 2024-10-10 VITALS — HEIGHT: 62.2 IN | WEIGHT: 164 LBS | BODY MASS INDEX: 29.8 KG/M2

## 2024-10-10 DIAGNOSIS — E66.9 OBESITY, UNSPECIFIED: ICD-10-CM

## 2024-10-10 DIAGNOSIS — C34.90 MALIGNANT NEOPLASM OF UNSPECIFIED PART OF UNSPECIFIED BRONCHUS OR LUNG: ICD-10-CM

## 2024-10-10 DIAGNOSIS — R06.02 SHORTNESS OF BREATH: ICD-10-CM

## 2024-10-10 DIAGNOSIS — R05.9 COUGH, UNSPECIFIED: ICD-10-CM

## 2024-10-10 DIAGNOSIS — R91.8 OTHER NONSPECIFIC ABNORMAL FINDING OF LUNG FIELD: ICD-10-CM

## 2024-10-10 DIAGNOSIS — Z87.891 PERSONAL HISTORY OF NICOTINE DEPENDENCE: ICD-10-CM

## 2024-10-10 PROCEDURE — 99214 OFFICE O/P EST MOD 30 MIN: CPT | Mod: 25

## 2024-10-10 PROCEDURE — 94727 GAS DIL/WSHOT DETER LNG VOL: CPT

## 2024-10-10 PROCEDURE — 94729 DIFFUSING CAPACITY: CPT

## 2024-10-10 PROCEDURE — 94010 BREATHING CAPACITY TEST: CPT

## 2024-10-10 PROCEDURE — 85018 HEMOGLOBIN: CPT | Mod: QW

## 2024-11-22 ENCOUNTER — APPOINTMENT (OUTPATIENT)
Dept: RADIATION ONCOLOGY | Facility: CLINIC | Age: 79
End: 2024-11-22
Payer: MEDICARE

## 2024-11-22 VITALS
SYSTOLIC BLOOD PRESSURE: 151 MMHG | HEART RATE: 83 BPM | RESPIRATION RATE: 16 BRPM | DIASTOLIC BLOOD PRESSURE: 71 MMHG | BODY MASS INDEX: 30.73 KG/M2 | WEIGHT: 168 LBS

## 2024-11-22 DIAGNOSIS — Z92.3 PERSONAL HISTORY OF IRRADIATION: ICD-10-CM

## 2024-11-22 DIAGNOSIS — Z98.890 OTHER SPECIFIED POSTPROCEDURAL STATES: ICD-10-CM

## 2024-11-22 DIAGNOSIS — Z79.811 LONG TERM (CURRENT) USE OF AROMATASE INHIBITORS: ICD-10-CM

## 2024-11-22 DIAGNOSIS — C50.911 MALIGNANT NEOPLASM OF UNSPECIFIED SITE OF RIGHT FEMALE BREAST: ICD-10-CM

## 2024-11-22 PROCEDURE — 99212 OFFICE O/P EST SF 10 MIN: CPT

## 2024-11-22 PROCEDURE — G2211 COMPLEX E/M VISIT ADD ON: CPT

## 2024-12-17 ENCOUNTER — APPOINTMENT (OUTPATIENT)
Dept: ORTHOPEDIC SURGERY | Facility: CLINIC | Age: 79
End: 2024-12-17
Payer: MEDICARE

## 2024-12-17 VITALS
HEIGHT: 62 IN | WEIGHT: 168 LBS | DIASTOLIC BLOOD PRESSURE: 78 MMHG | BODY MASS INDEX: 30.91 KG/M2 | SYSTOLIC BLOOD PRESSURE: 124 MMHG

## 2024-12-17 DIAGNOSIS — M17.0 BILATERAL PRIMARY OSTEOARTHRITIS OF KNEE: ICD-10-CM

## 2024-12-17 PROCEDURE — 20610 DRAIN/INJ JOINT/BURSA W/O US: CPT | Mod: 50

## 2024-12-17 PROCEDURE — 99213 OFFICE O/P EST LOW 20 MIN: CPT | Mod: 25

## 2024-12-23 ENCOUNTER — APPOINTMENT (OUTPATIENT)
Dept: SURGERY | Facility: CLINIC | Age: 79
End: 2024-12-23

## 2024-12-29 PROBLEM — Z08 ENCOUNTER FOR FOLLOW-UP SURVEILLANCE OF BREAST CANCER: Status: ACTIVE | Noted: 2024-12-29

## 2024-12-30 ENCOUNTER — APPOINTMENT (OUTPATIENT)
Dept: BREAST CENTER | Facility: CLINIC | Age: 79
End: 2024-12-30
Payer: MEDICARE

## 2024-12-30 VITALS
HEIGHT: 63 IN | BODY MASS INDEX: 28.7 KG/M2 | DIASTOLIC BLOOD PRESSURE: 82 MMHG | SYSTOLIC BLOOD PRESSURE: 132 MMHG | WEIGHT: 162 LBS | OXYGEN SATURATION: 98 % | HEART RATE: 78 BPM | TEMPERATURE: 98 F

## 2024-12-30 DIAGNOSIS — Z85.3 ENCOUNTER FOR FOLLOW-UP EXAMINATION AFTER COMPLETED TREATMENT FOR MALIGNANT NEOPLASM: ICD-10-CM

## 2024-12-30 DIAGNOSIS — Z08 ENCOUNTER FOR FOLLOW-UP EXAMINATION AFTER COMPLETED TREATMENT FOR MALIGNANT NEOPLASM: ICD-10-CM

## 2024-12-30 DIAGNOSIS — C50.911 MALIGNANT NEOPLASM OF UNSPECIFIED SITE OF RIGHT FEMALE BREAST: ICD-10-CM

## 2024-12-30 DIAGNOSIS — Z09 ENCOUNTER FOR FOLLOW-UP EXAMINATION AFTER COMPLETED TREATMENT FOR CONDITIONS OTHER THAN MALIGNANT NEOPLASM: ICD-10-CM

## 2024-12-30 PROCEDURE — 99204 OFFICE O/P NEW MOD 45 MIN: CPT

## 2024-12-30 PROCEDURE — 99214 OFFICE O/P EST MOD 30 MIN: CPT

## 2025-01-27 NOTE — PHYSICAL THERAPY INITIAL EVALUATION ADULT - LEVEL OF INDEPENDENCE: GAIT, REHAB EVAL
independent GOAL: Pt will perform ALL transfers (I), w/use of appropriate assistive device as needed, in 4 weeks.

## 2025-01-30 ENCOUNTER — APPOINTMENT (OUTPATIENT)
Dept: ORTHOPEDIC SURGERY | Facility: CLINIC | Age: 80
End: 2025-01-30
Payer: MEDICARE

## 2025-01-30 VITALS
SYSTOLIC BLOOD PRESSURE: 129 MMHG | HEIGHT: 63 IN | DIASTOLIC BLOOD PRESSURE: 83 MMHG | WEIGHT: 162 LBS | BODY MASS INDEX: 28.7 KG/M2

## 2025-01-30 DIAGNOSIS — M25.531 PAIN IN RIGHT WRIST: ICD-10-CM

## 2025-01-30 DIAGNOSIS — G56.03 CARPAL TUNNEL SYNDROM,BILATERAL UPPER LIMBS: ICD-10-CM

## 2025-01-30 DIAGNOSIS — M25.532 PAIN IN RIGHT WRIST: ICD-10-CM

## 2025-01-30 DIAGNOSIS — M65.321 TRIGGER FINGER, RIGHT INDEX FINGER: ICD-10-CM

## 2025-01-30 PROCEDURE — 99214 OFFICE O/P EST MOD 30 MIN: CPT | Mod: 25

## 2025-01-30 PROCEDURE — 20550 NJX 1 TENDON SHEATH/LIGAMENT: CPT | Mod: RT

## 2025-01-30 PROCEDURE — 20526 THER INJECTION CARP TUNNEL: CPT | Mod: 50,59

## 2025-03-03 ENCOUNTER — OUTPATIENT (OUTPATIENT)
Dept: OUTPATIENT SERVICES | Facility: HOSPITAL | Age: 80
LOS: 1 days | Discharge: HOME | End: 2025-03-03

## 2025-03-03 DIAGNOSIS — Z90.2 ACQUIRED ABSENCE OF LUNG [PART OF]: Chronic | ICD-10-CM

## 2025-03-03 DIAGNOSIS — Z98.890 OTHER SPECIFIED POSTPROCEDURAL STATES: Chronic | ICD-10-CM

## 2025-03-03 DIAGNOSIS — C34.90 MALIGNANT NEOPLASM OF UNSPECIFIED PART OF UNSPECIFIED BRONCHUS OR LUNG: ICD-10-CM

## 2025-03-07 NOTE — PROCEDURE
Home [de-identified] : Patient received b/l  knee 80mg cortisone injection for osteoarthritis  I discussed at length with the patient the planned steroid and lidocaine injection. The risks, benefits, convalescence and alternatives were reviewed. The possible side effects discussed included but were not limited to: pain, swelling, heat, bleeding, and redness. Symptoms are generally mild but if they are extensive then contact the office. Giving pain relievers by mouth such as NSAIDs or Tylenol can generally treat the reactions to steroid and lidocaine. Rare cases of infection have been noted. Rash, hives and itching may occur post injection. If you have muscle pain or cramps, flushing and or swelling of the face, rapid heart beat, nausea, dizziness, fever, chills, headache, difficulty breathing, swelling in the arms or legs, or have a prickly feeling of your skin, contact a health care provider immediately. Following this discussion, the knee was prepped with Alcohol and under sterile condition the 80 mg Depo-Medrol and 6 cc Lidocaine injection was performed with a 20 gauge needle through a superolateral injection site. The needle was introduced into the joint, aspiration was performed to ensure intra-articular placement and the medication was injected. Upon withdrawal of the needle the site was cleaned with alcohol and a band aid applied. The patient tolerated the injection well and there were no adverse effects. Post injection instructions included no strenuous activity for 24 hours, cryotherapy and if there are any adverse effects to contact the office.

## 2025-03-11 ENCOUNTER — APPOINTMENT (OUTPATIENT)
Dept: HEMATOLOGY ONCOLOGY | Facility: CLINIC | Age: 80
End: 2025-03-11
Payer: MEDICARE

## 2025-03-11 ENCOUNTER — NON-APPOINTMENT (OUTPATIENT)
Age: 80
End: 2025-03-11

## 2025-03-11 ENCOUNTER — APPOINTMENT (OUTPATIENT)
Dept: ORTHOPEDIC SURGERY | Facility: CLINIC | Age: 80
End: 2025-03-11
Payer: MEDICARE

## 2025-03-11 VITALS
HEIGHT: 63 IN | SYSTOLIC BLOOD PRESSURE: 178 MMHG | WEIGHT: 163.36 LBS | HEART RATE: 88 BPM | BODY MASS INDEX: 28.95 KG/M2 | TEMPERATURE: 97.7 F | OXYGEN SATURATION: 96 % | DIASTOLIC BLOOD PRESSURE: 77 MMHG

## 2025-03-11 VITALS — BODY MASS INDEX: 28.88 KG/M2 | WEIGHT: 163 LBS | HEIGHT: 63 IN

## 2025-03-11 DIAGNOSIS — Z79.811 LONG TERM (CURRENT) USE OF AROMATASE INHIBITORS: ICD-10-CM

## 2025-03-11 DIAGNOSIS — M24.50 CONTRACTURE, UNSPECIFIED JOINT: ICD-10-CM

## 2025-03-11 DIAGNOSIS — C50.911 MALIGNANT NEOPLASM OF UNSPECIFIED SITE OF RIGHT FEMALE BREAST: ICD-10-CM

## 2025-03-11 DIAGNOSIS — M17.0 BILATERAL PRIMARY OSTEOARTHRITIS OF KNEE: ICD-10-CM

## 2025-03-11 PROCEDURE — G2211 COMPLEX E/M VISIT ADD ON: CPT

## 2025-03-11 PROCEDURE — 99214 OFFICE O/P EST MOD 30 MIN: CPT

## 2025-03-11 PROCEDURE — 20610 DRAIN/INJ JOINT/BURSA W/O US: CPT | Mod: 50

## 2025-03-11 PROCEDURE — 99213 OFFICE O/P EST LOW 20 MIN: CPT | Mod: 25

## 2025-04-01 ENCOUNTER — APPOINTMENT (OUTPATIENT)
Dept: PULMONOLOGY | Facility: CLINIC | Age: 80
End: 2025-04-01
Payer: MEDICARE

## 2025-04-01 VITALS
RESPIRATION RATE: 16 BRPM | BODY MASS INDEX: 28.88 KG/M2 | HEIGHT: 63 IN | SYSTOLIC BLOOD PRESSURE: 126 MMHG | WEIGHT: 163 LBS | DIASTOLIC BLOOD PRESSURE: 60 MMHG | HEART RATE: 94 BPM | OXYGEN SATURATION: 96 %

## 2025-04-01 DIAGNOSIS — Z86.69 PERSONAL HISTORY OF OTHER DISEASES OF THE NERVOUS SYSTEM AND SENSE ORGANS: ICD-10-CM

## 2025-04-01 DIAGNOSIS — C34.90 MALIGNANT NEOPLASM OF UNSPECIFIED PART OF UNSPECIFIED BRONCHUS OR LUNG: ICD-10-CM

## 2025-04-01 DIAGNOSIS — R06.02 SHORTNESS OF BREATH: ICD-10-CM

## 2025-04-01 DIAGNOSIS — Z90.2 ACQUIRED ABSENCE OF LUNG [PART OF]: ICD-10-CM

## 2025-04-01 DIAGNOSIS — R05.9 COUGH, UNSPECIFIED: ICD-10-CM

## 2025-04-01 DIAGNOSIS — R91.8 OTHER NONSPECIFIC ABNORMAL FINDING OF LUNG FIELD: ICD-10-CM

## 2025-04-01 DIAGNOSIS — E66.3 OVERWEIGHT: ICD-10-CM

## 2025-04-01 DIAGNOSIS — Z87.891 PERSONAL HISTORY OF NICOTINE DEPENDENCE: ICD-10-CM

## 2025-04-01 PROCEDURE — G2211 COMPLEX E/M VISIT ADD ON: CPT

## 2025-04-01 PROCEDURE — 99214 OFFICE O/P EST MOD 30 MIN: CPT

## 2025-04-03 ENCOUNTER — OFFICE (OUTPATIENT)
Dept: URBAN - METROPOLITAN AREA CLINIC 104 | Facility: CLINIC | Age: 80
Setting detail: OPHTHALMOLOGY
End: 2025-04-03
Payer: MEDICARE

## 2025-04-03 DIAGNOSIS — H25.11: ICD-10-CM

## 2025-04-03 DIAGNOSIS — H25.12: ICD-10-CM

## 2025-04-03 DIAGNOSIS — H25.13: ICD-10-CM

## 2025-04-03 PROCEDURE — 99213 OFFICE O/P EST LOW 20 MIN: CPT | Performed by: SPECIALIST

## 2025-04-03 ASSESSMENT — REFRACTION_CURRENTRX
OS_CYLINDER: -0.50
OD_AXIS: 5
OS_SPHERE: -2.25
OD_OVR_VA: 20/
OD_ADD: +2.50
OS_AXIS: 5
OD_SPHERE: -3.25
OD_CYLINDER: -0.50
OS_OVR_VA: 20/
OS_ADD: +2.50

## 2025-04-03 ASSESSMENT — LID POSITION - PTOSIS: OD_PTOSIS: 2+

## 2025-04-03 ASSESSMENT — CONFRONTATIONAL VISUAL FIELD TEST (CVF)
OS_FINDINGS: FULL
OD_FINDINGS: FULL

## 2025-04-03 ASSESSMENT — KERATOMETRY
OD_K2POWER_DIOPTERS: 42.40
OD_K1POWER_DIOPTERS: 41.36
OS_K2POWER_DIOPTERS: 42.24
OS_K1POWER_DIOPTERS: 41.93
OS_AXISANGLE_DEGREES: 81
OD_AXISANGLE_DEGREES: 85

## 2025-04-03 ASSESSMENT — REFRACTION_MANIFEST
OD_SPHERE: -3.00
OS_SPHERE: -2.50
OS_CYLINDER: -0.50
OD_VA1: 20/40
OD_AXIS: 165
OS_AXIS: 75
OD_CYLINDER: -0.50
OS_VA1: 20/50

## 2025-04-03 ASSESSMENT — LID EXAM ASSESSMENTS
OS_LEVATOR_FUNCTION: 15 MM
OS_COMMENTS: CRUSTY, SCALY LIDS UL COMMENTS
OD_COMMENTS: CRUSTY, SCALY LIDS UL COMMENTS
OD_BLEPHARITIS: RLL RUL 1+
OD_MRD1: 1 MM
OS_BLEPHARITIS: LLL LUL 1+
OD_LEVATOR_FUNCTION: 15 MM

## 2025-04-03 ASSESSMENT — SUPERFICIAL PUNCTATE KERATITIS (SPK)
OD_SPK: 1+
OS_SPK: 1+

## 2025-04-03 ASSESSMENT — REFRACTION_AUTOREFRACTION
OS_CYLINDER: -0.50
OD_SPHERE: -3.00
OS_SPHERE: -2.50
OD_AXIS: 166
OS_AXIS: 74
OD_CYLINDER: -0.50

## 2025-04-03 ASSESSMENT — VISUAL ACUITY
OS_BCVA: 20/40
OD_BCVA: 20/50

## 2025-04-17 ENCOUNTER — RX ONLY (RX ONLY)
Age: 80
End: 2025-04-17

## 2025-04-17 ASSESSMENT — LID EXAM ASSESSMENTS
OS_LEVATOR_FUNCTION: 15 MM
OD_BLEPHARITIS: RLL RUL 1+
OS_COMMENTS: CRUSTY, SCALY LIDS UL COMMENTS
OD_MRD1: 1 MM
OD_LEVATOR_FUNCTION: 15 MM
OD_COMMENTS: CRUSTY, SCALY LIDS UL COMMENTS
OS_BLEPHARITIS: LLL LUL 1+

## 2025-04-17 ASSESSMENT — SUPERFICIAL PUNCTATE KERATITIS (SPK)
OS_SPK: 1+
OD_SPK: 1+

## 2025-04-17 ASSESSMENT — LID POSITION - PTOSIS: OD_PTOSIS: 2+

## 2025-04-18 ENCOUNTER — ASC (OUTPATIENT)
Dept: URBAN - METROPOLITAN AREA SURGERY 8 | Facility: SURGERY | Age: 80
Setting detail: OPHTHALMOLOGY
End: 2025-04-18
Payer: MEDICARE

## 2025-04-18 DIAGNOSIS — H52.221: ICD-10-CM

## 2025-04-18 DIAGNOSIS — H25.11: ICD-10-CM

## 2025-04-18 PROCEDURE — S9986 NOT MEDICALLY NECESSARY SVC: HCPCS | Mod: GX,GY | Performed by: SPECIALIST

## 2025-04-18 PROCEDURE — 66984 XCAPSL CTRC RMVL W/O ECP: CPT | Mod: RT | Performed by: SPECIALIST

## 2025-04-18 PROCEDURE — FEMTO PRECISION LASER CATARACT SURGERY: Mod: GY | Performed by: SPECIALIST

## 2025-04-19 ENCOUNTER — OFFICE (OUTPATIENT)
Dept: URBAN - METROPOLITAN AREA CLINIC 104 | Facility: CLINIC | Age: 80
Setting detail: OPHTHALMOLOGY
End: 2025-04-19
Payer: MEDICARE

## 2025-04-19 DIAGNOSIS — Z96.1: ICD-10-CM

## 2025-04-19 PROCEDURE — 99024 POSTOP FOLLOW-UP VISIT: CPT | Performed by: SPECIALIST

## 2025-04-19 ASSESSMENT — KERATOMETRY
OD_AXISANGLE_DEGREES: 075
OD_K2POWER_DIOPTERS: 42.67
OD_K1POWER_DIOPTERS: 40.61

## 2025-04-19 ASSESSMENT — CONFRONTATIONAL VISUAL FIELD TEST (CVF)
OD_FINDINGS: FULL
OS_FINDINGS: FULL

## 2025-04-19 ASSESSMENT — REFRACTION_CURRENTRX
OS_OVR_VA: 20/
OD_OVR_VA: 20/

## 2025-04-19 ASSESSMENT — PACHYMETRY
OD_CT_CORRECTION: 3
OS_CT_UM: 506
OD_CT_UM: 508
OS_CT_CORRECTION: 3

## 2025-04-19 ASSESSMENT — VISUAL ACUITY
OS_BCVA: 20/40-2
OD_BCVA: 20/50

## 2025-04-19 ASSESSMENT — TONOMETRY: OD_IOP_MMHG: 18

## 2025-04-19 ASSESSMENT — REFRACTION_AUTOREFRACTION
OD_CYLINDER: -0.75
OD_AXIS: 132
OD_SPHERE: -1.50

## 2025-04-22 ENCOUNTER — OFFICE (OUTPATIENT)
Dept: URBAN - METROPOLITAN AREA CLINIC 104 | Facility: CLINIC | Age: 80
Setting detail: OPHTHALMOLOGY
End: 2025-04-22
Payer: MEDICARE

## 2025-04-22 DIAGNOSIS — H25.12: ICD-10-CM

## 2025-04-22 DIAGNOSIS — H40.1131: ICD-10-CM

## 2025-04-22 DIAGNOSIS — Z96.1: ICD-10-CM

## 2025-04-22 PROCEDURE — 92136 OPHTHALMIC BIOMETRY: CPT | Mod: 26,LT | Performed by: SPECIALIST

## 2025-04-22 PROCEDURE — 99024 POSTOP FOLLOW-UP VISIT: CPT | Performed by: OPTOMETRIST

## 2025-04-22 ASSESSMENT — REFRACTION_AUTOREFRACTION
OD_SPHERE: -1.75
OS_SPHERE: -3.00
OS_SPHERE: -3.00
OD_AXIS: 033
OS_CYLINDER: SPHERE
OD_AXIS: 033
OD_SPHERE: -1.75
OD_CYLINDER: -0.25
OS_CYLINDER: SPHERE
OD_CYLINDER: -0.25

## 2025-04-22 ASSESSMENT — KERATOMETRY
OD_K1POWER_DIOPTERS: 41.46
OS_AXISANGLE_DEGREES: 081
OD_K2POWER_DIOPTERS: 41.93
OS_K2POWER_DIOPTERS: 42.19
OD_AXISANGLE_DEGREES: 090
OS_K2POWER_DIOPTERS: 42.19
OD_K2POWER_DIOPTERS: 41.93
OS_K1POWER_DIOPTERS: 41.93
OS_K1POWER_DIOPTERS: 41.93
OD_K1POWER_DIOPTERS: 41.46
OS_AXISANGLE_DEGREES: 081
OD_AXISANGLE_DEGREES: 090

## 2025-04-22 ASSESSMENT — LID EXAM ASSESSMENTS
OS_LEVATOR_FUNCTION: 15 MM
OS_BLEPHARITIS: LLL LUL 1+
OS_COMMENTS: CRUSTY, SCALY LIDS UL COMMENTS
OD_COMMENTS: CRUSTY, SCALY LIDS UL COMMENTS
OD_MRD1: 1 MM
OS_LEVATOR_FUNCTION: 15 MM
OD_LEVATOR_FUNCTION: 15 MM
OS_BLEPHARITIS: LLL LUL 1+
OD_BLEPHARITIS: RLL RUL 1+
OD_MRD1: 1 MM
OS_COMMENTS: CRUSTY, SCALY LIDS UL COMMENTS
OD_BLEPHARITIS: RLL RUL 1+
OD_LEVATOR_FUNCTION: 15 MM
OD_COMMENTS: CRUSTY, SCALY LIDS UL COMMENTS

## 2025-04-22 ASSESSMENT — VISUAL ACUITY
OD_BCVA: 20/60
OS_BCVA: 20/70
OS_BCVA: 20/70
OD_BCVA: 20/60

## 2025-04-22 ASSESSMENT — CONFRONTATIONAL VISUAL FIELD TEST (CVF)
OS_FINDINGS: FULL
OD_FINDINGS: FULL

## 2025-04-22 ASSESSMENT — SUPERFICIAL PUNCTATE KERATITIS (SPK)
OD_SPK: 1+
OS_SPK: 1+
OD_SPK: 1+
OS_SPK: 1+

## 2025-04-22 ASSESSMENT — LID POSITION - PTOSIS
OD_PTOSIS: 2+
OD_PTOSIS: 2+

## 2025-04-25 ENCOUNTER — RX ONLY (RX ONLY)
Age: 80
End: 2025-04-25

## 2025-04-25 ENCOUNTER — ASC (OUTPATIENT)
Dept: URBAN - METROPOLITAN AREA SURGERY 8 | Facility: SURGERY | Age: 80
Setting detail: OPHTHALMOLOGY
End: 2025-04-25
Payer: MEDICARE

## 2025-04-25 DIAGNOSIS — H25.12: ICD-10-CM

## 2025-04-25 DIAGNOSIS — H52.222: ICD-10-CM

## 2025-04-25 PROCEDURE — 66984 XCAPSL CTRC RMVL W/O ECP: CPT | Mod: 79,LT | Performed by: SPECIALIST

## 2025-04-25 PROCEDURE — S9986 NOT MEDICALLY NECESSARY SVC: HCPCS | Mod: GX,GY | Performed by: SPECIALIST

## 2025-04-25 PROCEDURE — FEMTO PRECISION LASER CATARACT SURGERY: Mod: GY | Performed by: SPECIALIST

## 2025-04-26 ENCOUNTER — OFFICE (OUTPATIENT)
Dept: URBAN - METROPOLITAN AREA CLINIC 104 | Facility: CLINIC | Age: 80
Setting detail: OPHTHALMOLOGY
End: 2025-04-26
Payer: MEDICARE

## 2025-04-26 DIAGNOSIS — Z96.1: ICD-10-CM

## 2025-04-26 PROBLEM — H25.12 CATARACT SENILE NUCLEAR SCLEROSIS; LEFT EYE,  ;
WITH CORTICAL: Status: RESOLVED | Noted: 2025-04-03 | Resolved: 2025-04-26

## 2025-04-26 PROBLEM — H25.042 CATARACT -POST SUBCAP; LEFT EYE: Status: RESOLVED | Noted: 2025-04-19 | Resolved: 2025-04-26

## 2025-04-26 PROCEDURE — 99024 POSTOP FOLLOW-UP VISIT: CPT | Performed by: OPTOMETRIST

## 2025-04-26 ASSESSMENT — KERATOMETRY
OS_K1POWER_DIOPTERS: 41.93
OD_AXISANGLE_DEGREES: 090
OD_K2POWER_DIOPTERS: 41.93
OS_K2POWER_DIOPTERS: 42.19
OD_K1POWER_DIOPTERS: 41.46
OS_AXISANGLE_DEGREES: 081

## 2025-04-26 ASSESSMENT — LID EXAM ASSESSMENTS
OS_BLEPHARITIS: LLL LUL 1+
OD_MRD1: 1 MM
OD_LEVATOR_FUNCTION: 15 MM
OS_LEVATOR_FUNCTION: 15 MM
OD_BLEPHARITIS: RLL RUL 1+
OD_COMMENTS: CRUSTY, SCALY LIDS UL COMMENTS
OS_COMMENTS: CRUSTY, SCALY LIDS UL COMMENTS

## 2025-04-26 ASSESSMENT — REFRACTION_MANIFEST
OS_CYLINDER: SPH
OD_VA1: 20/25
OD_SPHERE: -1.75
OS_SPHERE: -1.75
OD_CYLINDER: SPH
OS_VA1: 20/25

## 2025-04-26 ASSESSMENT — REFRACTION_AUTOREFRACTION
OS_SPHERE: 0.00
OD_SPHERE: -1.75
OS_CYLINDER: -2.00
OS_AXIS: 143
OD_CYLINDER: 0.00

## 2025-04-26 ASSESSMENT — VISUAL ACUITY
OD_BCVA: 20/40
OS_BCVA: 20/70

## 2025-04-26 ASSESSMENT — SUPERFICIAL PUNCTATE KERATITIS (SPK)
OD_SPK: 1+
OS_SPK: 1+

## 2025-04-26 ASSESSMENT — CONFRONTATIONAL VISUAL FIELD TEST (CVF)
OD_FINDINGS: FULL
OS_FINDINGS: FULL

## 2025-04-26 ASSESSMENT — LID POSITION - PTOSIS: OD_PTOSIS: 2+

## 2025-05-02 ENCOUNTER — OFFICE (OUTPATIENT)
Dept: URBAN - METROPOLITAN AREA CLINIC 104 | Facility: CLINIC | Age: 80
Setting detail: OPHTHALMOLOGY
End: 2025-05-02
Payer: MEDICARE

## 2025-05-02 DIAGNOSIS — Z96.1: ICD-10-CM

## 2025-05-02 PROCEDURE — 99024 POSTOP FOLLOW-UP VISIT: CPT | Performed by: OPTOMETRIST

## 2025-05-02 ASSESSMENT — LID EXAM ASSESSMENTS
OS_LEVATOR_FUNCTION: 15 MM
OD_LEVATOR_FUNCTION: 15 MM
OD_COMMENTS: CRUSTY, SCALY LIDS UL COMMENTS
OD_BLEPHARITIS: RLL RUL 1+
OS_BLEPHARITIS: LLL LUL 1+
OS_COMMENTS: CRUSTY, SCALY LIDS UL COMMENTS
OD_MRD1: 1 MM

## 2025-05-02 ASSESSMENT — REFRACTION_AUTOREFRACTION
OS_SPHERE: 0.00
OS_CYLINDER: -1.50
OD_CYLINDER: -0.50
OS_AXIS: 172
OD_SPHERE: -2.00
OD_AXIS: 164

## 2025-05-02 ASSESSMENT — KERATOMETRY
OD_K2POWER_DIOPTERS: 42.56
OD_AXISANGLE_DEGREES: 86
OS_AXISANGLE_DEGREES: 17
OS_K2POWER_DIOPTERS: 42.13
OD_K1POWER_DIOPTERS: 41.26
OS_K1POWER_DIOPTERS: 42.03

## 2025-05-02 ASSESSMENT — REFRACTION_MANIFEST
OS_CYLINDER: SPH
OS_SPHERE: -1.75
OD_CYLINDER: SPH
OD_VA1: 20/25
OS_VA1: 20/25
OD_SPHERE: -1.75

## 2025-05-02 ASSESSMENT — LID POSITION - PTOSIS: OD_PTOSIS: 2+

## 2025-05-02 ASSESSMENT — SUPERFICIAL PUNCTATE KERATITIS (SPK)
OD_SPK: 1+
OS_SPK: 1+

## 2025-05-02 ASSESSMENT — VISUAL ACUITY
OS_BCVA: 20/80
OD_BCVA: 20/40

## 2025-05-02 ASSESSMENT — CONFRONTATIONAL VISUAL FIELD TEST (CVF)
OD_FINDINGS: FULL
OS_FINDINGS: FULL

## 2025-05-15 ENCOUNTER — APPOINTMENT (OUTPATIENT)
Dept: ORTHOPEDIC SURGERY | Facility: CLINIC | Age: 80
End: 2025-05-15

## 2025-05-20 ENCOUNTER — NON-APPOINTMENT (OUTPATIENT)
Age: 80
End: 2025-05-20

## 2025-05-20 ENCOUNTER — APPOINTMENT (OUTPATIENT)
Dept: RADIATION ONCOLOGY | Facility: CLINIC | Age: 80
End: 2025-05-20
Payer: MEDICARE

## 2025-05-20 ENCOUNTER — OFFICE (OUTPATIENT)
Dept: URBAN - METROPOLITAN AREA CLINIC 104 | Facility: CLINIC | Age: 80
Setting detail: OPHTHALMOLOGY
End: 2025-05-20
Payer: MEDICARE

## 2025-05-20 VITALS
RESPIRATION RATE: 16 BRPM | BODY MASS INDEX: 29.41 KG/M2 | HEART RATE: 94 BPM | WEIGHT: 166 LBS | DIASTOLIC BLOOD PRESSURE: 75 MMHG | HEIGHT: 63 IN | OXYGEN SATURATION: 96 % | SYSTOLIC BLOOD PRESSURE: 151 MMHG

## 2025-05-20 DIAGNOSIS — H40.1131: ICD-10-CM

## 2025-05-20 DIAGNOSIS — Z79.811 LONG TERM (CURRENT) USE OF AROMATASE INHIBITORS: ICD-10-CM

## 2025-05-20 DIAGNOSIS — C50.911 MALIGNANT NEOPLASM OF UNSPECIFIED SITE OF RIGHT FEMALE BREAST: ICD-10-CM

## 2025-05-20 DIAGNOSIS — Z92.3 PERSONAL HISTORY OF IRRADIATION: ICD-10-CM

## 2025-05-20 DIAGNOSIS — Z98.890 OTHER SPECIFIED POSTPROCEDURAL STATES: ICD-10-CM

## 2025-05-20 DIAGNOSIS — Z96.1: ICD-10-CM

## 2025-05-20 PROCEDURE — 99024 POSTOP FOLLOW-UP VISIT: CPT | Performed by: OPTOMETRIST

## 2025-05-20 PROCEDURE — 99212 OFFICE O/P EST SF 10 MIN: CPT

## 2025-05-20 ASSESSMENT — KERATOMETRY
OS_K1POWER_DIOPTERS: 41.87
OD_K2POWER_DIOPTERS: 42.40
OS_AXISANGLE_DEGREES: 110
OD_AXISANGLE_DEGREES: 089
OD_K1POWER_DIOPTERS: 41.31
OS_K2POWER_DIOPTERS: 42.78

## 2025-05-20 ASSESSMENT — REFRACTION_MANIFEST
OS_CYLINDER: SPH
OD_CYLINDER: SPH
OS_SPHERE: -2.00
OD_VA1: 20/20
OD_SPHERE: -2.00
OS_VA1: 20/20

## 2025-05-20 ASSESSMENT — REFRACTION_AUTOREFRACTION
OS_AXIS: 016
OS_CYLINDER: -0.25
OD_CYLINDER: -0.25
OD_SPHERE: -1.75
OD_AXIS: 019
OS_SPHERE: -1.75

## 2025-05-20 ASSESSMENT — SUPERFICIAL PUNCTATE KERATITIS (SPK)
OS_SPK: 1+
OD_SPK: 1+

## 2025-05-20 ASSESSMENT — LID EXAM ASSESSMENTS
OS_BLEPHARITIS: LLL LUL 1+
OD_COMMENTS: CRUSTY, SCALY LIDS UL COMMENTS
OD_LEVATOR_FUNCTION: 15 MM
OS_LEVATOR_FUNCTION: 15 MM
OS_COMMENTS: CRUSTY, SCALY LIDS UL COMMENTS
OD_BLEPHARITIS: RLL RUL 1+
OD_MRD1: 1 MM

## 2025-05-20 ASSESSMENT — LID POSITION - PTOSIS: OD_PTOSIS: 2+

## 2025-05-20 ASSESSMENT — VISUAL ACUITY
OD_BCVA: 20/70
OS_BCVA: 20/70

## 2025-05-20 ASSESSMENT — CONFRONTATIONAL VISUAL FIELD TEST (CVF)
OS_FINDINGS: FULL
OD_FINDINGS: FULL

## 2025-06-01 NOTE — INPATIENT CERTIFICATION FOR MEDICARE PATIENTS - THE SEVERITY OF SIGNS/SYMPTOMS. (SEE ED/ADMIT DOCUMENTS)
Problem: Chronic Conditions and Co-morbidities  Goal: Patient's chronic conditions and co-morbidity symptoms are monitored and maintained or improved  6/1/2025 1059 by Kristina Manecra RN  Outcome: Progressing  6/1/2025 0109 by Salome Elliott LPN  Outcome: Progressing     Problem: Safety - Adult  Goal: Free from fall injury  6/1/2025 1059 by Kristina Mancera RN  Outcome: Progressing  6/1/2025 0109 by Salome Elliott LPN  Outcome: Progressing     Problem: Pain  Goal: Verbalizes/displays adequate comfort level or baseline comfort level  Outcome: Progressing      1. The severity of signs/symptoms.(See ED/admit documents)

## 2025-06-17 ENCOUNTER — APPOINTMENT (OUTPATIENT)
Dept: ORTHOPEDIC SURGERY | Facility: CLINIC | Age: 80
End: 2025-06-17

## 2025-06-23 ENCOUNTER — APPOINTMENT (OUTPATIENT)
Dept: BREAST CENTER | Facility: CLINIC | Age: 80
End: 2025-06-23
Payer: MEDICARE

## 2025-06-23 VITALS
HEIGHT: 63 IN | SYSTOLIC BLOOD PRESSURE: 147 MMHG | WEIGHT: 165 LBS | BODY MASS INDEX: 29.23 KG/M2 | HEART RATE: 74 BPM | TEMPERATURE: 97.8 F | DIASTOLIC BLOOD PRESSURE: 75 MMHG | OXYGEN SATURATION: 95 %

## 2025-06-23 PROCEDURE — 99214 OFFICE O/P EST MOD 30 MIN: CPT

## 2025-08-12 ENCOUNTER — OFFICE (OUTPATIENT)
Dept: URBAN - METROPOLITAN AREA CLINIC 104 | Facility: CLINIC | Age: 80
Setting detail: OPHTHALMOLOGY
End: 2025-08-12
Payer: MEDICARE

## 2025-08-12 DIAGNOSIS — H43.813: ICD-10-CM

## 2025-08-12 DIAGNOSIS — H01.002: ICD-10-CM

## 2025-08-12 DIAGNOSIS — Z96.1: ICD-10-CM

## 2025-08-12 DIAGNOSIS — H01.001: ICD-10-CM

## 2025-08-12 DIAGNOSIS — H01.004: ICD-10-CM

## 2025-08-12 DIAGNOSIS — H02.401: ICD-10-CM

## 2025-08-12 DIAGNOSIS — Z79.899: ICD-10-CM

## 2025-08-12 DIAGNOSIS — H16.223: ICD-10-CM

## 2025-08-12 DIAGNOSIS — H35.033: ICD-10-CM

## 2025-08-12 DIAGNOSIS — M06.9: ICD-10-CM

## 2025-08-12 DIAGNOSIS — H01.005: ICD-10-CM

## 2025-08-12 DIAGNOSIS — H40.1131: ICD-10-CM

## 2025-08-12 PROCEDURE — 92133 CPTRZD OPH DX IMG PST SGM ON: CPT | Performed by: OPTOMETRIST

## 2025-08-12 PROCEDURE — 92014 COMPRE OPH EXAM EST PT 1/>: CPT | Performed by: OPTOMETRIST

## 2025-08-12 ASSESSMENT — REFRACTION_AUTOREFRACTION
OS_CYLINDER: -0.25
OD_CYLINDER: -0.25
OD_AXIS: 101
OS_SPHERE: -1.50
OD_SPHERE: -1.75
OS_AXIS: 101

## 2025-08-12 ASSESSMENT — KERATOMETRY
OD_K2POWER_DIOPTERS: 42.13
OS_K1POWER_DIOPTERS: 41.82
OS_K2POWER_DIOPTERS: 42.08
OD_K1POWER_DIOPTERS: 41.31
OD_AXISANGLE_DEGREES: 90
OS_AXISANGLE_DEGREES: 93

## 2025-08-12 ASSESSMENT — CONFRONTATIONAL VISUAL FIELD TEST (CVF)
OS_FINDINGS: FULL
OD_FINDINGS: FULL

## 2025-08-12 ASSESSMENT — LID EXAM ASSESSMENTS
OD_LEVATOR_FUNCTION: 15 MM
OS_COMMENTS: CRUSTY, SCALY LIDS UL COMMENTS
OD_COMMENTS: CRUSTY, SCALY LIDS UL COMMENTS
OD_BLEPHARITIS: RLL RUL 1+
OS_BLEPHARITIS: LLL LUL 1+
OD_MRD1: 1 MM
OS_LEVATOR_FUNCTION: 15 MM

## 2025-08-12 ASSESSMENT — VISUAL ACUITY
OD_BCVA: 20/20-1
OS_BCVA: 20/20

## 2025-08-12 ASSESSMENT — REFRACTION_CURRENTRX
OD_SPHERE: -2.00
OD_OVR_VA: 20/
OS_SPHERE: -2.00
OS_OVR_VA: 20/

## 2025-08-12 ASSESSMENT — SUPERFICIAL PUNCTATE KERATITIS (SPK)
OD_SPK: 1+
OS_SPK: 1+

## 2025-08-12 ASSESSMENT — LID POSITION - PTOSIS: OD_PTOSIS: 2+

## 2025-09-16 ENCOUNTER — RESULT REVIEW (OUTPATIENT)
Age: 80
End: 2025-09-16

## 2025-09-16 ENCOUNTER — APPOINTMENT (OUTPATIENT)
Dept: HEMATOLOGY ONCOLOGY | Facility: CLINIC | Age: 80
End: 2025-09-16
Payer: MEDICARE

## 2025-09-16 ENCOUNTER — APPOINTMENT (OUTPATIENT)
Dept: HEMATOLOGY ONCOLOGY | Facility: CLINIC | Age: 80
End: 2025-09-16

## 2025-09-16 VITALS
DIASTOLIC BLOOD PRESSURE: 79 MMHG | SYSTOLIC BLOOD PRESSURE: 154 MMHG | HEART RATE: 63 BPM | BODY MASS INDEX: 28.88 KG/M2 | TEMPERATURE: 98.1 F | WEIGHT: 163 LBS | HEIGHT: 63 IN | OXYGEN SATURATION: 98 %

## 2025-09-16 DIAGNOSIS — Z79.811 LONG TERM (CURRENT) USE OF AROMATASE INHIBITORS: ICD-10-CM

## 2025-09-16 DIAGNOSIS — C50.911 MALIGNANT NEOPLASM OF UNSPECIFIED SITE OF RIGHT FEMALE BREAST: ICD-10-CM

## 2025-09-16 PROCEDURE — 99214 OFFICE O/P EST MOD 30 MIN: CPT

## 2025-09-18 ENCOUNTER — NON-APPOINTMENT (OUTPATIENT)
Age: 80
End: 2025-09-18

## 2025-09-18 LAB
25(OH)D3 SERPL-MCNC: 40.6 NG/ML
ALBUMIN SERPL ELPH-MCNC: 4.5 G/DL
ALP BLD-CCNC: 122 U/L
ALT SERPL-CCNC: 22 U/L
ANION GAP SERPL CALC-SCNC: 11 MMOL/L
AST SERPL-CCNC: 27 U/L
BILIRUB SERPL-MCNC: 0.4 MG/DL
BUN SERPL-MCNC: 18 MG/DL
CALCIUM SERPL-MCNC: 11.1 MG/DL
CHLORIDE SERPL-SCNC: 104 MMOL/L
CO2 SERPL-SCNC: 27 MMOL/L
CREAT SERPL-MCNC: 0.53 MG/DL
EGFRCR SERPLBLD CKD-EPI 2021: 95 ML/MIN/1.73M2
GLUCOSE SERPL-MCNC: 93 MG/DL
POTASSIUM SERPL-SCNC: 4.9 MMOL/L
PROT SERPL-MCNC: 6.6 G/DL
SODIUM SERPL-SCNC: 143 MMOL/L

## (undated) DEVICE — Device

## (undated) DEVICE — D HELP - CLEARVIEW CLEARIFY SYSTEM

## (undated) DEVICE — DRSG TEGADERM 6"X8"

## (undated) DEVICE — BLADE SCALPEL SAFETYLOCK #10

## (undated) DEVICE — SCOPE WARMER SEAL DISP

## (undated) DEVICE — TROCAR COVIDIEN STEP 5MM SHORT 70MM

## (undated) DEVICE — STAPLER SKIN VISI-STAT 35 WIDE

## (undated) DEVICE — POSITIONER FOAM EGG CRATE ULNAR 2PCS (PINK)

## (undated) DEVICE — DRSG OPSITE 13.75 X 4"

## (undated) DEVICE — GLV 7 PROTEXIS (WHITE)

## (undated) DEVICE — PREP CHLORAPREP HI-LITE ORANGE 26ML

## (undated) DEVICE — DRAPE INSTRUMENT POUCH 6.75" X 11"

## (undated) DEVICE — FOLEY TRAY 16FR 5CC LTX UMETER CLOSED

## (undated) DEVICE — DRSG TELFA 3 X 8

## (undated) DEVICE — DRSG STERISTRIPS 0.5 X 4"

## (undated) DEVICE — DRAPE GENERAL ENDOSCOPY

## (undated) DEVICE — DRAPE 1/2 SHEET 40X57"

## (undated) DEVICE — TUBING STRYKEFLOW II SUCTION / IRRIGATOR

## (undated) DEVICE — BIOSEL SIGMOIDOSCOPE KIT DISP

## (undated) DEVICE — GLV 8.5 PROTEXIS (WHITE)

## (undated) DEVICE — BLADE SCALPEL SAFETYLOCK #15

## (undated) DEVICE — WARMING BLANKET UPPER ADULT

## (undated) DEVICE — GLV 8 PROTEXIS (WHITE)

## (undated) DEVICE — LIGASURE MARYLAND 37CM

## (undated) DEVICE — TROCAR COVIDIEN VERSASTEP PLUS 15MM STANDARD

## (undated) DEVICE — MARKING PEN W RULER

## (undated) DEVICE — SOL IRR POUR H2O 250ML

## (undated) DEVICE — TUBING INSUFFLATION LAP FILTER 10FT

## (undated) DEVICE — DRAPE MAYO STAND 30"

## (undated) DEVICE — TUBING SUCTION 20FT

## (undated) DEVICE — GOWN TRIMAX LG

## (undated) DEVICE — SOL IRR POUR NS 0.9% 500ML

## (undated) DEVICE — WARMING BLANKET LOWER ADULT

## (undated) DEVICE — GLV 7.5 PROTEXIS (WHITE)

## (undated) DEVICE — MEDICATION LABELS W MARKER

## (undated) DEVICE — DRAPE TOWEL BLUE 17" X 24"

## (undated) DEVICE — VENODYNE/SCD SLEEVE CALF LARGE

## (undated) DEVICE — STAPLER COVIDIEN ENDO GIA STANDARD HANDLE

## (undated) DEVICE — GLV 6.5 PROTEXIS (WHITE)

## (undated) DEVICE — PACK LAP CHOLE LAP APPENDECTOMY